# Patient Record
Sex: MALE | Race: ASIAN | NOT HISPANIC OR LATINO | URBAN - METROPOLITAN AREA
[De-identification: names, ages, dates, MRNs, and addresses within clinical notes are randomized per-mention and may not be internally consistent; named-entity substitution may affect disease eponyms.]

---

## 2021-01-31 ENCOUNTER — EMERGENCY (EMERGENCY)
Facility: HOSPITAL | Age: 21
LOS: 1 days | Discharge: HOME | End: 2021-01-31
Attending: EMERGENCY MEDICINE | Admitting: EMERGENCY MEDICINE
Payer: MEDICAID

## 2021-01-31 VITALS
DIASTOLIC BLOOD PRESSURE: 88 MMHG | TEMPERATURE: 98 F | HEART RATE: 114 BPM | OXYGEN SATURATION: 100 % | SYSTOLIC BLOOD PRESSURE: 138 MMHG | RESPIRATION RATE: 18 BRPM

## 2021-01-31 PROCEDURE — 99283 EMERGENCY DEPT VISIT LOW MDM: CPT

## 2021-01-31 PROCEDURE — 71046 X-RAY EXAM CHEST 2 VIEWS: CPT | Mod: 26

## 2021-01-31 RX ORDER — IBUPROFEN 200 MG
400 TABLET ORAL ONCE
Refills: 0 | Status: COMPLETED | OUTPATIENT
Start: 2021-01-31 | End: 2021-01-31

## 2021-01-31 RX ORDER — ACETAMINOPHEN 500 MG
650 TABLET ORAL ONCE
Refills: 0 | Status: COMPLETED | OUTPATIENT
Start: 2021-01-31 | End: 2021-01-31

## 2021-01-31 RX ADMIN — Medication 650 MILLIGRAM(S): at 13:59

## 2021-01-31 RX ADMIN — Medication 400 MILLIGRAM(S): at 13:59

## 2021-01-31 NOTE — ED PROVIDER NOTE - ATTENDING CONTRIBUTION TO CARE
I have seen and examined the patient on the patient´s visit date. I have reviewed the note written by Glen Hines MD, on that visit day. I have supervised and participated as necessary in the performance of procedures indicated for patient management and was available at all phases of the patient´s visit when needed. We discussed the history, physical exam findings, mnagement plan, and  medical decision making. I have made my additons, exceptions, and revisions within the chart and I agree with H and P as documented in its entirety. The data and my interpretation of any data collected from labs, interventions and imaging appear below as well as my independent medical decision making and considerations    The patient is a 20y Male who has a past medical and surgery history of  PTED with chest pain in the setting of heavy lifting as described  Vital Signs Last 24 Hrs  T(F): 98 HR: 114 BP: 138/88 RR: 18 SpO2: 100% (31 Jan 2021 12:45) (100% - 100%)  PE: as described; my additions and exceptions are noted in the chart  Plan  reassurance   analgesics  EKG normal with no risk factors active; PCP referral adequate  RTED PRN

## 2021-01-31 NOTE — ED PROVIDER NOTE - CLINICAL SUMMARY MEDICAL DECISION MAKING FREE TEXT BOX
Young Pt wo medical problems w/ cp. VSS. Exam unremarkable. Likely msk in the setting of new gym activity. Unlike acs, PE. EKG, xray, meds.

## 2021-01-31 NOTE — ED PROVIDER NOTE - OBJECTIVE STATEMENT
19yo M w/o medical problems comes to ED for cp. Chest pain x2days, L sided, worse with movement, sharp, constant. Not associated with SOB. Pt denies family hx of early cardiac issues. Also denies f/c, syncope, n/v, abdominal pain, or change in urine/bowel. Pt admits to working out again, 5 straight days last 2 days ago. Has not taken meds.

## 2021-01-31 NOTE — ED PROVIDER NOTE - NSFOLLOWUPINSTRUCTIONS_ED_ALL_ED_FT
[facility]  General Internal Medicine  [address] [place] [address]  Phone: [phone]  Fax:     [facility]  Internal Medicine  [address] [place] [address]  Phone: [phone]  Fax: [phone]    [facility]  Cardiology  [address] [place] [address]  Phone: [phone]    Chest Wall Pain  WHAT YOU NEED TO KNOW:  Chest wall pain may be caused by problems with the muscles, cartilage, or bones of the chest wall. Chest wall pain may also be caused by pain that spreads to your chest from another part of your body. The pain may be aching, severe, dull, or sharp. It may come and go, or it may be constant. The pain may be worse when you move in certain ways, breathe deeply, or cough.   DISCHARGE INSTRUCTIONS:  Call 911 if:   •You have any of the following signs of a heart attack: ?Squeezing, pressure, or pain in your chest  ?You may also have any of the following: ?Discomfort or pain in your back, neck, jaw, stomach, or arm  ?Shortness of breath  ?Nausea or vomiting  ?Lightheadedness or a sudden cold sweat  Return to the emergency department if:   •You have severe pain.  Contact your healthcare provider if:   •You develop a rash.   •You have other new symptoms.  •Your pain does not improve, even with treatment.  •You have questions or concerns about your condition or care.   Medicines: You may need any of the following:   •NSAIDs, such as ibuprofen, help decrease swelling, pain, and fever. This medicine is available with or without a doctor's order. NSAIDs can cause stomach bleeding or kidney problems in certain people. If you take blood thinner medicine, always ask your healthcare provider if NSAIDs are safe for you. Always read the medicine label and follow directions.  •Acetaminophen decreases pain. It is available without a doctor's order. Ask how much to take and how often to take it. Follow directions. Acetaminophen can cause liver damage if not taken correctly.  •A cream may be applied to your chest to decrease pain.   •Take your medicine as directed. Contact your healthcare provider if you think your medicine is not helping or if you have side effects. Tell him of her if you are allergic to any medicine. Keep a list of the medicines, vitamins, and herbs you take. Include the amounts, and when and why you take them. Bring the list or the pill bottles to follow-up visits. Carry your medicine list with you in case of an emergency.  Follow up with your healthcare provider as directed: Write down your questions so you remember to ask them during your visits.   Self-care:   •Rest as needed. Avoid activities that make your chest wall pain worse.  •Apply heat on your chest for 20 to 30 minutes every 2 hours for as many days as directed. Heat helps decrease pain and muscle spasms.  •Apply ice on your chest for 15 to 20 minutes every hour or as directed. Use an ice pack, or put crushed ice in a plastic bag. Cover it with a towel. Ice helps prevent tissue damage and decreases swelling and pain.  Dolor en la pared torácica  LO QUE NECESITA SABER:  El dolor de la pared torácica puede ser causado por problemas con los músculos, cartílago o huesos de la pared torácica. El dolor de la pared torácica también puede ser causado por dolor que se propaga a liu pecho y que proviene de otra parte de liu cuerpo. El dolor puede ser persistente, severo, leve o juana. Puede ser intermitente (va y viene) o puede ser persistente. El dolor también puede ser peor cuando usted se mueve de ciertas formas, respira profundo o tose  INSTRUCCIONES SOBRE EL BALJIT HOSPITALARIA:  Llame al 911 si:  •Tiene alguno de los siguientes signos de un ataque cardíaco: ?Estrujamiento, presión o tensión en liu pecho  ?Usted también podría presentar alguno de los siguientes: ?Malestar o dolor en liu espalda, merna, mandíbula, abdomen, o brazo  ?Falta de aliento  ?Náuseas o vómitos  ?Desvanecimiento o sudor frío repentino  Regrese a la bridgette de emergencias si:  •Usted tiene dolor intenso.  Comuníquese con liu médico si:  •Usted desarrolla un sarpullido.  •Usted tiene otros síntomas nuevos.  •Liu dolor no mejora, aún con tratamiento.  •Usted tiene preguntas o inquietudes acerca de liu condición o cuidado.  Medicamentos:Es posible que usted necesite alguno de los siguientes:   •Los SHELLY,robin el ibuprofeno, ayudan a disminuir la inflamación, el dolor y la fiebre. Gina medicamento está disponible con o sin marci receta médica. Los SHELLY pueden causar sangrado estomacal o problemas renales en ciertas personas. Si usted eligio un medicamento anticoagulante, siempre pregúntele a liu médico si los SHELLY son seguros para usted. Siempre hortencia la etiqueta de gina medicamento y siga las instrucciones.  •Acetaminofénalivia el dolor. Está disponible sin receta médica. Pregunte la cantidad y la frecuencia con que debe tomarlos. Siga las indicaciones. El acetaminofén puede causar daño en el hígado cuando no se eligio de forma correcta.  •Marci cremase puede aplicar en liu pecho para aliviar el dolor.  •[place] angel medicamentos robin se le haya indicado.Consulte con liu médico si usted nicki que liu medicamento no le está ayudando o si presenta efectos secundarios. Infórmele si es alérgico a algún medicamento. Mantenga marci lista actualizada de los medicamentos, las vitaminas y los productos herbales que eligio. Incluya los siguientes datos de los medicamentos: cantidad, frecuencia y motivo de administración. Traiga con usted la lista o los envases de las píldoras a angel citas de seguimiento. Lleve la lista de los medicamentos con usted en daniel de marci emergencia.  Acuda a angel consultas de control con liu médico según le indicaron.Anote angel preguntas para que se acuerde de hacerlas shade angel visitas.  Cuidados personales:  •El descansotanto robin sea necesario. Evite las actividades que le empeoren el dolor de la pared torácica.  •La aplicación de calora liu pecho de 20 a 30 minutos cada 2 horas por los días que le indiquen. El calor ayuda a disminuir el dolor y los espasmos musculares.  •Aplique hieloa liu pecho de 15 a 20 minutos cada hora según indicaciones. Use marci compresa de hielo o ponga hielo triturado en marci bolsa de plástico. Cúbrala con marci toalla. El hielo ayuda a evitar daño al tejido y a disminuir la inflamación y el dolor.

## 2021-01-31 NOTE — ED PROVIDER NOTE - PATIENT PORTAL LINK FT
You can access the FollowMyHealth Patient Portal offered by [facility] by registering at the following website: [email or web address]. By joining [employer]’s FollowMyHealth portal, you will also be able to view your health information using other applications (apps) compatible with our system.

## 2021-01-31 NOTE — ED PROVIDER NOTE - NS ED ROS FT
Constitutional: no fevers, chills  HEENT: no cough, rhinorrhea  Cardiac: no palpitations +cp  Respiratory: no SOB  GI: no n/v, abd pain, bloody or dark stools  : no dysuria, frequency, or hematuria  MSK: no joint pain  Skin: no rashes  Neuro: no headache, change in vision, weakness  Psych: negative

## 2021-01-31 NOTE — ED PROVIDER NOTE - PHYSICAL EXAMINATION
General: non-toxic, NAD  HEENT: NCAT, PERRL  Cardiac: RRR, no murmurs, 2+ peripheral pulses  Chest: CTA +mild ttp L side +mild pain with shoulder ROM  Abdomen: soft, non-distended, bowel sounds present, no ttp, no rebound or guarding  Extremities: no peripheral edema, -leg size discrepancies -calf tenderness  Skin: no rashes  Neuro: AAOx4, 5+motor, sensory grossly intact  Psych: mood and affect appropriate

## 2021-03-21 ENCOUNTER — INPATIENT (INPATIENT)
Facility: HOSPITAL | Age: 21
LOS: 17 days | Discharge: HOME | End: 2021-04-08
Attending: PSYCHIATRY & NEUROLOGY | Admitting: PSYCHIATRY & NEUROLOGY
Payer: MEDICAID

## 2021-03-21 VITALS
RESPIRATION RATE: 16 BRPM | DIASTOLIC BLOOD PRESSURE: 88 MMHG | TEMPERATURE: 99 F | SYSTOLIC BLOOD PRESSURE: 153 MMHG | OXYGEN SATURATION: 100 % | HEART RATE: 98 BPM

## 2021-03-21 DIAGNOSIS — F28 OTHER PSYCHOTIC DISORDER NOT DUE TO A SUBSTANCE OR KNOWN PHYSIOLOGICAL CONDITION: ICD-10-CM

## 2021-03-21 DIAGNOSIS — Z98.890 OTHER SPECIFIED POSTPROCEDURAL STATES: Chronic | ICD-10-CM

## 2021-03-21 LAB
ALBUMIN SERPL ELPH-MCNC: 5.2 G/DL — HIGH (ref 3.3–5)
ALP SERPL-CCNC: 89 U/L — SIGNIFICANT CHANGE UP (ref 40–120)
ALT FLD-CCNC: 23 U/L — SIGNIFICANT CHANGE UP (ref 4–41)
ANION GAP SERPL CALC-SCNC: 13 MMOL/L — SIGNIFICANT CHANGE UP (ref 7–14)
AST SERPL-CCNC: 18 U/L — SIGNIFICANT CHANGE UP (ref 4–40)
BASOPHILS # BLD AUTO: 0.03 K/UL — SIGNIFICANT CHANGE UP (ref 0–0.2)
BASOPHILS NFR BLD AUTO: 0.4 % — SIGNIFICANT CHANGE UP (ref 0–2)
BILIRUB SERPL-MCNC: 1 MG/DL — SIGNIFICANT CHANGE UP (ref 0.2–1.2)
BUN SERPL-MCNC: 12 MG/DL — SIGNIFICANT CHANGE UP (ref 7–23)
CALCIUM SERPL-MCNC: 10.2 MG/DL — SIGNIFICANT CHANGE UP (ref 8.4–10.5)
CHLORIDE SERPL-SCNC: 97 MMOL/L — LOW (ref 98–107)
CO2 SERPL-SCNC: 28 MMOL/L — SIGNIFICANT CHANGE UP (ref 22–31)
CREAT SERPL-MCNC: 0.99 MG/DL — SIGNIFICANT CHANGE UP (ref 0.5–1.3)
EOSINOPHIL # BLD AUTO: 0.02 K/UL — SIGNIFICANT CHANGE UP (ref 0–0.5)
EOSINOPHIL NFR BLD AUTO: 0.3 % — SIGNIFICANT CHANGE UP (ref 0–6)
GLUCOSE SERPL-MCNC: 110 MG/DL — HIGH (ref 70–99)
HCT VFR BLD CALC: 49.4 % — SIGNIFICANT CHANGE UP (ref 39–50)
HGB BLD-MCNC: 16.2 G/DL — SIGNIFICANT CHANGE UP (ref 13–17)
IANC: 4.81 K/UL — SIGNIFICANT CHANGE UP (ref 1.5–8.5)
IMM GRANULOCYTES NFR BLD AUTO: 0.3 % — SIGNIFICANT CHANGE UP (ref 0–1.5)
LYMPHOCYTES # BLD AUTO: 1.89 K/UL — SIGNIFICANT CHANGE UP (ref 1–3.3)
LYMPHOCYTES # BLD AUTO: 26.2 % — SIGNIFICANT CHANGE UP (ref 13–44)
MAGNESIUM SERPL-MCNC: 2 MG/DL — SIGNIFICANT CHANGE UP (ref 1.6–2.6)
MCHC RBC-ENTMCNC: 26.8 PG — LOW (ref 27–34)
MCHC RBC-ENTMCNC: 32.8 GM/DL — SIGNIFICANT CHANGE UP (ref 32–36)
MCV RBC AUTO: 81.7 FL — SIGNIFICANT CHANGE UP (ref 80–100)
MONOCYTES # BLD AUTO: 0.45 K/UL — SIGNIFICANT CHANGE UP (ref 0–0.9)
MONOCYTES NFR BLD AUTO: 6.2 % — SIGNIFICANT CHANGE UP (ref 2–14)
NEUTROPHILS # BLD AUTO: 4.81 K/UL — SIGNIFICANT CHANGE UP (ref 1.8–7.4)
NEUTROPHILS NFR BLD AUTO: 66.6 % — SIGNIFICANT CHANGE UP (ref 43–77)
NRBC # BLD: 0 /100 WBCS — SIGNIFICANT CHANGE UP
NRBC # FLD: 0 K/UL — SIGNIFICANT CHANGE UP
PLATELET # BLD AUTO: 226 K/UL — SIGNIFICANT CHANGE UP (ref 150–400)
POTASSIUM SERPL-MCNC: 3.9 MMOL/L — SIGNIFICANT CHANGE UP (ref 3.5–5.3)
POTASSIUM SERPL-SCNC: 3.9 MMOL/L — SIGNIFICANT CHANGE UP (ref 3.5–5.3)
PROT SERPL-MCNC: 8.2 G/DL — SIGNIFICANT CHANGE UP (ref 6–8.3)
RBC # BLD: 6.05 M/UL — HIGH (ref 4.2–5.8)
RBC # FLD: 13.1 % — SIGNIFICANT CHANGE UP (ref 10.3–14.5)
SARS-COV-2 RNA SPEC QL NAA+PROBE: SIGNIFICANT CHANGE UP
SODIUM SERPL-SCNC: 138 MMOL/L — SIGNIFICANT CHANGE UP (ref 135–145)
TOXICOLOGY SCREEN, DRUGS OF ABUSE, SERUM RESULT: SIGNIFICANT CHANGE UP
TSH SERPL-MCNC: 0.89 UIU/ML — SIGNIFICANT CHANGE UP (ref 0.27–4.2)
WBC # BLD: 7.22 K/UL — SIGNIFICANT CHANGE UP (ref 3.8–10.5)
WBC # FLD AUTO: 7.22 K/UL — SIGNIFICANT CHANGE UP (ref 3.8–10.5)

## 2021-03-21 PROCEDURE — 70460 CT HEAD/BRAIN W/DYE: CPT | Mod: 26

## 2021-03-21 PROCEDURE — 99285 EMERGENCY DEPT VISIT HI MDM: CPT | Mod: GC

## 2021-03-21 PROCEDURE — 70487 CT MAXILLOFACIAL W/DYE: CPT | Mod: 26

## 2021-03-21 RX ORDER — DIPHENHYDRAMINE HCL 50 MG
50 CAPSULE ORAL ONCE
Refills: 0 | Status: COMPLETED | OUTPATIENT
Start: 2021-03-21 | End: 2021-03-21

## 2021-03-21 RX ORDER — SODIUM CHLORIDE 9 MG/ML
1000 INJECTION INTRAMUSCULAR; INTRAVENOUS; SUBCUTANEOUS ONCE
Refills: 0 | Status: COMPLETED | OUTPATIENT
Start: 2021-03-21 | End: 2021-03-21

## 2021-03-21 RX ADMIN — SODIUM CHLORIDE 1000 MILLILITER(S): 9 INJECTION INTRAMUSCULAR; INTRAVENOUS; SUBCUTANEOUS at 16:27

## 2021-03-21 NOTE — ED BEHAVIORAL HEALTH ASSESSMENT NOTE - CASE SUMMARY
Patient was seen , evaluated, elements of hpi/ros/mse were discussed and agree with the above assessment and plan. The patient is a 19 yo male, domiciled, no prior psychiatric history, no previous psychiatric hospitalizations, no known suicide attempts, no significant medical history, recent history of punching walls in the context likely psychiatric decompensation, who presented to the ED for a few days of worsening confusion. Medical workup was unremarkable and psychiatry was consulted.  Patient presents disorganised in behavior and thought process, not able to fully explain why he is here. One minute he states his mood is ok, then he reports feeling sad. He is noted to touch his pants in bizzare fashion . Patient later required IM medications as he was trying to get into the bed with another pt . Pt at this time requires psychiatric admission for disorganization.

## 2021-03-21 NOTE — ED BEHAVIORAL HEALTH ASSESSMENT NOTE - DETAILS
patient did not endorse any suicidality but exam was limited by his disorganization attempted to call patient's parents twice no bed available, will update with patient has a bed [address]

## 2021-03-21 NOTE — ED BEHAVIORAL HEALTH ASSESSMENT NOTE - RISK ASSESSMENT
High Acute Suicide Risk Risk factors include his psychosis, age, his gender, no current psychiatric treatment, recent aggression towards property via punching a wall and limited insight into his need for treatment. Protective factors include strong social supports, housing stability, no known history of SA, no known current S/H/I/I/P

## 2021-03-21 NOTE — ED ADULT TRIAGE NOTE - CHIEF COMPLAINT QUOTE
Sent from MD to r/o meningitis. Pt. brought to ER by father for confusion starting 2-3 days ago. Pt. had nasal surgery on 3/3/21. Pt. has been disoriented and hitting things. Father noticed pt. to be sweaty and c/o headache yesterday. Unable to answer certain questions in triage but able to follow commands.

## 2021-03-21 NOTE — ED PROVIDER NOTE - ATTENDING CONTRIBUTION TO CARE
Attending Attestation: Dr. Montiel  I have personally performed a history and physical examination of the patient and discussed management with the resident as well as the patient.  I reviewed the resident's note and agree with the documented findings and plan of care.  I have authored and modified critical sections of the Provider Note, including but not limited to HPI, Physical Exam and MDM. 19yo M with recent nasal septum surgery presenting now for AMS with HA, confusion. No focal neuro deficits however patient is distractible and slow to respond to commands.  No nuchal rigidity or stiffness.  Pt reports ambiguous history of hearing voices. Denies fever/chills, cough, sick contacts, change in vision, or loss of ambulation. Low likelihood for meningitis given lack of fever and benign physical exam. Would consider possibly encephalitis, however, given lack of any vital sign abnormalities, exposures, and seasonality would consider mental changes changes possibly related to substance vs primary pscyh, but would also assess for structural dz via CTH., also to r/o infectioius complications of surgery.

## 2021-03-21 NOTE — ED ADULT NURSE REASSESSMENT NOTE - NS ED NURSE REASSESS COMMENT FT1
Bh pt coming from main ER area 23A pt is medical clear, sent for pschy eval. for increase confusion, pt cooperative, following instructions.    pending displ  Breanna Duenas RN

## 2021-03-21 NOTE — ED PROVIDER NOTE - PSYCHIATRIC, MLM
Alert and oriented to person, place, time/situation. normal mood and affect. no apparent risk to self or others. Unable to answer certain questions.

## 2021-03-21 NOTE — ED BEHAVIORAL HEALTH ASSESSMENT NOTE - DESCRIPTION
none known ICU Vital Signs Last 24 Hrs  T(C): 37 (21 Mar 2021 16:27), Max: 37.1 (21 Mar 2021 13:57)  T(F): 98.6 (21 Mar 2021 16:27), Max: 98.7 (21 Mar 2021 13:57)  HR: 81 (21 Mar 2021 16:27) (81 - 98)  BP: 172/99 (21 Mar 2021 16:27) (147/85 - 172/99)  BP(mean): --  ABP: --  ABP(mean): --  RR: 18 (21 Mar 2021 16:27) (16 - 18)  SpO2: 100% (21 Mar 2021 16:27) (100% - 100%)    Pt relatively calm, no IM PRNs needed patient lives at home with his mother and father

## 2021-03-21 NOTE — ED ADULT NURSE NOTE - OBJECTIVE STATEMENT
RN Facilitator: Pt received to room 23. Pt comes to ED c/o "altered mental status" as per pt's father X2-3 days. Pt had a nasal septoplasty on 3/3 and was told by his PMD to come to ED to r/o meningitis. Father reports pt has had poor sleep over the past week as well as having "outbursts" where pt has punched the wall. Pt is A&Ox3 at this time but admits he has confusion but cannot explain it. Denies SI/HI/hallucinations. Endorses marijuana use. States he has mild headaches and mild photophobia. 20 G IV placed to left AC. Respirations are even & unlabored, lung sounds clear, heart sounds normal, abdomen is soft, non-distended. Pt denies chest pain, dyspnea, N/V/D, chills, fever, weakness, dizziness, palpitations, cough. Pt's father at bedside. Report given to primary RN Amanda Laura.

## 2021-03-21 NOTE — ED BEHAVIORAL HEALTH ASSESSMENT NOTE - HPI (INCLUDE ILLNESS QUALITY, SEVERITY, DURATION, TIMING, CONTEXT, MODIFYING FACTORS, ASSOCIATED SIGNS AND SYMPTOMS)
On interview that patient perseverated on his parents and going home and had trouble staying on subject. He states that he's unsure of how he got to the hospital. He answered questions very concretely for example, in response to the question "what do you normally do at home?" He responded, "sometimes I use the bathroom (pause) sometimes I eat (pause) with my parents" In response to the questions about his appetite the patient responded, "I do eat, my parents get me food. They're the ones who feed me, right?" The patient states that he did feel confused and then said louder that he wanted to go home, quickly got up and walked purposefully towards me. He was able to be verbally redirected by security. The patient would sometimes answer questions softly, then appearing internally preoccupied he would say, "no" and give the opposite answer much louder and with more intensity. For example, in response to the question, "do you feel safe?", he first responded very softly, "I actually don't feel safe" then he suddenly said "No! Of course I feel safe! With my parents! I want to go home." Since the patient continued to have difficulty maintaining space between he and myself and would often unpredictable move towards me, I ended the interview early. The patient was unable to give a meaningful answer to questions about sleep. He did not endorse S/H/I/I/P.    Per staff patient appeared dysphoric and tearful prior to the interview. The patient is a 19 yo male, domiciled, no prior psychiatric history, no previous psychiatric hospitalizations, no known suicide attempts, no significant medical history, recent history of punching walls in the context likely psychiatric decompensation, who presented to the ED for a few days of worsening confusion. Medical workup was unremarkable and psychiatry was consulted.     Per ED note on 3/21, "recent nasal septoplasty on 3/3 brought to ED by father for two days of worsening confusion. Father reports pt has had poor sleep over past week as well as excessive talking as well as 2 outbursts in which he punched the wall: "I couldn't control myself". Pt reports confusion however is unable to describe the nature of his confusion, also endorses some depression since the nasal surgery w no SI/HI. When asked if he has been having any hallucinations, pt initially admitted to hearing voices over the past couple of weeks, non-command in nature however upon further questioning denied ever hearing any voices. "    On interview that patient perseverated on his parents and going home and had trouble staying on subject. He states that he's unsure of how he got to the hospital. He answered questions very concretely for example, in response to the question "what do you normally do at home?" He responded, "sometimes I use the bathroom (pause) sometimes I eat (pause) with my parents" In response to the questions about his appetite the patient responded, "I do eat, my parents get me food. They're the ones who feed me, right?" The patient states that he did feel confused and then said louder that he wanted to go home, quickly got up and walked purposefully towards me. He was able to be verbally redirected by security. The patient would sometimes answer questions softly, then appearing internally preoccupied he would say, "no" and give the opposite answer much louder and with more intensity. For example, in response to the question, "do you feel safe?", he first responded very softly, "I actually don't feel safe" then he suddenly said "No! Of course I feel safe! With my parents! I want to go home." Since the patient continued to have difficulty maintaining space between he and myself and would often unpredictable move towards me, I ended the interview early. The patient was unable to give a meaningful answer to questions about sleep. He did not endorse S/H/I/I/P.    Per staff patient appeared dysphoric and tearful prior to the interview.    Called patient's parents twice using number in the chart and left VM requesting call back but I have been unable to reach them.

## 2021-03-21 NOTE — ED BEHAVIORAL HEALTH ASSESSMENT NOTE - DIFFERENTIAL
Psychosis NOS vs Schizophrenia vs Schizoaffective disorder vs Psychosis NOS vs Schizophrenia vs Schizoaffective disorder vs Bipolar disorder with psychotic features

## 2021-03-21 NOTE — ED PROVIDER NOTE - OBJECTIVE STATEMENT
21yo M h/o recent nasal septoplasty on 3/3 brought to ED by father for two days of worsening confusion. Father reports pt has had poor sleep over past week as well as excessive talking. Pt reports confusion however is unable to describe the nature of his confusion, also endorses some depression since the nasal surgery w no SI/HI. When asked if he has been having any hallucinations, pt initially admitted to hearing voices over the past couple of weeks, non-command in nature however upon further questioning denied ever hearing any voices. Pt has history of occasional marijuana use and previous social etOH, denies any recent intoxication (no other drug usage). Pt reports mild diffuse headache w some photophobia no associated n/v or visual changes. No difficulty ambulating.   Pt denies SI/HI 21yo M h/o recent nasal septoplasty on 3/3 brought to ED by father for two days of worsening confusion. Father reports pt has had poor sleep over past week as well as excessive talking as well as 2 outbursts in which he punched the wall: "I couldn't control myself". Pt reports confusion however is unable to describe the nature of his confusion, also endorses some depression since the nasal surgery w no SI/HI. When asked if he has been having any hallucinations, pt initially admitted to hearing voices over the past couple of weeks, non-command in nature however upon further questioning denied ever hearing any voices. Pt has history of occasional marijuana use and previous social etOH, denies any recent intoxication (no other drug usage). Pt reports mild diffuse headache w some photophobia no associated n/v or visual changes. No difficulty ambulating.   Pt denies SI/HI 21yo M h/o recent nasal septoplasty on 3/3 brought to ED by father for two days of worsening confusion. Father reports pt has had poor sleep over past week as well as excessive talking as well as 2 outbursts in which he punched the wall: "I couldn't control myself". Pt reports confusion however is unable to describe the nature of his confusion, also endorses some depression since the nasal surgery w no SI/HI. When asked if he has been having any hallucinations, pt initially admitted to hearing voices over the past couple of weeks, non-command in nature however upon further questioning denied ever hearing any voices. Pt has history of occasional marijuana use and previous social etOH, denies any recent intoxication (no other drug usage). Pt reports mild diffuse headache w some photophobia no associated n/v or visual changes. No difficulty ambulating.  Pt denies SI/HI.  Per triage note pt sent in to r/o meningitis, however, no e/o infections at present.

## 2021-03-21 NOTE — ED PROVIDER NOTE - NEUROLOGICAL, MLM
Alert and oriented, no focal deficits, no motor or sensory deficits. Intact coordination however slow to follow commands Alert and oriented, no focal deficits, no motor or sensory deficits. Intact coordination however slow to follow commands. NO nuchal rigidity or stiffness.

## 2021-03-21 NOTE — ED BEHAVIORAL HEALTH ASSESSMENT NOTE - SUMMARY
On interview the patient is concrete, perseverative, appears internally preoccupied, and has disorganized behaviors (i.e. suddenly getting up and getting close to the members of the team).     The patient requires inpatient hospitalization for safety, symptom stabilization and medication management. The patient is a 21 yo male, domiciled, no prior psychiatric history, no previous psychiatric hospitalizations, no known suicide attempts, no significant medical history, recent history of punching walls in the context likely psychiatric decompensation, who presented to the ED for a few days of worsening confusion. Medical workup was unremarkable and psychiatry was consulted.   On interview the patient is concrete, perseverative, appears internally preoccupied, and has disorganized behaviors (i.e. suddenly getting up and getting close to the members of the team). Per collateral obtained from the medical team pt appears to have had some days of limited sleep and increased rate of speech. Patient told team members earlier that he was having auditory hallucinations for a few weeks. Pt was also labile in various discussions with staff. Likely presenting with first break psychotic episode vs bipolar disorder w/ psychotic features. The patient requires inpatient hospitalization for safety, symptom stabilization and medication management.

## 2021-03-21 NOTE — ED PROVIDER NOTE - CLINICAL SUMMARY MEDICAL DECISION MAKING FREE TEXT BOX
19yo M with recent nasal septum surgery presenting now for AMS with HA, confusion. No focal neurodeficits however patient is distractible and slow to respond to commands. Pt reports ambiguous history of hearing voices. Denies fever/chills, cough, sick contacts, change in vision, or loss of ambulation. Low likelihood for meningitis given lack of fever and benign physical exam. Low suspicion for cavernous sinous hematoma given EOMI, normal neural exam. Will r.o meningitis, obtain rectal temp. My suspicion is that patient may be presenting for first break of schizophrenia. 21yo M with recent nasal septum surgery presenting now for AMS with HA, confusion. No focal neuro deficits however patient is distractible and slow to respond to commands.  No nuchal rigidity or stiffness.  Pt reports ambiguous history of hearing voices. Denies fever/chills, cough, sick contacts, change in vision, or loss of ambulation. Low likelihood for meningitis given lack of fever and benign physical exam. Would consider possibly encephalitis, however, given lack of any vital sign abnormalities, exposures, and seasonality would consider mental changes changes possibly related to substance vs primary pscyh, but would also assess for structural dz via CTH., also to r/o infectioius complications of surgery.

## 2021-03-21 NOTE — ED PROVIDER NOTE - CARE PLAN
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

## 2021-03-21 NOTE — ED BEHAVIORAL HEALTH ASSESSMENT NOTE - PSYCHIATRIC ISSUES AND PLAN (INCLUDE STANDING AND PRN MEDICATION)
Defer standing medication/anti psychotic to primary team, patient may qualify for first episode study, PRNs: Ativan 2 mg PO/IM q6 PRN

## 2021-03-21 NOTE — ED PROVIDER NOTE - CAS EDP CONSULT WILL SEE PT
[facility]  *** FINAL REPORT ***    Name: Randolph Thomas  MRN: [id number]    Outpatient  : 27 May 1969  HIS Order #: [id number]  [id number] [facility] Visit #: [id number]  Date: 2018    TYPE OF TEST: Cerebrovascular Duplex    REASON FOR TEST  TIA    Right Carotid:-             Proximal               Mid                 Distal  cm/s  Systolic  Diastolic  Systolic  Diastolic  Systolic  Diastolic  CCA:     22.1                                      81.0  Bulb:  ECA:     78.0  ICA:     73.0      18.0                            76.0      29.0  ICA/CCA:  0.9    ICA Stenosis:    Right Vertebral:-  Finding: Antegrade  Sys:       39.0  Angela:       15.0    Right Subclavian:    Left Carotid:-            Proximal                Mid                 Distal  cm/s  Systolic  Diastolic  Systolic  Diastolic  Systolic  Diastolic  CCA:     39.6                                     111.0  Bulb:  ECA:     78.0  ICA:     63.0      20.0                            69.0      24.0  ICA/CCA:  0.6    ICA Stenosis:    Left Vertebral:-  Finding: Antegrade  Sys:       63.0  Angela:       23.0    Left Subclavian:    INTERPRETATION/FINDINGS  PROCEDURE:  Color duplex ultrasound imaging of extracranial  cerebrovascular arteries. FINDINGS:       Right:  Internal carotid velocity is within normal limits, there  is no observed narrowing of the flow channel on color Doppler imaging,   and no plaque is visualized on B-mode imaging. The common and  external carotid arteries are patent and without evidence of stenosis. Left:   Internal carotid velocity is within normal limits, there  is no observed narrowing of the flow channel on color Doppler imaging,   and no plaque is visualized on B-mode imaging. The common and  external carotid arteries are patent and without evidence of stenosis. IMPRESSION:  No evidence of carotid artery stenosis. Vertebrals are  patent with antegrade flow.     ADDITIONAL COMMENTS    I have personally reviewed the data relevant to the interpretation of  this  study.     TECHNOLOGIST: Josh Ansari RVT  Signed: 02/01/2018 12:54 PM    PHYSICIAN: Andrae Herrera MD  Signed: 02/02/2018 08:21 AM shortly

## 2021-03-22 DIAGNOSIS — F28 OTHER PSYCHOTIC DISORDER NOT DUE TO A SUBSTANCE OR KNOWN PHYSIOLOGICAL CONDITION: ICD-10-CM

## 2021-03-22 LAB
APPEARANCE UR: CLEAR — SIGNIFICANT CHANGE UP
BACTERIA # UR AUTO: NEGATIVE — SIGNIFICANT CHANGE UP
BILIRUB UR-MCNC: NEGATIVE — SIGNIFICANT CHANGE UP
COLOR SPEC: YELLOW — SIGNIFICANT CHANGE UP
DIFF PNL FLD: ABNORMAL
EPI CELLS # UR: 0 /HPF — SIGNIFICANT CHANGE UP (ref 0–5)
GLUCOSE UR QL: NEGATIVE — SIGNIFICANT CHANGE UP
HYALINE CASTS # UR AUTO: 0 /LPF — SIGNIFICANT CHANGE UP (ref 0–7)
KETONES UR-MCNC: ABNORMAL
LEUKOCYTE ESTERASE UR-ACNC: NEGATIVE — SIGNIFICANT CHANGE UP
NITRITE UR-MCNC: NEGATIVE — SIGNIFICANT CHANGE UP
PCP SPEC-MCNC: SIGNIFICANT CHANGE UP
PH UR: 6.5 — SIGNIFICANT CHANGE UP (ref 5–8)
PROT UR-MCNC: ABNORMAL
RBC CASTS # UR COMP ASSIST: 5 /HPF — HIGH (ref 0–4)
SP GR SPEC: 1.05 — HIGH (ref 1.01–1.02)
UROBILINOGEN FLD QL: SIGNIFICANT CHANGE UP
WBC UR QL: 1 /HPF — SIGNIFICANT CHANGE UP (ref 0–5)

## 2021-03-22 RX ORDER — ACETAMINOPHEN 500 MG
325 TABLET ORAL EVERY 6 HOURS
Refills: 0 | Status: DISCONTINUED | OUTPATIENT
Start: 2021-03-22 | End: 2021-04-08

## 2021-03-22 RX ORDER — DIPHENHYDRAMINE HCL 50 MG
50 CAPSULE ORAL ONCE
Refills: 0 | Status: DISCONTINUED | OUTPATIENT
Start: 2021-03-22 | End: 2021-04-08

## 2021-03-22 RX ORDER — LANOLIN ALCOHOL/MO/W.PET/CERES
3 CREAM (GRAM) TOPICAL AT BEDTIME
Refills: 0 | Status: DISCONTINUED | OUTPATIENT
Start: 2021-03-22 | End: 2021-04-08

## 2021-03-22 RX ORDER — DIPHENHYDRAMINE HCL 50 MG
50 CAPSULE ORAL EVERY 6 HOURS
Refills: 0 | Status: DISCONTINUED | OUTPATIENT
Start: 2021-03-22 | End: 2021-04-08

## 2021-03-22 RX ADMIN — Medication 3 MILLIGRAM(S): at 22:17

## 2021-03-22 RX ADMIN — Medication 50 MILLIGRAM(S): at 00:05

## 2021-03-22 RX ADMIN — Medication 2 MILLIGRAM(S): at 15:17

## 2021-03-22 RX ADMIN — SODIUM CHLORIDE 1000 MILLILITER(S): 9 INJECTION INTRAMUSCULAR; INTRAVENOUS; SUBCUTANEOUS at 00:00

## 2021-03-22 RX ADMIN — Medication 2 MILLIGRAM(S): at 00:05

## 2021-03-22 NOTE — BH PATIENT PROFILE - NSPROGENSOURCEINFO_GEN_A_NUR
[facility] calling for mutual patient to request script refill for rx:Victoza, thanks.     Current Outpatient Medications:   ••  Liraglutide (VICTOZA) 18 MG/3ML Subcutaneous Solution Pen-injector, INJECT 1.8 MG UNDER THE SKIN ONCE DAILY, Disp: 27 mL, Rfl: patient

## 2021-03-22 NOTE — ED ADULT NURSE REASSESSMENT NOTE - NS ED NURSE REASSESS COMMENT FT1
Pt ambulated to the bathroom, steady gait observed. Respirations equal and unlabored, no acute distress noted. Repeat vital signs as noted. Stretcher in lowest position, wheels locked, side rails up as per protocol. Will continue to monitor.

## 2021-03-22 NOTE — BH CHART NOTE - NSEVENTNOTEFT_PSY_ALL_CORE
Unit Arrival Note (sent from [facility] ED):     Chart reviewed. Handoff received from [facility] ED attending Dr. Belcher. Patient seen and examined briefly on arrival to .     In brief Sosa is a 21 yo M, domiciled with family, without significant PPHx, with PMH/PSHx of nasal setpoplasty 3/3/21, who was BIB parents to the ED for a few days of "confusion." Per ED records patient's father reported that patient was not sleeping over the past week, was talking excessively, became confused, and had 2 episodes of agitation/dysregulation where he punched the wall. On subsequent ED assessments patient presented as internally preoccupied, disorganized (attempting to elope from ED, crawling into another patients bed), and concrete. He reported depressed mood and "taking pills" as well as CAH to kill self as well as voices that talk to each other and laugh at time. He was not fully oriented. He received ativan 2 mg IM + benadryl 50 mg IM around midnight 3/21-3/22 for disorganized behavior as described above.     In ED given recent surgery and report of HA a/w photophobia medical w/u done--CBC, CMP largely wnl, U/A notable for trace ketones and inc spec gravity so IV fluids given. Utox negative. CT maxillofacial and brain unremarkable. Medically cleared and admitted to psychiatry.     On arrival to  patient appeared anxious and was perseverating around going home. He was AAO to place, year and season, but not month/date. He states he was BIB family to the ED, but isn't sure why. Reports AH that is "unclear" but sometimes tells him to hurt himself. Denies wanting or planning to follow commands, denies any intrinsic SI. Denies AH tell him to harm others and denies HI. Denies paranoid ideation and feels safe on unit. Denies PMHx, NKDA and denies taking any medications at home. Denies physical complaints.     Psychoeducation provided re: unit, treatment team and plan for full assessment tomorrow to create treatment plan with goal of working towards discharge. Pt expressed understanding.     MSE notable for odd relatedness, avoidant eye contact, perseverative to concrete TP, AH as described above, and decreased orientation. Does not appear acutely delirious.     A/P: Agree with assessment as per ED admission note--ddx first episode psychosis vs psychosis 2/2 GMC (less likely) vs affective psychosis. Will continue to observe, assess and obtain collateral as able.  -routine obs appropriate; denying current SI/HI  -ativan and benadryl PRNs; melatonin for sleep  -defer standing and PRN antipsychotic for now pending eval by first episode psychosis research study  -no acute medical issues at present

## 2021-03-22 NOTE — BH CONSULTATION LIAISON PROGRESS NOTE - NSBHASSESSMENTFT_PSY_ALL_CORE
20M with no psychiatric history presents with bizarre behavior. Concerning for first episode psychosis vs bethany. Disoriented, though no waxing/waning of attention that would be concerning for acute delirium. Hearing voices to hurt self, requires inpatient admission, 939.

## 2021-03-22 NOTE — ED ADULT NURSE REASSESSMENT NOTE - NS ED NURSE REASSESS COMMENT FT1
Pt increasingly disorganized; entering another patients room and wanting to get in their bed. Pt only momentarily re-directable before attempting to enter another pts room. Pt medicated as ordered.

## 2021-03-22 NOTE — BH PATIENT PROFILE - NSDASANEGATIVEREPORT_PSY_ALL_CORE_FT
RN- pt is discharge focused and looking at the door. Verbalizing he wants to go home. Appears fearful/ panicky. Wants family.

## 2021-03-22 NOTE — BH CONSULTATION LIAISON PROGRESS NOTE - NSBHCHARTREVIEWVS_PSY_A_CORE FT
Vital Signs Last 24 Hrs  T(C): 36.7 (22 Mar 2021 05:35), Max: 37.2 (21 Mar 2021 22:31)  T(F): 98 (22 Mar 2021 05:35), Max: 99 (21 Mar 2021 22:31)  HR: 96 (22 Mar 2021 05:35) (80 - 98)  BP: 129/96 (22 Mar 2021 05:35) (129/96 - 172/99)  BP(mean): --  RR: 18 (22 Mar 2021 05:35) (16 - 18)  SpO2: 100% (22 Mar 2021 05:35) (98% - 100%)

## 2021-03-22 NOTE — BH CONSULTATION LIAISON PROGRESS NOTE - NSBHFUPINTERVALHXFT_PSY_A_CORE
events of overnight shift noted. This AM staff reported pt attempted to elope when another patient was leaving and required redirection. Upon interview he is calm. He states he had depression for 2 months and took "pills" but cannot identify them. He states he is hearing voices that laugh at him and tell him to kill himself but also converse with each other. He is disoriented to year, "20", cannot say day or month. Knows his birthday. Cannot do 100-3 "98".

## 2021-03-22 NOTE — ED ADULT NURSE REASSESSMENT NOTE - NS ED NURSE REASSESS COMMENT FT1
Pt currently awake, pt ambulated to the bath room. Respirations equal and nonlabored, no acute distress noted. Repeat vital signs as noted.

## 2021-03-22 NOTE — ED BEHAVIORAL HEALTH NOTE - BEHAVIORAL HEALTH NOTE
Writer was informed pt in need of admission, [place] [facility]  Writer [place]ed referral to [place] for possible transfer.

## 2021-03-22 NOTE — BH PATIENT PROFILE - SELF-CONCEPT, DO YOU LIKE YOURSELF, PROFILE
Physical Therapy Treatment    Patient Name:  Cecilia Barahona   MRN:  [id number]    Recommendations:     Discharge Recommendations:  home health PT, home health OT   Discharge Equipment Recommendations: none   Barriers to discharge: Inaccessible home ([address])    Assessment:     Cecilia Barahona is a 67 y.o. female admitted with a medical diagnosis of S/P CABG x 3.  She presents with the following impairments/functional limitations:  weakness, impaired functional mobilty, impaired endurance, gait instability, impaired balance, impaired self care skills, impaired cardiopulmonary response to activity. Pt completing functional mobility without physical assist while maintaining sternal precautions. However, gait distance limited this date with impaired endurance and SOB on exertion noted. Pt would continue to benefit from skilled acute PT in order to address current deficits and progress functional mobility.     Rehab Prognosis: Good; patient would benefit from acute skilled PT services to address these deficits and reach maximum level of function.    Recent Surgery: Procedure(s) (LRB):  CORONARY ARTERY BYPASS GRAFT (CABG) x3  (N/A) 7 Days Post-Op    Plan:     During this hospitalization, patient to be seen 5 x/week to address the identified rehab impairments via gait training, therapeutic activities, therapeutic exercises, neuromuscular re-education and progress toward the following goals:    · Plan of Care Expires:  06/26/20    Subjective     Chief Complaint: fatigue  Patient/Family Comments/goals: Pt pleasant and agreeable throughout session. Eager to go home.   Pain/Comfort:  · Pain Rating 1: 0/10      Objective:     Communicated with RN prior to session.  Patient found up in chair with telemetry upon PT entry to room.     General Precautions: Standard, fall, sternal   Orthopedic Precautions:N/A   Braces: N/A     Functional Mobility:  · Transfers:     · Sit to Stand:  stand by assistance with no AD from bedside  chair  · Gait: ~120 ft. with CGA and pt using renee rail  · Mask donned prior to ambulation outside of room  · demo'd decreased gibson, decreased step length, impaired weight-shifting ability, and increased time in double stance  · Multiple intermittent standing rest breaks 2* impaired endurance. SOB on exertion noted. Cues for breathing technique and self-pacing provided.  · SpO2 98% following gait        AM-PAC 6 CLICK MOBILITY  Turning over in bed (including adjusting bedclothes, sheets and blankets)?: 3  Sitting down on and standing up from a chair with arms (e.g., wheelchair, bedside commode, etc.): 3  Moving from lying on back to sitting on the side of the bed?: 3  Moving to and from a bed to a chair (including a wheelchair)?: 3  Need to walk in hospital room?: 3  Climbing 3-5 steps with a railing?: 3  Basic Mobility Total Score: 18       Therapeutic Activities and Exercises:  Reviewed sternal precautions. Pt correctly stated 3/3 precautions. Able to maintain throughout session with min v/c.  Discussed d/c recs and barriers to d/c, including stairs to enter home. Pt educated on step-to technique for ascending/descending stairs. Pt also educated on maintaining sternal precautions with use of handrail. Pt reporting that she feels adequately prepared for d/c and denies further questions/concerns.   NP Brandy notified of pt functional level during session, potential barriers to d/c, and d/c recs for HH.     Patient left up in chair with all lines intact, call button in reach and NP notified..    GOALS:   Multidisciplinary Problems     Physical Therapy Goals        Problem: Physical Therapy Goal    Goal Priority Disciplines Outcome Goal Variances Interventions   Physical Therapy Goal     PT, PT/OT Ongoing, Progressing     Description:  Goals to be met by: 2020    Patient will increase functional independence with mobility by performin. Supine to sit with Contact Guard Assistance - not met  2. Sit to  stand transfer with Supervision - not met  3. Gait  x 150 feet with Supervision - not met  4. Ascend/descend 5 stair with bilateral Handrails Contact Guard Assistance - not met  5. Stand for 3 minutes with Supervision to increase activity tolerance - not met                      Time Tracking:     PT Received On: 06/01/20  PT Start Time: 0850     PT Stop Time: 0916  PT Total Time (min): 26 min     Billable Minutes: Therapeutic Activity 26    Treatment Type: Treatment  PT/PTA: PT     PTA Visit Number: 0     Idalmis Tubbs PT, DPT   6/1/2020  [phone]   yes

## 2021-03-23 PROCEDURE — 90853 GROUP PSYCHOTHERAPY: CPT

## 2021-03-23 PROCEDURE — 99222 1ST HOSP IP/OBS MODERATE 55: CPT

## 2021-03-23 RX ORDER — RISPERIDONE 4 MG/1
1 TABLET ORAL AT BEDTIME
Refills: 0 | Status: DISCONTINUED | OUTPATIENT
Start: 2021-03-23 | End: 2021-03-26

## 2021-03-23 RX ORDER — HYDROXYZINE HCL 10 MG
50 TABLET ORAL EVERY 6 HOURS
Refills: 0 | Status: DISCONTINUED | OUTPATIENT
Start: 2021-03-23 | End: 2021-04-08

## 2021-03-23 RX ADMIN — Medication 3 MILLIGRAM(S): at 21:39

## 2021-03-23 RX ADMIN — RISPERIDONE 1 MILLIGRAM(S): 4 TABLET ORAL at 21:39

## 2021-03-23 NOTE — BH INPATIENT PSYCHIATRY ASSESSMENT NOTE - HPI (INCLUDE ILLNESS QUALITY, SEVERITY, DURATION, TIMING, CONTEXT, MODIFYING FACTORS, ASSOCIATED SIGNS AND SYMPTOMS)
21 yo male, domiciled with family, without significant PPHx, no known suicide attempts, with PMH/PSHx of nasal septoplasty 3/3/21, recent history of not sleeping for one week, talking excessively, 2 episodes of agitation where he punched walls in the context likely psychiatric decompensation, who presented to the ED for a few days of worsening confusion.     Chart reviewed. Case discussed with interdisciplinary team. Patient seen and examined for follow up of psychosis. No acute events overnight. Per staff appears bizarre, oddly related, disorganized, stares off, visible, reports depressed at times; denies SI, AVH. Compliant with standing medication. Received PRN Ativan 2mg for agitation shortly after arriving on unit at 3pm, no PRNs overnight. Per sleep log, interrupted sleep of 4.5hrs total.    Patient was interviewed in a conference room and reports feels good. Pt preferred to speak in English instead of getting a  for SinFileStringese. He was only oriented to person. Pt thought he was in school, used the clock in room to answer month/date/day, however missed the year as this was not visible on clock, also did not know [place] president. He asked if the "green people can hear us." He endorsed AHs of voices singing the word "stable" yesterday. Denies any command AHs. Denies current AVH. The patient is unable to remember the events leading up to his admission. He said that he lives with his parents and younger sister and was studying computer science at [employer]. He is looking for work and is interested in anything, but wants to work for [employer] and design cars. He gave permission to reach out to his father. States he enjoys playing video games and continues to enjoy this. For the past week pt states he has been "sad, but is fine now." Ambivalent about whether he feels hopeless. He also endorsed racing thoughts but states he had trouble getting them out due to language difficulties. Denies current substance use, endorses h/o of alcohol and marijuana use but could not recall last use. Denies SI/HI.    Reports good appetite and PO intake currently and prior to admission. Encouraged adequate hydration in light of orthostatic VS, denies dizziness. Sleep is tenuous, improving. No physical complaints.   21 yo male, domiciled with family, without significant PPHx, no known suicide attempts, legal, violence or substance history, with PMH/PSHx of nasal septoplasty 3/3/21, who was BIB family for concern for confusion in the context of decreased sleep and increased talkativeness.    Patient was interviewed in a conference room, with interview limited by language barrier as well as likely psychotic symptoms. Chart reviewed. Case discussed with interdisciplinary team. No acute events overnight since arrival on 1S. Per staff pt appears bizarre, oddly related, disorganized, stares off, visible, reports feeling depressed at times; denies SI, AVH. Compliant with standing medication. Received PRN Ativan 2mg for agitation shortly after arriving on unit at 3pm, no PRNs overnight. Per sleep log, interrupted sleep of 4.5hrs total.    Pt preferred to speak in English instead of getting a  for Sinhalese-he was offered  services multiple times and refused. He was only oriented to person. Pt thought he was in school, used the clock in room to answer month/date/day, however missed the year as this was not visible on clock, also did not know [place] president.    The patient is unable to remember the events leading up to his admission.     He reported his mood as good. He asked if the "green people can hear us" (referring to unit MHWs). He endorsed AH of voices singing the word "stable" yesterday. Denies any command AH (despite report yesterday). Denies current AVH, despite appearing internally preoccupied. Denies paranoia or ideas of reference (although unclear if pt fully understood question about IoR). He states he has having trouble getting his thoughts out because of his difficulties with English. For the past week pt states he has been "sad, but am fine now." Is inconsistent in his response to questions about hopelessness. Reports that he was having difficulty sleeping for a few days prior to admission due to racing thoughts, but has difficulty elaborating on this. Denies any appetite changes.  He also endorsed racing thoughts but states he had trouble getting them out due to language difficulties. Denies current substance use, endorses h/o of alcohol and marijuana use but could not recall frequency of use or date of last use. Denies SI/HI.    He says that he lives with his parents and younger sister and was studying computer science at [employer]. He is looking for work and is interested in anything, but wants to work for [employer] and design cars. States he enjoys playing video games and continues to enjoy this.    Reports good PO intake currently. Encouraged adequate hydration in light of orthostatic VS, denies dizziness. No physical complaints today. Provides verbal consent to speak with family (please see collateral as documented in separate Chart note).

## 2021-03-23 NOTE — BH INPATIENT PSYCHIATRY ASSESSMENT NOTE - CURRENT MEDICATION
MEDICATIONS  (STANDING):  melatonin. 3 milliGRAM(s) Oral at bedtime  risperiDONE   Tablet 1 milliGRAM(s) Oral at bedtime    MEDICATIONS  (PRN):  acetaminophen   Tablet .. 325 milliGRAM(s) Oral every 6 hours PRN Mild Pain (1 - 3), Moderate Pain (4 - 6), Severe Pain (7 - 10)  diphenhydrAMINE 50 milliGRAM(s) Oral every 6 hours PRN agitation  diphenhydrAMINE   Injectable 50 milliGRAM(s) IntraMuscular once PRN agitation  hydrOXYzine hydrochloride 50 milliGRAM(s) Oral every 6 hours PRN anxiety  LORazepam     Tablet 2 milliGRAM(s) Oral every 6 hours PRN agitation 2/2 psychiatric illness  LORazepam   Injectable 2 milliGRAM(s) IntraMuscular once PRN Agitation

## 2021-03-23 NOTE — BH INPATIENT PSYCHIATRY ASSESSMENT NOTE - NSBHCHARTREVIEWVS_PSY_A_CORE FT
Vital Signs Last 24 Hrs  T(C): 37.1 (23 Mar 2021 06:28), Max: 37.1 (23 Mar 2021 06:28)  T(F): 98.8 (23 Mar 2021 06:28), Max: 98.8 (23 Mar 2021 06:28)  HR: --  BP: --  BP(mean): --  RR: --  SpO2: --

## 2021-03-23 NOTE — BH INPATIENT PSYCHIATRY ASSESSMENT NOTE - MSE UNSTRUCTURED FT
The patient appears stated age, disheveled, and dressed in hospital gown with chest exposed and no shoes.  The patient is tense at times, cooperative with the interview, although distracted and requires redirection, maintained avoidant eye contact with odd relatedness.  No psychomotor agitation or retardation noted, no abnormal movements.  Steady gait observed.  The patient's speech is less spontaneous, with increased latency, normal in tone and volume.  The patient's mood is "good."  Affect is labile, inappropriate.  Thought process is concrete with interview questions, disorganized, illogical. Thought content notable for limited history and insight of Sx, and wanting to see family. Denies current delusional content.  Denies any suicidal or homicidal ideation, intent, or plan. Denies current AH/VH.  Cognition grossly intact, AAOx1 to self only. Insight is poor.  Judgment is poor. Impulse control has been fair on the unit. The patient appears stated age, disheveled, and dressed in hospital gown with chest exposed and no shoes.  The patient is tense at times, attempts to be cooperative with the interview, although distracted and requires redirection, maintained avoidant eye contact with odd relatedness.  No psychomotor agitation or retardation noted, no abnormal movements.  Steady gait observed.  The patient's speech is less spontaneous, with increased latency, normal in tone and volume.  The patient's mood is "good."  Affect is labile, inappropriate.  Thought process is concrete with interview questions, disorganized, illogical. Thought content notable for limited history and insight of Sx, and wanting to see family. Denies current delusional content.  Denies any suicidal or homicidal ideation, intent, or plan. Denies current AH/VH.  Cognition grossly intact, AAOx1 to self only. Insight is poor.  Judgment is poor. Impulse control has been fair on the unit.

## 2021-03-23 NOTE — BH INPATIENT PSYCHIATRY PROGRESS NOTE - NSBHASSESSSUMMFT_PSY_ALL_CORE
19 yo male, domiciled with family, without significant PPHx, no known suicide attempts, with PMH/PSHx of nasal septoplasty 3/3/21, recent history of not sleeping for one week, talking excessively, 2 episodes of agitation where he punched walls in the context likely psychiatric decompensation, who presented to the ED for a few days of worsening confusion.     Working diagnosis is first episode psychosis, in light of active psychotic Sx, including +AH, odd relatedness, avoidant eye contact, concrete, illogical, disorganized TP, unpredictable with labile mood, recent insomnia. Will offer  services continue to assess for formal thought disorder from barriers of limited English proficiency, and will obtain collateral from family. At this time, less evidence for psychosis 2/2 substance use or general medical condition vs affective psychosis.     Currently,     Continued inpatient admission required for safety and symptom stabilization.      Plan:  1. Legal: Admitted on 9.39 emergency involuntary  2. Safety: No reported SI/SIB/HI/VI currently on unit; continue routine observation  3. Psychiatric:   -start Risperdal 1mg qhs for psychosis  -continue melatonin for sleep  -PRN anxiety: Atarax 50mg q6hr  -PRN agitation: Ativan 2mg/ Benadryl 50mg q6 PO/IM  4. Therapy: Group & milieu therapy as tolerated  5. Medical: No acute medical needs identified. No CPAP  6. Collateral: pending from father  7. Disposition: when stable   19 yo male, domiciled with family, without significant PPHx, no known suicide attempts, with PMH/PSHx of nasal septoplasty 3/3/21, recent history of not sleeping for one week, talking excessively, 2 episodes of agitation where he punched walls in the context likely psychiatric decompensation, who presented to the ED for a few days of worsening confusion.     Working diagnosis is first episode psychosis, in light of active psychotic Sx, including +AH, odd relatedness, avoidant eye contact, concrete, illogical, disorganized TP, unpredictable with labile mood, recent insomnia. Will offer  services continue to assess for formal thought disorder from barriers of limited English proficiency, and will obtain collateral from family. At this time, less evidence for psychosis 2/2 substance use or general medical condition vs affective psychosis.     Currently, patient is psychotic with auditory hallucinations of people singing and endorses possible racing thoughts. Patient is only oriented to self and does not recall recent events. Continued inpatient admission required for safety and symptom stabilization.    Plan:  1. Legal: Admitted on 9.39 emergency involuntary  2. Safety: No reported SI/SIB/HI/VI currently on unit; continue routine observation  3. Psychiatric:   -start Risperdal 1mg qhs for psychosis  -continue melatonin for sleep  -PRN anxiety: Atarax 50mg q6hr  -PRN agitation: Ativan 2mg/ Benadryl 50mg q6 PO/IM  4. Therapy: Group & milieu therapy as tolerated  5. Medical: No acute medical needs identified. No CPAP  6. Collateral: pending from father  7. Disposition: when stable   19 yo male, domiciled with family, without significant PPHx, no known suicide attempts, with PMH/PSHx of nasal septoplasty 3/3/21, recent history of not sleeping for one week, talking excessively, 2 episodes of agitation where he punched walls in the context likely psychiatric decompensation, who presented to the ED for a few days of worsening confusion.     Working diagnosis is first episode psychosis, in light of active psychotic Sx, including +AH, odd relatedness, avoidant eye contact, concrete, illogical, disorganized TP, unpredictable with labile mood, recent insomnia. Will offer  services and continue to assess for formal thought disorder vs barriers of limited English proficienc. Will also obtain collateral from family. At this time, less evidence for psychosis 2/2 substance use or general medical condition vs affective psychosis.     Currently, patient presents as psychotic with unpredictable with labile mood, concrete disorganized and illogical TP, appears distracted on interview. Last endorsed AH yesterday, of people singing. May also present with possible racing thoughts. Denies current AVH. Denies SI/HI. Insight and judgement are limited. Sleep remains tenuous. Patient is only oriented to self and does not recall events leading to hospitalization. Continued inpatient admission required for safety and symptom stabilization.    Plan:  1. Legal: Admitted on 9.39 emergency involuntary  2. Safety: No reported SI/SIB/HI/VI currently on unit; continue routine observation  3. Psychiatric:   -start Risperdal 1mg qhs for psychosis  -continue melatonin for sleep  -PRN anxiety: Atarax 50mg q6hr  -PRN agitation: Ativan 2mg/ Benadryl 50mg q6 PO/IM  4. Therapy: Group & milieu therapy as tolerated  5. Medical: No acute medical needs identified. No CPAP  6. Collateral: pending from father  7. Disposition: when stable

## 2021-03-23 NOTE — BH INPATIENT PSYCHIATRY ASSESSMENT NOTE - NSTXPSYCHODATEEST_PSY_ALL_CORE
-Reports possible seizure while at SNF   patient was recently transferred to [address] [place] [address] for neurology evaluation, had multiple medications adjusted, continue with same meds, previous discharge from [facility] was reviewed in detail, with close attention to medications and dosing  23-Mar-2021

## 2021-03-23 NOTE — BH INPATIENT PSYCHIATRY ASSESSMENT NOTE - OTHER PAST PSYCHIATRIC HISTORY (INCLUDE DETAILS REGARDING ONSET, COURSE OF ILLNESS, INPATIENT/OUTPATIENT TREATMENT)
none No prior dx, hospitalizations, outpatient tx or medication trials. No known SI (although pt made bizarre statements to family about death and suicide) or SA. No known history of violence outside of dysregulation and punching property prior to admission.

## 2021-03-23 NOTE — BH INPATIENT PSYCHIATRY PROGRESS NOTE - MSE UNSTRUCTURED FT
The patient appears stated age, disheveled, and dressed in hospital gown with chest exposed and no shoes.  The patient is tense at times, cooperative with the interview, although distracted and requires redirection, maintained avoidant eye contact with odd relatedness.  No psychomotor agitation or retardation noted, no abnormal movements.  Steady gait observed.  The patient's speech is less spontaneous, with increased latency, normal in tone and volume.  The patient's mood is "good."  Affect is labile, inappropriate.  Thought process is concrete with interview questions, disorganized, illogical. Thought content notable for limited history and insight of Sx, and wanting to see family. Denies delusional content.  Denies any suicidal or homicidal ideation, intent, or plan. Denies current AH/VH.  Cognition grossly intact, AAOx1 to self only. Insight is poor.  Judgment is poor.  Impulse control has been fair on the unit. The patient appears stated age, disheveled, and dressed in hospital gown with chest exposed and no shoes.  The patient is tense at times, cooperative with the interview, although distracted and requires redirection, maintained avoidant eye contact with odd relatedness.  No psychomotor agitation or retardation noted, no abnormal movements.  Steady gait observed.  The patient's speech is less spontaneous, with increased latency, normal in tone and volume.  The patient's mood is "good."  Affect is labile, inappropriate.  Thought process is concrete with interview questions, disorganized, illogical. Thought content notable for limited history and insight of Sx, and wanting to see family. Denies delusional content.  Denies any suicidal or homicidal ideation, intent, or plan. Denies current AH/VH.  Cognition grossly intact, AAOx1 to self only. Insight is poor.  Judgment is poor. Impulse control has been fair on the unit. The patient appears stated age, disheveled, and dressed in hospital gown with chest exposed and no shoes.  The patient is tense at times, cooperative with the interview, although distracted and requires redirection, maintained avoidant eye contact with odd relatedness.  No psychomotor agitation or retardation noted, no abnormal movements.  Steady gait observed.  The patient's speech is less spontaneous, with increased latency, normal in tone and volume.  The patient's mood is "good."  Affect is labile, inappropriate.  Thought process is concrete with interview questions, disorganized, illogical. Thought content notable for limited history and insight of Sx, and wanting to see family. Denies current delusional content.  Denies any suicidal or homicidal ideation, intent, or plan. Denies current AH/VH.  Cognition grossly intact, AAOx1 to self only. Insight is poor.  Judgment is poor. Impulse control has been fair on the unit.

## 2021-03-23 NOTE — BH INPATIENT PSYCHIATRY ASSESSMENT NOTE - CASE SUMMARY
Sosa is a 21 yo M with no PPHx who presents with new onset of behavioral change including decreased sleep, increased verbal expression/talkativeness, AH, behavioral dysregulation with agitation. On MSE pt presents as odd, with disorganized TP and behavior, possible thought blocking (vs significant language barrier), and limited orientation. Dx impression is psychosis with ddx brief psychotic d/o vs affective psychosis vs psychosis 2/2 GMC; substance induced psychosis much less likely given negative utox and no significant substance use reported. Continued collateral, observation and interview with  needed to refine ddx. Will start risperdal 1 mg HS for psychosis tonight.

## 2021-03-23 NOTE — PSYCHIATRIC REHAB INITIAL EVALUATION - NSBHPRRECOMMEND_PSY_ALL_CORE
Writer met with patient to orient to unit and introduce self, psychiatric rehabilitation staff and department functions. Patient was provided a copy of the unit schedule. In response to the COVID-19 outbreak, there has been a shift in hospital wide policies and protocols. As a result, it should be noted that unit programming will be re-evaluated on a consistent basis in effort to maintain safety guidelines. Writer encouraged patient to attend psychiatric rehabilitation groups and engage in treatment. Writer and patient were able to establish a collaborative psychiatric goal. Psychiatric Rehabilitation staff will continue to engage patient daily in order to develop therapeutic rapport.

## 2021-03-23 NOTE — BH INPATIENT PSYCHIATRY ASSESSMENT NOTE - NSBHASSESSSUMMFT_PSY_ALL_CORE
21 yo male, domiciled with family, without significant PPHx, no known suicide attempts, with PMH/PSHx of nasal septoplasty 3/3/21, recent history of not sleeping for one week, talking excessively, 2 episodes of agitation where he punched walls in the context likely psychiatric decompensation, who presented to the ED for a few days of worsening confusion.     Working diagnosis is first episode psychosis, in light of active psychotic Sx, including +AH, odd relatedness, avoidant eye contact, concrete, illogical, disorganized TP, unpredictable with labile mood, recent insomnia. Will offer  services and continue to assess for formal thought disorder vs barriers of limited English proficienc. Will also obtain collateral from family. At this time, less evidence for psychosis 2/2 substance use or general medical condition vs affective psychosis.     Currently, patient presents as psychotic with unpredictable with labile mood, concrete disorganized and illogical TP, appears distracted on interview. Last endorsed AH yesterday, of people singing. May also present with possible racing thoughts. Denies current AVH. Denies SI/HI. Insight and judgement are limited. Sleep remains tenuous. Patient is only oriented to self and does not recall events leading to hospitalization. Continued inpatient admission required for safety and symptom stabilization.    Plan:  1. Legal: Admitted on 9.39 emergency involuntary  2. Safety: No reported SI/SIB/HI/VI currently on unit; continue routine observation  3. Psychiatric:   -start Risperdal 1mg qhs for psychosis  -continue melatonin for sleep  -PRN anxiety: Atarax 50mg q6hr  -PRN agitation: Ativan 2mg/ Benadryl 50mg q6 PO/IM  4. Therapy: Group & milieu therapy as tolerated  5. Medical: No acute medical needs identified. No CPAP  6. Collateral: obtained from father 3/23 (chart note)  7. Disposition: when stable   21 yo male, domiciled with family, without significant PPHx, no known suicide attempts, with PMH/PSHx of nasal septoplasty 3/3/21, recent history of not sleeping for one week, talking excessively, 2 episodes of agitation where he punched walls in the context likely psychiatric decompensation, who presented to the ED for a few days of worsening confusion.     Working diagnosis is first episode psychosis, in light of active psychotic Sx, including +AH, odd relatedness, avoidant eye contact, concrete, illogical, disorganized TP, unpredictable with labile mood, recent insomnia. Will offer  services and continue to assess for formal thought disorder vs barriers of limited English proficienc. Will also obtain collateral from family. At this time, less evidence for psychosis 2/2 substance use or general medical condition vs affective psychosis.     Currently, patient presents as psychotic with unpredictable with labile mood, concrete disorganized and illogical TP, appears distracted on interview. Last endorsed AH yesterday, of people singing. May also present with possible racing thoughts. Denies current AVH. Denies SI/HI. Insight and judgement are limited. Sleep remains tenuous. Patient is only oriented to self and does not recall events leading to hospitalization. Continued inpatient admission required for safety and symptom stabilization.    Plan:  1. Legal: Admitted on 9.39 emergency involuntary  2. Safety: No reported SI/SIB/HI/VI currently on unit; continue routine observation  3. Psychiatric:   -start Risperdal 1mg qhs for psychosis  -continue melatonin for sleep  -PRN anxiety: Atarax 50mg q6hr  -PRN agitation: Ativan 2mg/ Benadryl 50mg q6 PO/IM  4. Therapy: Group & milieu therapy as tolerated  5. Medical: No acute medical needs identified. No CPAP  -f/u lipids, A1C, and EKG  6. Collateral: obtained from father 3/23 (chart note)  7. Disposition: when stable   21 yo male, domiciled with family, without significant PPHx, no known suicide attempts, legal, violence or substance history, with PMH/PSHx of nasal septoplasty 3/3/21, who was BIB family for concern for confusion in the context of decreased sleep and increased talkativeness.     Working diagnosis is unspecified psychosis, in light of active psychotic Sx, including +AH, odd relatedness, concrete, illogical, disorganized TP, intense labile mood, recent insomnia. Will offer  services and continue to assess for formal thought disorder vs barriers of limited English proficiency. Will also obtain collateral from family. At this time, less evidence for psychosis 2/2 substance use or general medical condition vs affective psychosis-but remain on differential.     Currently, patient presents as psychotic with unpredictable with labile mood, concrete disorganized and illogical TP, appears distracted on interview. Last endorsed AH yesterday, of people singing. May also present with possible racing thoughts. Denies current AVH. Denies SI/HI. Insight and judgement are limited. Sleep remains tenuous. Patient is only oriented to self and does not recall events leading to hospitalization. Continued inpatient admission required for safety and symptom stabilization.    Plan:  1. Legal: Admitted on 9.39 emergency involuntary status  2. Safety: No reported SI/SIB/HI/VI currently on unit; continue routine observation  3. Psychiatric:   -start Risperdal 1mg qhs for psychosis  -continue melatonin for sleep  -PRN anxiety: Atarax 50mg q6hr  -PRN agitation: Ativan 2mg/ Benadryl 50mg q6 PO/IM  4. Therapy: Group & milieu therapy as tolerated  5. Medical: No acute medical needs identified. No CPAP  -f/u lipids, A1C, and EKG  6. Collateral: obtained from father 3/23 (chart note)  7. Disposition: when stable

## 2021-03-23 NOTE — BH INPATIENT PSYCHIATRY ASSESSMENT NOTE - DESCRIPTION
Pt lives at home with his mother, father, and younger sister. 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 Currently unemployed, looking for work. Denies current substance use, endorses h/o of alcohol and marijuana use but could not recall last use.

## 2021-03-23 NOTE — BH INPATIENT PSYCHIATRY PROGRESS NOTE - NSBHFUPINTERVALHXFT_PSY_A_CORE
Chart reviewed. Case discussed with interdisciplinary team. Patient seen and examined for follow up of psychosis. No acute events overnight. Per staff appears bizarre, oddly related, disorganized, stares off, visible, reports depressed at times; denies SI, AVH. Compliant with standing medication. Received PRN Ativan 2mg for agitation shortly after arriving on unit at 3pm, no PRNs overnight. Per sleep log, interrupted sleep of 4.5hrs total.    **    Reports good appetite and PO intake. Encouraged adequate hydration in light of orthostatic VS, denies dizziness. Sleep is tenuous, improving. No physical complaints.  Chart reviewed. Case discussed with interdisciplinary team. Patient seen and examined for follow up of psychosis. No acute events overnight. Per staff appears bizarre, oddly related, disorganized, stares off, visible, reports depressed at times; denies SI, AVH. Compliant with standing medication. Received PRN Ativan 2mg for agitation shortly after arriving on unit at 3pm, no PRNs overnight. Per sleep log, interrupted sleep of 4.5hrs total.    Patient was interviewed in a private room and reported today that he was feeling good. Pt stated he preferred to speak using English instead of getting a . He was only oriented to person. He thought he was in a school and required the calendar on the clock to know the date. He asked if the "green people can hear us." He endorsed AHs of voices singing the word "stable" yesterday. Denied any command AHs. The patient is unable to remember the events leading up to his admission. He said that he lives with his parents and younger sister and was studying computer science at [employer]. He is looking for work and is interested in anything, but wants to work for [employer] and design cars. He gave permission to reach out to his family. He said that he enjoys playing video games for fun. For the past week pt stated he has been "sad." He also endorsed racing thoughts but stated he had trouble getting them out due to language difficulties. He denied any substance use, SI/HI, VHs.    Reports good appetite and PO intake. Encouraged adequate hydration in light of orthostatic VS, denies dizziness. Sleep is tenuous, improving. No physical complaints.  Chart reviewed. Case discussed with interdisciplinary team. Patient seen and examined for follow up of psychosis. No acute events overnight. Per staff appears bizarre, oddly related, disorganized, stares off, visible, reports depressed at times; denies SI, AVH. Compliant with standing medication. Received PRN Ativan 2mg for agitation shortly after arriving on unit at 3pm, no PRNs overnight. Per sleep log, interrupted sleep of 4.5hrs total.    Patient was interviewed in a conference room and reports feels good. Pt preferred to speak in English instead of getting a  for [employer]. He was only oriented to person. Pt thought he was in school, used the clock in room to answer month/date/day, however missed the year as this was not visible on clock, also did not know [place] president. He asked if the "green people can hear us." He endorsed AHs of voices singing the word "stable" yesterday. Denies any command AHs. Denies current AVH. The patient is unable to remember the events leading up to his admission. He said that he lives with his parents and younger sister and was studying computer science at [employer]. He is looking for work and is interested in anything, but wants to work for [employer] and design cars. He gave permission to reach out to his father. States he enjoys playing video games and continues to enjoy this. For the past week pt states he has been "sad, but is fine now." Ambivalent about whether he feels hopeless. He also endorsed racing thoughts but states he had trouble getting them out due to language difficulties. Denies current substance use, endorses h/o of alcohol and marijuana use but could not recall last use. Denies SI/HI.    Reports good appetite and PO intake currently and prior to admission. Encouraged adequate hydration in light of orthostatic VS, denies dizziness. Sleep is tenuous, improving. No physical complaints.

## 2021-03-23 NOTE — BH INPATIENT PSYCHIATRY ASSESSMENT NOTE - RISK ASSESSMENT
Risk factors include his psychosis, age, his gender, no current psychiatric treatment, recent aggression towards property via punching a wall and limited insight into his need for treatment. Protective factors include strong social supports, housing stability, no known history of SA, no known current S/H/I/I/P Risk factors include his psychosis, age, his gender, no current psychiatric treatment, recent aggression towards property via punching a wall, level of disorganization of TP and behavior, and limited insight into his need for treatment. Protective factors include strong social supports, housing stability, no known history of SA, no known history of aggression towards others/HI, no known current S/H/I/I/P.

## 2021-03-23 NOTE — BH INPATIENT PSYCHIATRY ASSESSMENT NOTE - NSBHMETABOLIC_PSY_ALL_CORE_FT
BMI:   HbA1c:   Glucose: POCT Blood Glucose.: 105 mg/dL (03-21-21 @ 14:05)    BP: 129/96 (03-22-21 @ 05:35) (129/96 - 172/99)  Lipid Panel:

## 2021-03-23 NOTE — BH INPATIENT PSYCHIATRY ASSESSMENT NOTE - NSHPLANGTRANSLATORFT_GEN_A_CORE
No phone  for [place] was available. When team called family, Pt's father preferred to have Pt's sister as .

## 2021-03-23 NOTE — BH INPATIENT PSYCHIATRY ASSESSMENT NOTE - COLLATERAL SOURCE
Clinically and biochemically euthyroid  Goal is a normal TSH  Avoid exogenous hyperthyroidism as this can accelerate bone loss and increase risk of CV complications.    Recheck in 2 months   Personal collateral

## 2021-03-23 NOTE — BH INPATIENT PSYCHIATRY ASSESSMENT NOTE - NSBHCHARTREVIEWLAB_PSY_A_CORE FT
3/21: unremarkable CBC Diff, CMP, Mg, Phos, TSH, neg serum tox, neg Blood Cx x2  3/22: neg UTox, unremarkable UA

## 2021-03-23 NOTE — BH CHART NOTE - NSEVENTNOTEFT_PSY_ALL_CORE
Collateral from Father Nataly [phone]  No available Sinhalese ([facility])  after ~20mins. Father preferred to use family as , Pt's Sister (Marsha), Pt verbally consented to speak with all family.    HPI: after 2 weeks from surgery and for 2-3 days, Pt became confused, did not sleep, seen crying and laughing. Pt was watching cartoons on TV, got scared, then punched the walls 2 times the day Pt brought into ED. Angry, then calmed down. Pt is more expressive, saying odd things like, says he never tried something like overdosing. I love my dad, come home early. Tells family that he loves hugging them. Poor recall, can't remember events from last few days.     Sx Screening:  --Depression: Pt says he feels sad a week ago, useless after not getting 's license. Poor energy, seems tired from not sleeping, poor concentration. Normal eating. Endorses active SI, denies intent or plan. ?SIB/SA on 3/21, Pt held Tylenol pills in his hands, ambivalent intent, then gave pills to mother. Father thinks he had headache  --Nel: (+) not sleeping for 4 days, bought T-shirt for sister (out of character), seems to talk a little faster, but not much  (-) poor energy, not irritable, not grandiose, no increase in goal directed  --Psychosis: not aware of paranoia, delusions, AH, VH -- has dreams and sees things    PMH/PSH: nasal septoplasty 3/3/21, otherwise no chronic PMH ---follows PCP Dr. Jerry Wade [phone]; last saw Pt in Feb 2021.  Meds: none  Allerg: none  SH: Lived and graduated high school in [place], arrived in [place] in 2017. Unemployed 3 months ago, worked at [employer]. Not currently enrolled in classes at [employer], stopped in March 2020 bc of pandemic. Per fam, Pt likes to play video games, maybe excessively  --Dad found empty beer bottles, unaware of details, withdrawal, rehab, or other issues with alcohol  --knows of marijuana use hx, unaware of details  FH: denies psych dx, SA, substance use Collateral from Father Nataly [phone]  No available Sinhalese ([facility])  after ~20mins. Father preferred to use family as , Pt's Sister (Marsha), Pt verbally consented to speak with all family.    HPI: after 2 weeks from surgery and for 2-3 days, Pt became confused, did not sleep, seen crying and laughing. Pt was watching cartoons on TV, got scared, then punched the walls 2 times the day Pt brought into ED. Angry, then calmed down. Pt is more expressive, saying odd things like, "he never tried something like overdosing," "I love my dad, come home early". Tells family that he loves hugging them. Poor recall, can't remember events from last few days.     Sx Screening:  --Depression: Pt says he feels sad a week ago, useless after not getting 's license. Poor energy, seems tired from not sleeping, poor concentration. Normal eating. Endorses active SI, denies intent or plan. ?SIB/SA on 3/21, Pt held Tylenol pills in his hands, ambivalent intent, then gave pills to mother. Father thinks he had headache  --Nel: (+) not sleeping for 4 days, bought T-shirt for sister (out of character), seems to talk a little faster, but not much  (-) poor energy, not irritable or elevated, not grandiose, no increase in goal directed activity  --Psychosis: not aware of paranoia, delusions, AH, VH -- has dreams and sees things    PMH/PSH: nasal septoplasty 3/3/21, otherwise no chronic PMH ---follows PCP Dr. Jerry Wade [phone]; last saw Pt in Feb 2021.  Meds: none  Allerg: none  SH: Lived and graduated high school in [facility], arrived in [place] in 2017. Unemployed 3 months ago, worked at [employer]. Not currently enrolled in classes at [employer], stopped in March 2020 bc of pandemic. Per fam, Pt likes to play video games, maybe excessively  --Dad found empty beer bottles, unaware of details, withdrawal, rehab, or other issues with alcohol  --knows of marijuana use hx, unaware of details  FH: denies psych dx, SA, substance use

## 2021-03-24 LAB
A1C WITH ESTIMATED AVERAGE GLUCOSE RESULT: 5.3 % — SIGNIFICANT CHANGE UP (ref 4–5.6)
CHOLEST SERPL-MCNC: 202 MG/DL — HIGH
ESTIMATED AVERAGE GLUCOSE: 105 MG/DL — SIGNIFICANT CHANGE UP (ref 68–114)
HDLC SERPL-MCNC: 50 MG/DL — SIGNIFICANT CHANGE UP
LIPID PNL WITH DIRECT LDL SERPL: 140 MG/DL — HIGH
NON HDL CHOLESTEROL: 152 MG/DL — HIGH
TRIGL SERPL-MCNC: 60 MG/DL — SIGNIFICANT CHANGE UP

## 2021-03-24 PROCEDURE — 93010 ELECTROCARDIOGRAM REPORT: CPT

## 2021-03-24 PROCEDURE — 99232 SBSQ HOSP IP/OBS MODERATE 35: CPT | Mod: 25

## 2021-03-24 PROCEDURE — 90853 GROUP PSYCHOTHERAPY: CPT

## 2021-03-24 RX ORDER — HALOPERIDOL DECANOATE 100 MG/ML
2 INJECTION INTRAMUSCULAR ONCE
Refills: 0 | Status: DISCONTINUED | OUTPATIENT
Start: 2021-03-24 | End: 2021-04-08

## 2021-03-24 RX ORDER — HALOPERIDOL DECANOATE 100 MG/ML
2 INJECTION INTRAMUSCULAR EVERY 6 HOURS
Refills: 0 | Status: DISCONTINUED | OUTPATIENT
Start: 2021-03-24 | End: 2021-04-08

## 2021-03-24 RX ORDER — CLONAZEPAM 1 MG
0.5 TABLET ORAL AT BEDTIME
Refills: 0 | Status: DISCONTINUED | OUTPATIENT
Start: 2021-03-24 | End: 2021-03-31

## 2021-03-24 RX ADMIN — Medication 2 MILLIGRAM(S): at 09:26

## 2021-03-24 RX ADMIN — Medication 3 MILLIGRAM(S): at 21:44

## 2021-03-24 RX ADMIN — Medication 0.5 MILLIGRAM(S): at 21:44

## 2021-03-24 RX ADMIN — RISPERIDONE 1 MILLIGRAM(S): 4 TABLET ORAL at 21:44

## 2021-03-24 NOTE — BH INPATIENT PSYCHIATRY PROGRESS NOTE - CASE SUMMARY
Sosa is a 19 yo M with no PPHx who presents with new onset of behavioral change including decreased sleep, increased verbal expression/talkativeness, AH, behavioral dysregulation with agitation. On MSE pt presents as odd, with disorganized TP and behavior, possible thought blocking (vs significant language barrier), and limited orientation. Dx impression is psychosis with ddx brief psychotic d/o vs affective psychosis vs psychosis 2/2 GMC; substance induced psychosis much less likely given negative utox and no significant substance use reported. Continued collateral, observation and interview with  needed to refine ddx. Will start risperdal 1 mg HS for psychosis tonight.  Sosa is a 19 yo M with no PPHx who presented with new onset of behavioral change including decreased sleep, increased verbal expression/talkativeness, AH, behavioral dysregulation with agitation.     On MSE pt continues to present as odd, with disorganized TP and behavior, possible thought blocking (vs significant language barrier). Orientation is improved but still not full, and ability to describe events leading to admission is poor. Dx impression is psychosis with ddx brief psychotic d/o most likely; with less likely affective psychosis vs psychosis 2/2 GMC; substance induced psychosis much less likely given negative utox and no significant substance use reported. Continued collateral, observation and interview with  needed to refine ddx. Continue risperdal 1 mg HS for psychosis and add clonazepam 0.5 mg HS for insomnia. Given improvement noticed after PRN ativan will continue to assess for catatonia and need for standing BZ to be added to regimen.

## 2021-03-24 NOTE — BH INPATIENT PSYCHIATRY PROGRESS NOTE - NSBHASSESSSUMMFT_PSY_ALL_CORE
19 yo male, domiciled with family, without significant PPHx, no known suicide attempts, legal, violence or substance history, with PMH/PSHx of nasal septoplasty 3/3/21, who was BIB family for concern for confusion in the context of decreased sleep and increased talkativeness.     Working diagnosis is unspecified psychosis, in light of active psychotic Sx, including +AH, odd relatedness, concrete, illogical, disorganized TP, intense labile mood, recent insomnia. Will offer  services and continue to assess for formal thought disorder vs barriers of limited English proficiency. Will also obtain collateral from family. At this time, less evidence for psychosis 2/2 substance use or general medical condition vs affective psychosis-but remain on differential.     ###DRAFT  Currently, patient presents as psychotic with unpredictable with labile mood, concrete disorganized and illogical TP, appears distracted on interview. Last endorsed AH yesterday, of people singing. May also present with possible racing thoughts. Denies current AVH. Denies SI/HI. Insight and judgement are limited. Sleep remains tenuous. Patient is only oriented to self and does not recall events leading to hospitalization. Continued inpatient admission required for safety and symptom stabilization.    Plan:  1. Legal: Admitted on 9.39 emergency involuntary status  2. Safety: No reported SI/SIB/HI/VI currently on unit; continue routine observation  3. Psychiatric:   -start Risperdal 1mg qhs for psychosis  -continue melatonin for sleep  -PRN anxiety: Atarax 50mg q6hr  -PRN agitation: Ativan 2mg/ Benadryl 50mg q6 PO/IM  4. Therapy: Group & milieu therapy as tolerated  5. Medical: No acute medical needs identified. No CPAP  -3/24 lipid profile with elevated , non  -- f/u PCP  -f/u EKG  6. Collateral: obtained from father 3/23 (chart note)  7. Disposition: when stable   19 yo male, domiciled with family, without significant PPHx, no known suicide attempts, legal, violence or substance history, with PMH/PSHx of nasal septoplasty 3/3/21, who was BIB family for concern for confusion in the context of decreased sleep and increased talkativeness.     Working diagnosis is unspecified psychosis, in light of active psychotic Sx, including +AH, odd relatedness, concrete, illogical, disorganized TP, intense labile mood, recent insomnia. Will offer  services and continue to assess for formal thought disorder vs barriers of limited English proficiency. Will also obtain collateral from family. At this time, less evidence for psychosis 2/2 substance use or general medical condition vs affective psychosis-but remain on differential.     ###DRAFT  Currently, patient presents as psychotic with unpredictable with labile mood, concrete disorganized and illogical TP, appears distracted on interview. Last endorsed AH 2 days ago, of people saying "hello." Denies current AVH. Denies SI/HI. Insight and judgement are limited. Sleep remains tenuous. Patient is A&Ox3 with prompting but does not recall events leading to hospitalization. Continued inpatient admission required for safety and symptom stabilization.    Plan:  1. Legal: Admitted on 9.39 emergency involuntary status  2. Safety: No reported SI/SIB/HI/VI currently on unit; continue routine observation  3. Psychiatric:   -start Risperdal 1mg qhs for psychosis  -continue melatonin for sleep  -PRN anxiety: Atarax 50mg q6hr  -PRN agitation: Ativan 2mg/ Benadryl 50mg q6 PO/IM  4. Therapy: Group & milieu therapy as tolerated  5. Medical: No acute medical needs identified. No CPAP  -3/24 lipid profile with elevated , non  -- f/u PCP  -f/u EKG  6. Collateral: obtained from father 3/23 (chart note)  7. Disposition: when stable   21 yo male, domiciled with family, without significant PPHx, no known suicide attempts, legal, violence or substance history, with PMH/PSHx of nasal septoplasty 3/3/21, who was BIB family for concern for confusion in the context of decreased sleep and increased talkativeness.     Working diagnosis is unspecified psychosis, in light of active psychotic Sx, including +AH, odd relatedness, concrete, illogical, disorganized TP, intense labile mood, recent insomnia. Collateral from family pertinent for recent onset of mood lability, disorganized thoughts, and insomnia -- denies hx of prior psychiatric sx, Pt emigrated from [place] in [place]. At this time, less evidence for psychosis 2/2 substance use or general medical condition vs affective psychosis-but remain on differential. Will offer  services and continue to assess for formal thought disorder vs barriers of limited English proficiency.    Currently, patient presents as psychotic, unpredictable with labile mood. TP remains concrete, disorganized, appears distracted on interview -- plan to assess with scheduled use of  tomorrow.  Speech is more spontaneous today and orientation improved significantly compared to prior day after receiving PRN Ativan 2mg -- will monitor for signs of catatonia and consider standing Ativan. Last endorsed AH 2 days ago, of people saying "hello." Denies current AVH. Denies SI/HI. Insight and judgement are limited. Sleep remains tenuous. Patient is A&Ox3 with prompting but limited memory on events leading to hospitalization. Continued inpatient admission required for safety and symptom stabilization.    Plan:  1. Legal: Admitted on 9.39 emergency involuntary status  2. Safety: No reported SI/SIB/HI/VI currently on unit; continue routine observation  3. Psychiatric:   -continue Risperdal 1mg qhs for psychosis  -add Klonopin 0.5 mg qhs for mood stabilization and anxiety  -continue melatonin for sleep  -PRN anxiety: Atarax 50mg q6hr  -PRN agitation: Ativan 2mg/ Benadryl 50mg q6 PO/IM  4. Therapy: Group & milieu therapy as tolerated  5. Medical: No acute medical needs identified. No CPAP  -3/24 lipid profile with elevated , non  -- f/u PCP  -f/u EKG  6. Collateral: obtained from father 3/23 (chart note)  7. Disposition: when stable

## 2021-03-24 NOTE — BH INPATIENT PSYCHIATRY PROGRESS NOTE - NSBHFUPINTERVALHXFT_PSY_A_CORE
Chart reviewed. Case discussed with interdisciplinary team. No acute events overnight. Per staff pt appears disorganized, internally preoccupied, stares, visible. Compliant with standing medication. Refused PRN offered for sleep. Per sleep log, interrupted sleep of 3.5hr total.    ## DRAFT Pt preferred to speak in English instead of getting a  for Sinhalese-he was offered  services multiple times and refused. He was only oriented to person. The patient is unable to remember the events leading up to his admission    Reports good PO intake currently. Encouraged adequate hydration in light of orthostatic VS, denies dizziness. No physical complaints today. Chart reviewed. Case discussed with interdisciplinary team. No acute events overnight. Per staff pt appears disorganized, internally preoccupied, stares, visible. Compliant with standing medication. Refused PRN offered for sleep. Per sleep log, interrupted sleep of 3.5hr total. Per pt he slept 5-6 hours.    Pt preferred to speak in English instead of getting a  for Sinhalese-he was offered  services multiple times and refused. He was A&Ox3 today with prompting. He said he is feeling much better today and he has been learning about "new personalities" and English. 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 His last AHs were 2 days ago and he heard voices saying "hello" to him. The patient is unable to remember the events leading up to his admission. When asked about why he punched the wall before his admission as reported by his parents, the patient said I had "a lot of feeling and punched the wall and then hugged them." He says he is not sad currently but prior to admission he was stressed because he was not sleeping well and was waking up throughout the night. No VHs, SI/HI, NSSIB, paranoia.    Reports good PO intake currently. Encouraged adequate hydration in light of orthostatic VS, denies dizziness. No physical complaints today. Chart reviewed. Case discussed with interdisciplinary team. No acute events overnight. Per staff pt appears disorganized, internally preoccupied, stares, visible. Compliant with standing medication. Refused PRN offered for sleep -- received Ativan 2mg at 9:30am today and was visibly confused. Per sleep log, interrupted sleep of 3.5hr total. Pt believes he slept 5-6 hours.    Pt preferred to speak in English instead of getting a  for Sinhalese --  also not available even with attempts to reserve one. He was oriented to location (hospital), "late March", year, and [place] president when given options. He feels much better today because he has been learning about "new personalities" and English. 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 Says he is "back to [his] normal self", denies any difference from baseline. When asked about AH, reports hearing noises in ceiling, reports he last had AH 2 days ago of soft voices saying "hello". He is still unable to remember the events leading up to his admission. When asked about why he punched the wall at home as reported by his parents, Pt reports I had "a lot of feelings and punched the wall and then hugged them." He denies feeling sad currently but prior to admission he was stressed because he was not sleeping well and was waking up throughout the night. Denies VH, paranoia. Denies SI/HI.    Reports good PO intake currently. Encouraged adequate hydration in light of orthostatic VS, denies dizziness. No physical complaints today.

## 2021-03-25 PROCEDURE — 99232 SBSQ HOSP IP/OBS MODERATE 35: CPT

## 2021-03-25 RX ADMIN — Medication 0.5 MILLIGRAM(S): at 21:49

## 2021-03-25 RX ADMIN — Medication 3 MILLIGRAM(S): at 21:48

## 2021-03-25 RX ADMIN — RISPERIDONE 1 MILLIGRAM(S): 4 TABLET ORAL at 21:48

## 2021-03-25 NOTE — BH INPATIENT PSYCHIATRY PROGRESS NOTE - NSBHFUPINTERVALHXFT_PSY_A_CORE
Chart reviewed. Case discussed with interdisciplinary team. No acute events overnight. Per staff pt appears disorganized, internally preoccupied, stares, and has little shakes. Compliant with standing medication with encouragement. No PRNs overnight -- received Ativan 2mg at 9:30am yesterday and was visibly confused. Per sleep log, pt slept from 12:15-5a.    [employer]  (ID: [id number]) was used. Patient was oriented to location (hospital) said the date was "March 3rd" and he knew the year. He said today he was feeling "okay" and showed the team a drawing of an karo at the beginning of the interview. When asked about AH, reports he last had AH 1 day ago of voices saying "hello". He also endorsed previous VHs of monsters that were "horny" but no VHs currently. When asked about events leading up to admission, at first the patient said he didn't remember, then he said "yes, I remember." He said he was "sleeping, then woke, then went to sleep and woke up" and afterwards he ate, drank, and relaxed. He also endorsed hitting the wall. He said he drank water, tea, and coffee. He mentioned drinking alcohol but did not specify what he drank or how much. Patient was able to remember his surgery.    When asked about his energy level, he stated that he currently has "very good energy" and then later stated his mood was "sleepy." He said that he is "sometimes happy, sometimes sad." He said he had headaches associated with his sadness and needed to go out and run to feel relaxed. he said he was not sad at home. He endorsed feelings of depression but denies any hopelessness. He also made the statement "I like to make a car" when questioned further about his sadness and depression. He also made other bizarre statements such as "I feel like I'm in love" when asked about his sadness. Endorsed self harm when he was at home, but no self harm thoughts currently. He said that he did not act on his self harm thoughts at home and was able to control it. He denied trying to take pills like his family reported. Denies paranoia. Denies SI/HI.     Reports decreased appetite. When asked further about appetite, he went on to make statements about how people here make him tired. Encouraged adequate hydration in light of orthostatic VS, denies dizziness. No physical complaints today. Patient was breathing deeply during the interview, which he attributed to his possible asthma and said it gave him energy when he took deep breaths. The patient stated he does not want to sleep and he wants to sleep less because he feels that it is better for him. Chart reviewed. Case discussed with interdisciplinary team. No acute events overnight. Per staff pt appears disorganized, internally preoccupied, stares, and has little shakes. Compliant with standing medication with encouragement. No PRNs overnight -- received Ativan 2mg at 9:30am yesterday and was visibly confused. Per sleep log, pt slept from 12:15-5a.    [employer]  (ID: [id number]) was used. Patient was oriented to location (hospital) said the date was "March 3rd" and he knew the year. Patient was confused and surprised when he was told that the date was March 25th. He said today he was feeling "okay" and showed the team a drawing of an karo at the beginning of the interview. When asked about AH, reports he last had AH 1 day ago of voices saying "hello". He also endorsed previous VHs of monsters that were "horny" but no VHs currently. When asked about events leading up to admission, at first the patient said he didn't remember, then he said "yes, I remember." He said he was "sleeping, then woke, then went to sleep and woke up" and afterwards he ate, drank, and relaxed. He also endorsed hitting the wall. He said he drank water, tea, and coffee. He mentioned drinking alcohol but did not specify what he drank or how much. Patient was able to remember his surgery.    When asked about his energy level, he stated that he currently has "very good energy" and then later stated his mood was "sleepy." He said that he is "sometimes happy, sometimes sad." He said he had headaches associated with his sadness and needed to go out and run to feel relaxed. he said he was not sad at home. He endorsed feelings of depression but denies any hopelessness. He also made the statement "I like to make a car" when questioned further about his sadness and depression. He also made other bizarre statements such as "I feel like I'm in love" when asked about his sadness. Endorsed self harm when he was at home, but no self harm thoughts currently. He said that he did not act on his self harm thoughts at home and was able to control it. He denied trying to take pills like his family reported. Denies paranoia. Denies SI/HI.     Reports decreased appetite. When asked further about appetite, he went on to make statements about how people here make him tired. Encouraged adequate hydration in light of orthostatic VS, denies dizziness. No physical complaints today. Patient was breathing deeply during the interview, which he attributed to his possible asthma and said it gave him energy when he took deep breaths. The patient stated he does not want to sleep and he wants to sleep less because he feels that it is better for him. DRAFT ###     Chart reviewed. Case discussed with interdisciplinary team. No acute events overnight. Per staff pt appears disorganized, internally preoccupied, stares, and has little shakes. Compliant with standing medication with encouragement. No PRNs overnight -- received Ativan 2mg at 9:30am yesterday and was visibly confused. Per sleep log, pt slept from 12:15-5a.    [employer]  (ID: [id number]) was used. Patient was oriented to location (hospital) said the date was "March 3rd" and he knew the year. Patient was confused and surprised when he was told that the date was March 25th. He said today he was feeling "okay" and showed the team a drawing of an karo at the beginning of the interview. When asked about AH, reports he last had AH 1 day ago of voices saying "hello". He also endorsed previous VHs of monsters that were "horny" but no VHs currently. When asked about events leading up to admission, at first the patient said he didn't remember, then he said "yes, I remember." He said he was "sleeping, then woke, then went to sleep and woke up" and afterwards he ate, drank, and relaxed. He also endorsed hitting the wall. He said he drank water, tea, and coffee. He mentioned drinking alcohol but did not specify what he drank or how much. Patient was able to remember his surgery.    When asked about his energy level, he stated that he currently has "very good energy" and then later stated his mood was "sleepy." He said that he is "sometimes happy, sometimes sad." He said he had headaches associated with his sadness and needed to go out and run to feel relaxed. he said he was not sad at home. He endorsed feelings of depression but denies any hopelessness. He also made the statement "I like to make a car" when questioned further about his sadness and depression. He also made other bizarre statements such as "I feel like I'm in love" when asked about his sadness. Endorsed self harm when he was at home, but no self harm thoughts currently. He said that he did not act on his self harm thoughts at home and was able to control it. He denied trying to take pills like his family reported. Denies paranoia. Denies SI/HI.     Reports decreased appetite. When asked further about appetite, he went on to make statements about how people here make him tired. Encouraged adequate hydration in light of orthostatic VS, denies dizziness. No physical complaints today. Patient was breathing deeply during the interview, which he attributed to his possible asthma and said it gave him energy when he took deep breaths. The patient stated he does not want to sleep and he wants to sleep less because he feels that it is better for him. Chart reviewed. Case discussed with interdisciplinary team. No acute events overnight. Per staff pt appears disorganized, internally preoccupied, stares. Compliant with standing medication with encouragement. No PRNs overnight -- received Ativan 2mg yesterday morning, was visibly confused. Per sleep log, pt slept from 12:15-5a.    Interviewed Pt with [employer] phone  (ID: [id number]). Patient was observed breathing deeply throughout interview -- when asked about this, Pt says "asthma" then says breathing gave him energy. When asked about events leading up to admission, at first the patient could not remember, then shortly after, says "yes, I remember... I was sleeping, then woke, then went to sleep and woke up". He admits to hitting the wall at home, yet unable to share motive or intent. He endorses alcohol use but could not specify what he drank or how much. Endorsed self harm urges when he was at home, but was able to control at home. Pt denied trying to overdose on pills as his family reported. Today, patient was oriented to location (hospital) and year, however was confused when told date, believed it was "March 3rd" and recalls he had surgery then.    Today he feels "okay", showed team a drawing of an karo. States mood is "good, sleepy now" and is "sometimes happy, sometimes sad." He said he had headaches associated with his sadness and needed to go out and run to feel relaxed. Endorsed prior depressed mood, however denies any hopelessness and low energy, Pt was not sad at home. States "I like to make a car" and "I feel like I'm in love" when asked to describe his sadness. Denies current AVH -- reports hearing voices say "hello" a day ago and prior VH of monsters that were "horny". Denies paranoia. Denies SI/HI and self harm thoughts.    Reports decreased appetite. When asked further about appetite, he mentions how people here make him tired. Sleep is fair. Pt shares he does not want to sleep, and wants to sleep less because he feels that it is better for him. Encouraged adequate hydration in light of orthostatic VS, denies dizziness. No physical complaints today.  Chart reviewed. Case discussed with interdisciplinary team. No acute events overnight. Per staff pt appears disorganized, internally preoccupied, stares. Compliant with standing medication with encouragement. No PRNs overnight -- received Ativan 2mg yesterday morning, was visibly confused. Per sleep log, pt slept from 12:15-5a.    Interviewed Pt with [employer] phone  (ID: [id number]). Patient was observed breathing bizarrely and deeply throughout interview -- when asked about this, Pt says he has "asthma," and later says that breathing like that gave him energy. When asked about events leading up to admission, at first the patient could not remember, then shortly after, says "yes, I remember... I was sleeping, then woke, then went to sleep and woke up". He admits to hitting the wall at home, yet unable to share motive or intent. He endorses alcohol use but could not specify what he drank or how much. Endorsed having thoughts about harming himself (couldn't elaborate more about detail) when he was at home, but was able to control his behavior. Pt denied trying to overdose on pills (as his family reported that he came to them with handful of pills but couldn't explain why). Today, patient was oriented to location (hospital) and year, however was confused when told date, believed it was "March 3rd" and recalls he had surgery then.    Today he feels "okay", showed team a drawing of an karo. States mood is "good, sleepy now" and is "sometimes happy, sometimes sad." He said he had headaches associated with his sadness and needed to go out and run to feel relaxed. Endorsed prior depressed mood, however denies any hopelessness and low energy. States "I like to make a car" and "I feel like I'm in love" when asked to describe his sadness. Denies current AVH -- reports hearing voices say "hello" a day ago and seeing monsters that were "horny" (unclear nature of what pt was seeing). Denies paranoia. Denies SI/HI and self harm thoughts.    Reports decreased appetite. When asked further about appetite, he mentions how people here make him tired. Sleep is fair. Pt shares he does not want to sleep, and wants to sleep less because he feels that it is better for him. Encouraged adequate hydration in light of orthostatic VS, denies dizziness. No physical complaints today.

## 2021-03-25 NOTE — BH INPATIENT PSYCHIATRY PROGRESS NOTE - NSBHASSESSSUMMFT_PSY_ALL_CORE
21 yo male, domiciled with family, without significant PPHx, no known suicide attempts, legal, violence or substance history, with PMH/PSHx of nasal septoplasty 3/3/21, who was BIB family for concern for confusion in the context of decreased sleep and increased talkativeness.     Working diagnosis is unspecified psychosis, in light of active psychotic Sx, including +AH, odd relatedness, concrete, illogical, disorganized TP, intense labile mood, recent insomnia. Collateral from family pertinent for recent onset of mood lability, disorganized thoughts, and insomnia -- denies hx of prior psychiatric sx, Pt emigrated from [facility] in 2017. At this time, less evidence for psychosis 2/2 substance use or general medical condition vs affective psychosis-but remain on differential. Will offer  services and continue to assess for formal thought disorder vs barriers of limited English proficiency.    Currently, patient presents as psychotic, unpredictable with labile mood. TP remains concrete, disorganized, appears distracted on interview -- plan to assess with scheduled use of  tomorrow.  Speech is more spontaneous today and orientation improved significantly compared to prior day after receiving PRN Ativan 2mg -- will monitor for signs of catatonia and consider standing Ativan. Last endorsed AH 2 days ago, of people saying "hello." Denies current AVH. Denies SI/HI. Insight and judgement are limited. Sleep remains tenuous. Patient is A&Ox3 with prompting but limited memory on events leading to hospitalization. Continued inpatient admission required for safety and symptom stabilization.    Plan:  1. Legal: Admitted on 9.39 emergency involuntary status  2. Safety: No reported SI/SIB/HI/VI currently on unit; continue routine observation  3. Psychiatric:   -continue Risperdal 1mg qhs for psychosis  -add Klonopin 0.5 mg qhs for mood stabilization and anxiety  -continue melatonin for sleep  -PRN anxiety: Atarax 50mg q6hr  -PRN agitation: Ativan 2mg/ Benadryl 50mg q6 PO/IM  4. Therapy: Group & milieu therapy as tolerated  5. Medical: No acute medical needs identified. No CPAP  -3/24 lipid profile with elevated , non  -- f/u PCP  -f/u EKG  6. Collateral: obtained from father 3/23 (chart note)  7. Disposition: when stable   19 yo male, domiciled with family, without significant PPHx, no known suicide attempts, legal, violence or substance history, with PMH/PSHx of nasal septoplasty 3/3/21, who was BIB family for concern for confusion in the context of decreased sleep and increased talkativeness.     Working diagnosis is unspecified psychosis, in light of active psychotic Sx, including +AH, odd relatedness, concrete, illogical, disorganized TP, intense labile mood, recent insomnia. Collateral from family pertinent for recent onset of mood lability, disorganized thoughts, and insomnia -- denies hx of prior psychiatric sx, Pt emigrated from [facility] in 2017. At this time, less evidence for psychosis 2/2 substance use or general medical condition vs affective psychosis-but remain on differential. Will offer  services and continue to assess for formal thought disorder vs barriers of limited English proficiency.    Currently, patient presents as psychotic, unpredictable with labile mood. TP remains concrete, disorganized, appears distracted on interview even with use of .  Speech is more spontaneous today and orientation remains the same as yesterday. Last endorsed AH 1 day ago, of people saying "hello." Also endorsed VHs of "horny" monsters in the past. Denies current AVH. Denies SI/HI. Insight and judgement are limited. Sleep remains tenuous. Patient is A&Ox3 with prompting but limited memory on events leading to hospitalization. Continued inpatient admission required for safety and symptom stabilization.    Plan:  1. Legal: Admitted on 9.39 emergency involuntary status  2. Safety: No reported SI/SIB/HI/VI currently on unit; continue routine observation  3. Psychiatric:   -continue Risperdal 1mg qhs for psychosis  -add Klonopin 0.5 mg qhs for mood stabilization and anxiety  -continue melatonin for sleep  -PRN anxiety: Atarax 50mg q6hr  -PRN agitation: Ativan 2mg/ Benadryl 50mg q6 PO/IM  4. Therapy: Group & milieu therapy as tolerated  5. Medical: No acute medical needs identified. No CPAP  -3/24 lipid profile with elevated , non  -- f/u PCP  -f/u EKG  6. Collateral: obtained from father 3/23 (chart note)  7. Disposition: when stable   21 yo male, domiciled with family, without significant PPHx, no known suicide attempts, legal, violence or substance history, with PMH/PSHx of nasal septoplasty 3/3/21, who was BIB family for concern for confusion in the context of decreased sleep and increased talkativeness.     Working diagnosis is unspecified psychosis, in light of active psychotic Sx, including +AH, odd relatedness, concrete, illogical, disorganized TP, intense labile mood, recent insomnia. Collateral from family pertinent for recent onset of mood lability, disorganized thoughts, and insomnia -- denies hx of prior psychiatric sx, Pt emigrated from [facility] in 2017. At this time, less evidence for psychosis 2/2 substance use or general medical condition vs affective psychosis-but remain on differential.     Currently, patient presents as psychotic, unpredictable with labile mood. TP remains concrete, disorganized, appears distracted on interview even with use of .  Speech is more spontaneous today and orientation remains the same as yesterday. Last endorsed AH 1 day ago, of people saying "hello." Also endorsed VHs of "horny" monsters in the past. Denies current AVH. Denies SI/HI. Insight and judgement are limited. Sleep remains tenuous. Patient is A&Ox3 with prompting but limited memory on events leading to hospitalization. Continued inpatient admission required for safety and symptom stabilization.    Plan:  1. Legal: Admitted on 9.39 emergency involuntary status  2. Safety: No reported SI/SIB/HI/VI currently on unit; continue routine observation  3. Psychiatric:   -continue Risperdal 1mg qhs for psychosis  -add Klonopin 0.5 mg qhs for mood stabilization and anxiety  -continue melatonin for sleep  -PRN anxiety: Atarax 50mg q6hr  -PRN agitation: Ativan 2mg/ Benadryl 50mg q6 PO/IM  4. Therapy: Group & milieu therapy as tolerated  5. Medical: No acute medical needs identified. No CPAP  -3/24 lipid profile with elevated , non  -- f/u PCP  -f/u EKG  6. Collateral: obtained from father 3/23 (chart note)  7. Disposition: when stable   21 yo male, domiciled with family, without significant PPHx, no known suicide attempts, legal, violence or substance history, with PMH/PSHx of nasal septoplasty 3/3/21, who was BIB family for concern for confusion in the context of decreased sleep and increased talkativeness.     Working diagnosis is unspecified psychosis, in light of active psychotic Sx, including +AH, odd relatedness, concrete, illogical, disorganized TP, intense labile mood, recent insomnia. Collateral from family pertinent for recent onset of mood lability, disorganized thoughts, and insomnia -- denies hx of prior psychiatric sx, Pt emigrated from [facility] in 2017. At this time, less evidence for psychosis 2/2 substance use or general medical condition vs affective psychosis-but remain on differential.     Interviewed today with [facility]  - Pt appears less thought blocked, may be more likely due to language barrier.     Currently, patient presents as psychotic, unpredictable with labile mood. TP remains concrete, disorganized, appears distracted on interview even with use of .  Speech is more spontaneous today and orientation remains the same as yesterday. Last endorsed AH 1 day ago, of people saying "hello." Also endorsed VHs of "horny" monsters in the past. Denies current AVH. Denies SI/HI. Insight and judgement are limited. Sleep remains tenuous. Patient is A&Ox3 with prompting but limited memory on events leading to hospitalization. Continued inpatient admission required for safety and symptom stabilization.    Plan:  1. Legal: Admitted on 9.39 emergency involuntary status  2. Safety: No reported SI/SIB/HI/VI currently on unit; continue routine observation  3. Psychiatric:   -continue Risperdal 1mg qhs for psychosis -- if tolerating consider increasing in a few days  -continue Klonopin 0.5 mg qhs for mood stabilization and anxiety  -continue melatonin for sleep  -PRN anxiety: Atarax 50mg q6hr  -PRN agitation: Ativan 2mg/ Benadryl 50mg q6 PO/IM  4. Therapy: Group & milieu therapy as tolerated  5. Medical: No acute medical needs identified. No CPAP  -3/24 lipid profile with elevated , non  -- f/u PCP  -f/u EKG  6. Collateral: obtained from father 3/23 (chart note)  7. Disposition: when stable   21 yo male, domiciled with family, without significant PPHx, no known suicide attempts, legal, violence or substance history, with PMH/PSHx of nasal septoplasty 3/3/21, who was BIB family for concern for confusion in the context of decreased sleep and increased talkativeness.     Working diagnosis is unspecified psychosis, in light of active psychotic Sx, including +AH, odd relatedness, concrete, illogical, disorganized TP, intense labile mood, recent insomnia. Collateral from family pertinent for recent onset of mood lability, disorganized thoughts, and insomnia -- denies hx of prior psychiatric sx, Pt emigrated from [facility] in 2017. At this time, less evidence for psychosis 2/2 substance use or general medical condition (considered post-surgical psychosis, surgery >2 weeks w/o complications) vs affective psychosis-but remain on differential.     Interviewed Pt today with [facility]  -- Pt seems less thought blocked, (may be associated with language barrier) however still presents as psychotic, unpredictable with labile mood. TP remains concrete, disorganized, appears distracted, even with use of .  Speech is more spontaneous today. Denies current AVH -- endorsed prior AH (1 day ago, people saying "hello") and prior VH of "horny monsters". Denies SI/HI -- unclear of prior SA and SIB but Pt denies. Insight and judgement are limited -- difficulty recalling events leading to admission. Sleep remains tenuous. Continued inpatient admission required for safety and symptom stabilization.    Plan:  1. Legal: Admitted on 9.39 emergency involuntary status  2. Safety: No reported SI/SIB/HI/VI currently on unit; continue routine observation  3. Psychiatric:   -continue Risperdal 1mg qhs for psychosis -- if tolerating consider increasing in a few days  -continue Klonopin 0.5 mg qhs for mood stabilization and anxiety  -continue melatonin for sleep  -PRN anxiety: Atarax 50mg q6hr  -PRN agitation: Ativan 2mg/ Benadryl 50mg q6 PO/IM  4. Therapy: Group & milieu therapy as tolerated  5. Medical: No acute medical needs identified. No CPAP  -3/24 lipid profile with elevated , non  -- f/u PCP  -f/u EKG  6. Collateral: obtained from father 3/23 (chart note)  7. Disposition: when stable   19 yo male, domiciled with family, without significant PPHx, no known suicide attempts, legal, violence or substance history, with PMH/PSHx of nasal septoplasty 3/3/21, who was BIB family for concern for confusion in the context of decreased sleep and increased talkativeness.     Working diagnosis is unspecified psychosis, in light of presentation with active psychotic Sx, including +AH, odd relatedness, concrete, illogical, disorganized TP, intense labile mood, recent insomnia. Collateral from family pertinent for recent onset of mood lability, disorganized thoughts, and insomnia -- denies hx of prior psychiatric sx, Pt emigrated from [facility] in 2017. At this time, less evidence for psychosis 2/2 substance use or general medical condition (considered post-surgical psychosis, surgery >2 weeks w/o complications) vs affective psychosis-but remain on differential.     Interviewed Pt today with [facility]  -- Pt seems less thought blocked with more spontaneous speech, however still presents as psychotic with concrete disorganized TP and distractibility. Denies current AVH -- endorsed prior AH (1 day ago, people saying "hello") and prior possible VH of "horny monsters". Denies SI/HI -- unclear of prior SA and SIB but Pt denies. Insight and judgement are limited -- difficulty recalling events leading to admission. Sleep remains tenuous. Continued inpatient admission required for safety and symptom stabilization.    Plan:  1. Legal: Admitted on 9.39 emergency involuntary status  2. Safety: No reported SI/SIB/HI/VI currently on unit; continue routine observation  3. Psychiatric:   -continue Risperdal 1mg qhs for psychosis -- if tolerating will likely increase  -continue Klonopin 0.5 mg qhs for mood stabilization and anxiety  -continue melatonin for sleep  -PRN anxiety: Atarax 50mg q6hr  -PRN agitation: Ativan 2mg/ Benadryl 50mg q6 PO/IM  4. Therapy: Group & milieu therapy as tolerated  5. Medical: No acute medical needs identified. No CPAP  -3/24 lipid profile with elevated , non  -- f/u PCP  6. Collateral: obtained from father 3/23 (chart note)  7. Disposition: when stable

## 2021-03-25 NOTE — BH INPATIENT PSYCHIATRY PROGRESS NOTE - CASE SUMMARY
Sosa is a 19 yo M with no PPHx who presented with new onset of behavioral change including decreased sleep, increased verbal expression/talkativeness, AH, behavioral dysregulation with agitation.     On MSE pt continues to present as odd, with disorganized TP and behavior, possible thought blocking (vs significant language barrier). Orientation is improved but still not full, and ability to describe events leading to admission is poor. Dx impression is psychosis with ddx brief psychotic d/o most likely; with less likely affective psychosis vs psychosis 2/2 GMC; substance induced psychosis much less likely given negative utox and no significant substance use reported. Continued collateral, observation and interview with  needed to refine ddx. Continue risperdal 1 mg HS for psychosis and add clonazepam 0.5 mg HS for insomnia. Given improvement noticed after PRN ativan will continue to assess for catatonia and need for standing BZ to be added to regimen.  Sosa is a 19 yo M with no PPHx who presented with new onset of behavioral change including decreased sleep, increased verbal expression/talkativeness, AH, behavioral dysregulation with agitation.     On MSE pt continues to present as odd, with disorganized TP and behavior. Speech is less latent with less thought blocking with interview with . Orientation is improved but still not full, and ability to describe events leading to admission is poor. Dx impression is psychosis with ddx brief psychotic d/o most likely; with less likely affective psychosis vs psychosis 2/2 GMC (post-surgical psychosis?); substance induced psychosis much less likely given negative utox and no significant substance use reported. Continued collateral, observation and interview with  needed to refine ddx. Continue risperdal 1 mg HS for psychosis and continue clonazepam 0.5 mg HS for insomnia. Given improvement noticed after PRN ativan will continue to assess for catatonia and need for standing BZ to be added to regimen.

## 2021-03-25 NOTE — BH INPATIENT PSYCHIATRY PROGRESS NOTE - MSE UNSTRUCTURED FT
The patient appears stated age, disheveled, and dressed in hospital gown.  The patient is calm, mostly cooperative with the interview, although distracted and requires redirection, maintained appropriate to avoidant eye contact with odd relatedness.  No psychomotor agitation or retardation noted, no abnormal movements.  Steady gait observed.  The patient's speech is more spontaneous, normal in tone and volume.  The patient's mood is "better than yesterday."  Affect is labile, inappropriate.  Thought process is concrete with interview questions, disorganized at times. Thought content notable for minimal spontaneous content, inability to provide history, and wanting to see family. Denies current delusional content.  Denies any suicidal or homicidal ideation, intent, or plan. Denies current AH/VH.  Cognition grossly intact, AAOx3 with prompting. Insight is poor.  Judgment is poor. Impulse control has been fair on the unit. The patient appears stated age, disheveled, and dressed in hospital gown.  The patient is calm, mostly cooperative with the interview, although distracted and requires redirection, maintained appropriate to avoidant eye contact with odd relatedness.  No psychomotor agitation or retardation noted, no abnormal movements.  Steady gait observed.  The patient's speech is more spontaneous, normal in tone and volume.  The patient's mood is "sleepy."  Affect is labile, inappropriate.  Thought process is concrete with interview questions, disorganized at times. Thought content notable for minimal spontaneous content, inability to provide history, and wanting to see family. Denies current delusional content.  Denies any suicidal or homicidal ideation, intent, or plan. Denies current AH/VH.  Cognition grossly intact, AAOx3 with prompting. Insight is poor.  Judgment is poor. Impulse control has been fair on the unit. The patient appears stated age, disheveled, and dressed in hospital gown.  The patient is calm, mostly cooperative with the interview, although distracted and requires redirection, maintained appropriate to avoidant eye contact with odd relatedness.  No psychomotor agitation or retardation noted, no abnormal movements.  Steady gait observed.  The patient's speech is more spontaneous than on admission, normal in tone and normal to low volume.  The patient's mood is "sometimes happy, sometimes sad."  Affect is labile, inappropriate.  Thought process is concrete with interview questions, disorganized with loose associations at times, illogical. Thought content notable for minimal spontaneous content, inability to provide history. Denies current delusional content.  Denies any suicidal or homicidal ideation, intent, or plan. Denies current AH/VH.  Cognition grossly intact, AAOx3 with prompting. Insight is poor.  Judgment is poor. Impulse control has been fair to poor on the unit. The patient appears stated age, disheveled, and dressed in hospital gown.  The patient is calm, mostly cooperative with the interview, although distracted and requires redirection, maintained appropriate to avoidant eye contact with odd relatedness.  No psychomotor agitation, some mild retardation noted, no abnormal movements.  Steady gait observed.  The patient's speech is more spontaneous than on admission, normal in tone and normal to low volume.  The patient's mood is "sometimes happy, sometimes sad."  Affect is neutral to intense, inappropriate, not quite mood congruent.  Thought process is concrete with interview questions, disorganized with loose associations at times, illogical. Thought content notable for minimal spontaneous content, inability to provide history. Denies current delusional content.  Denies any suicidal or homicidal ideation, intent, or plan. Denies current AH/VH.  Cognition grossly intact, AAOx3 with prompting. Insight is poor.  Judgment is poor. Impulse control has been fair to poor on the unit.

## 2021-03-26 LAB
CULTURE RESULTS: SIGNIFICANT CHANGE UP
CULTURE RESULTS: SIGNIFICANT CHANGE UP
SPECIMEN SOURCE: SIGNIFICANT CHANGE UP
SPECIMEN SOURCE: SIGNIFICANT CHANGE UP

## 2021-03-26 PROCEDURE — 99232 SBSQ HOSP IP/OBS MODERATE 35: CPT | Mod: 25

## 2021-03-26 PROCEDURE — 90853 GROUP PSYCHOTHERAPY: CPT

## 2021-03-26 RX ORDER — RISPERIDONE 4 MG/1
2 TABLET ORAL AT BEDTIME
Refills: 0 | Status: DISCONTINUED | OUTPATIENT
Start: 2021-03-26 | End: 2021-04-08

## 2021-03-26 RX ADMIN — Medication 0.5 MILLIGRAM(S): at 21:19

## 2021-03-26 RX ADMIN — Medication 3 MILLIGRAM(S): at 21:20

## 2021-03-26 RX ADMIN — RISPERIDONE 2 MILLIGRAM(S): 4 TABLET ORAL at 21:20

## 2021-03-26 NOTE — BH INPATIENT PSYCHIATRY PROGRESS NOTE - CASE SUMMARY
Sosa is a 19 yo M with no PPHx who presented with new onset of behavioral change including decreased sleep, increased verbal expression/talkativeness, AH, behavioral dysregulation with agitation.     On MSE pt continues to present as odd, with disorganized TP and behavior. Speech is less latent with less thought blocking with interview with . Orientation is improved but still not full, and ability to describe events leading to admission is poor. Dx impression is psychosis with ddx brief psychotic d/o most likely; with less likely affective psychosis vs psychosis 2/2 GMC (post-surgical psychosis?); substance induced psychosis much less likely given negative utox and no significant substance use reported. Continued collateral, observation and interview with  needed to refine ddx. Continue risperdal 1 mg HS for psychosis and continue clonazepam 0.5 mg HS for insomnia. Given improvement noticed after PRN ativan will continue to assess for catatonia and need for standing BZ to be added to regimen.  Sosa is a 21 yo M with no PPHx who presented with new onset of behavioral change including decreased sleep, increased verbal expression/talkativeness, AH, behavioral dysregulation with agitation.     On MSE pt continues to present as odd, with disorganized TP and behavior.  not available today so speech is more latent with more thought blocking although less than prior. Orientation is improved. Denying paranoia, AVH or other delusions but observed to be RIS. Dx impression is psychosis with ddx brief psychotic d/o most likely; with less likely affective psychosis vs psychosis 2/2 GMC (post-surgical psychosis?). Continued collateral, observation and interview with  needed to refine ddx. Continue risperdal and increase to 2 mg HS for psychosis and continue clonazepam 0.5 mg HS for insomnia. Given improvement noticed after PRN ativan will continue to assess for catatonia and need for standing BZ to be added to regimen-no signs today.

## 2021-03-26 NOTE — BH INPATIENT PSYCHIATRY PROGRESS NOTE - NSBHASSESSSUMMFT_PSY_ALL_CORE
19 yo male, domiciled with family, without significant PPHx, no known suicide attempts, legal, violence or substance history, with PMH/PSHx of nasal septoplasty 3/3/21, who was BIB family for concern for confusion in the context of decreased sleep and increased talkativeness.     Working diagnosis is unspecified psychosis, in light of presentation with active psychotic Sx, including +AH, odd relatedness, concrete, illogical, disorganized TP, intense labile mood, recent insomnia. Collateral from family pertinent for recent onset of mood lability, disorganized thoughts, and insomnia -- denies hx of prior psychiatric sx, Pt emigrated from [facility] in 2017. At this time, less evidence for psychosis 2/2 substance use or general medical condition (considered post-surgical psychosis, surgery >2 weeks w/o complications) vs affective psychosis-but remain on differential.     Interviewed Pt today with [facility]  -- Pt seems less thought blocked with more spontaneous speech, however still presents as psychotic with concrete disorganized TP and distractibility. Denies current AVH -- endorsed prior AH (1 day ago, people saying "hello") and prior possible VH of "horny monsters". Denies SI/HI -- unclear of prior SA and SIB but Pt denies. Insight and judgement are limited -- difficulty recalling events leading to admission. Sleep remains tenuous. Continued inpatient admission required for safety and symptom stabilization.    Plan:  1. Legal: Admitted on 9.39 emergency involuntary status  2. Safety: No reported SI/SIB/HI/VI currently on unit; continue routine observation  3. Psychiatric:   -continue Risperdal 1mg qhs for psychosis -- if tolerating will likely increase  -continue Klonopin 0.5 mg qhs for mood stabilization and anxiety  -continue melatonin for sleep  -PRN anxiety: Atarax 50mg q6hr  -PRN agitation: Ativan 2mg/ Benadryl 50mg q6 PO/IM  4. Therapy: Group & milieu therapy as tolerated  5. Medical: No acute medical needs identified. No CPAP  -3/24 lipid profile with elevated , non  -- f/u PCP  6. Collateral: obtained from father 3/23 (chart note)  7. Disposition: when stable   19 yo male, domiciled with family, without significant PPHx, no known suicide attempts, legal, violence or substance history, with PMH/PSHx of nasal septoplasty 3/3/21, who was BIB family for concern for confusion in the context of decreased sleep and increased talkativeness.     Working diagnosis is unspecified psychosis, in light of presentation with active psychotic Sx, including +AH, odd relatedness, concrete, illogical, disorganized TP, intense labile mood, recent insomnia. Collateral from family pertinent for recent onset of mood lability, disorganized thoughts, and insomnia -- denies hx of prior psychiatric sx, Pt emigrated from [facility] in 2017. At this time, less evidence for psychosis 2/2 substance use or general medical condition (considered post-surgical psychosis, surgery >2 weeks w/o complications) vs affective psychosis-but remain on differential.     Interviewed Pt today without  and Pt seems more thought blocked than yesterday. Still presents as psychotic with concrete disorganized TP and distractibility. Denies current AVH -- endorsed prior AH (people saying "hello"). Denies current SI/HI -- SI yesterday because he felt bad and considered drinking shampoo. Insight and judgement are limited. Sleep is improving. Continued inpatient admission required for safety and symptom stabilization.    Plan:  1. Legal: Admitted on 9.39 emergency involuntary status  2. Safety: No reported SI/SIB/HI/VI currently on unit; continue routine observation  3. Psychiatric:   -increase Risperdal to 2mg qhs for psychosis -- if tolerating will likely increase  -continue Klonopin 0.5 mg qhs for mood stabilization and anxiety  -continue melatonin for sleep  -PRN anxiety: Atarax 50mg q6hr  -PRN agitation: Ativan 2mg/ Benadryl 50mg q6 PO/IM  4. Therapy: Group & milieu therapy as tolerated  5. Medical: No acute medical needs identified. No CPAP  -3/24 lipid profile with elevated , non  -- f/u PCP  6. Collateral: obtained from father 3/23 (chart note)  7. Disposition: when stable   21 yo male, domiciled with family, without significant PPHx, no known suicide attempts, legal, violence or substance history, with PMH/PSHx of nasal septoplasty 3/3/21, who was BIB family for concern for confusion in the context of decreased sleep and increased talkativeness.     Working diagnosis is unspecified psychosis, in light of presentation with active psychotic Sx, including +AH, odd relatedness, concrete, illogical, disorganized TP, intense labile mood, recent insomnia. Collateral from family pertinent for recent onset of mood lability, disorganized thoughts, and insomnia -- denies hx of prior psychiatric sx. At this time, less evidence for psychosis 2/2 substance use or general medical condition (considered post-surgical psychosis, surgery >2 weeks w/o complications) vs affective psychosis-but remain on differential.     Interviewed Pt today without  (unavailable) and Pt seems more thought blocked/speech latency than yesterday. Still presents as psychotic with concrete disorganized TP and distractibility. Denies current AVH -- endorsed prior AH (people saying "hello"). Denies current SI/HI -- reported some SI yesterday because he felt bad and considered drinking shampoo but did not as he did not want to die or hurt himself. Insight and judgement are limited. Sleep is improving. Continued inpatient admission required for safety and symptom stabilization.    Plan:  1. Legal: Admitted on 9.39 emergency involuntary status  2. Safety: No reported SI/SIB/HI/VI currently on unit; continue routine observation  3. Psychiatric:   -increase Risperdal to 2mg qhs for psychosis   -continue Klonopin 0.5 mg qhs for mood stabilization and anxiety  -continue melatonin for sleep  -PRN anxiety: Atarax 50mg q6hr  -PRN agitation: Ativan 2mg/ Benadryl 50mg q6 PO/IM  4. Therapy: Group & milieu therapy as tolerated  5. Medical: No acute medical needs identified. No CPAP  -3/24 lipid profile with elevated , non  -- f/u PCP  6. Collateral: obtained from father 3/23 (chart note)  7. Disposition: when stable

## 2021-03-26 NOTE — BH INPATIENT PSYCHIATRY PROGRESS NOTE - MSE UNSTRUCTURED FT
The patient appears stated age, disheveled, and dressed in hospital gown.  The patient is calm, mostly cooperative with the interview, although distracted and requires redirection, maintained appropriate to avoidant eye contact with odd relatedness.  No psychomotor agitation, some mild retardation noted, no abnormal movements.  Steady gait observed.  The patient's speech is more spontaneous than on admission, normal in tone and normal to low volume.  The patient's mood is "sometimes happy, sometimes sad."  Affect is neutral to intense, inappropriate, not quite mood congruent.  Thought process is concrete with interview questions, disorganized with loose associations at times, illogical. Thought content notable for minimal spontaneous content, inability to provide history. Denies current delusional content.  Denies any suicidal or homicidal ideation, intent, or plan. Denies current AH/VH.  Cognition grossly intact, AAOx3 with prompting. Insight is poor.  Judgment is poor. Impulse control has been fair to poor on the unit. The patient appears stated age, disheveled, and dressed in hospital gown.  The patient is calm, mostly cooperative with the interview, although distracted and requires redirection, maintained appropriate to avoidant eye contact with odd relatedness.  No psychomotor agitation, some mild retardation noted, no abnormal movements.  Steady gait observed.  The patient's speech is normal in tone and low in volume.  The patient's mood is "tired."  Affect is neutral to intense, inappropriate, not quite mood congruent.  Thought process is concrete with interview questions, disorganized with loose associations at times, illogical. Thought content notable for minimal spontaneous content, inability to provide history. Denies current delusional content.  Denies any current suicidal or homicidal ideation, intent, or plan. However, yesterday he had SI and thought about drinking shampoo. Denies current AH/VH.  Cognition grossly intact, AAOx3 with prompting. Insight is poor.  Judgment is poor. Impulse control has been fair to poor on the unit. The patient appears stated age, disheveled, and dressed in hospital gown.  The patient is calm, mostly cooperative with the interview, although distracted and requires redirection, maintained appropriate to avoidant eye contact with odd relatedness.  No psychomotor agitation, some mild retardation noted, no abnormal movements.  Steady gait observed.  The patient's speech is normal in tone and low in volume, some more latency today than yesterday (but  not available today).  The patient's mood is "tired."  Affect is neutral to intense, inappropriate, not quite mood congruent.  Thought process is disorganized with loose associations at times, illogical. Thought content notable for minimal spontaneous content, inability to provide history. Denies current delusional content.  Denies any current suicidal or homicidal ideation, intent, or plan. Denies current AH/VH.  Cognition grossly intact, AAOx3 with prompting. Insight is poor.  Judgment is poor. Impulse control has been fair to poor on the unit.

## 2021-03-26 NOTE — BH INPATIENT PSYCHIATRY PROGRESS NOTE - NSBHFUPINTERVALHXFT_PSY_A_CORE
Chart reviewed. Case discussed with interdisciplinary team. No acute events overnight. Per staff pt appears disorganized, internally preoccupied, stares. Compliant with standing medication. No PRNs overnight. Per sleep log, pt slept from 11-7a.    Interviewed Pt in a private room. Patient had problems breathing through his nose throughout interview. When asked about events leading up to admission, at first the patient could not remember, then shortly after, says "yes, I remember... I was sleeping, then woke, then went to sleep and woke up". He admits to hitting the wall at home, yet unable to share motive or intent. He endorses alcohol use but could not specify what he drank or how much. Endorsed having thoughts about harming himself (couldn't elaborate more about detail) when he was at home, but was able to control his behavior. Pt denied trying to overdose on pills (as his family reported that he came to them with handful of pills but couldn't explain why). Today, patient was oriented to location (hospital) and year, however was confused when told date, believed it was "March 3rd" and recalls he had surgery then.    Today he feels "okay", showed team a drawing of an karo. States mood is "good, sleepy now" and is "sometimes happy, sometimes sad." He said he had headaches associated with his sadness and needed to go out and run to feel relaxed. Endorsed prior depressed mood, however denies any hopelessness and low energy. States "I like to make a car" and "I feel like I'm in love" when asked to describe his sadness. Denies current AVH -- reports hearing voices say "hello" a day ago and seeing monsters that were "horny" (unclear nature of what pt was seeing). Denies paranoia. Denies SI/HI and self harm thoughts.    Reports decreased appetite. When asked further about appetite, he mentions how people here make him tired. Sleep is fair. Pt shares he does not want to sleep, and wants to sleep less because he feels that it is better for him. Encouraged adequate hydration in light of orthostatic VS, denies dizziness. No physical complaints today.  Chart reviewed. Case discussed with interdisciplinary team. No acute events overnight. Per staff pt appears disorganized, internally preoccupied, stares. Compliant with standing medication. No PRNs overnight. Per sleep log, pt slept from 11-7a.    Interviewed Pt in a private room. Patient had problems breathing through his nose throughout interview. Endorsed having thoughts about harming himself when he was at home, stating he wanted to hit his head against the wall. Today, patient was oriented to location ([facility]), month (late march), and year. Patient knew the names of objects including a pen and a watch.    Today he feels "tired." He said that he feels lazy today like a child. Otherwise, he feels good. He believed his he was waiting for his parents to come pick him up after he called them this morning. He also made other statements at the beginning of the interview that were bizarre such as "I am the man" and "I am cheating myself." When asked to elaborate further on those statements, the pt was unable to. Patient stated that he felt bad yesterday, which made him have thoughts of dying. Patient thought about drinking shampoo because he was sad, but decided not to because he wanted to live. Patient agreed to let staff know next time he has SI. Patient denied any HI. Denies paranoia. When asked if the patient believed he was seeing signs, he said he saw a "red car," but when asked to elaborate further he just said "I like the color red, and blue." Denies current AVH -- reports hearing voices say "hello" but does not remember when and denied ever seeing monsters. Denies paranoia. Denies SI/HI and self harm thoughts. At the end of the interview, the patient asked, "is this an exam or something?" It was explained to the patient that he is here so that he can be observed on how he responds to his medications.     Reports good appetite. Sleep is fair but patient woke up tired today. Encouraged adequate hydration in light of orthostatic VS, denies dizziness. Patient complained of neck pain. No other physical complaints today.  Chart reviewed. Case discussed with interdisciplinary team. No acute events overnight. Per staff pt appears disorganized, internally preoccupied, stares. Compliant with standing medication. No PRNs overnight. Per sleep log, pt slept from 11-7a.    Interviewed Pt in a private room. Patient had problems breathing through his nose throughout interview. Endorsed having thoughts about harming himself when he was at home, stating he wanted to hit his head against the wall. Today, patient was oriented to location ([facility]), month (late march), and year. Patient knew the names of objects including a pen and a watch.    Today he feels "tired." He said that he feels lazy today like a child. Otherwise, he feels good. He believed he was waiting for his parents to come pick him up after he called them this morning. He also made other statements at the beginning of the interview that were bizarre such as "I am the man" and "I am cheating myself." When asked to elaborate further on those statements, the pt was unable to. Patient stated that he felt bad yesterday, which made him have thoughts of dying. Patient thought about drinking shampoo because he was sad, but decided not to because he wanted to live. Patient agreed to let staff know next time he has SI. Patient denied any HI. Denies paranoia. When asked if the patient believed he was seeing signs, he said he saw a "red car," but when asked to elaborate further he just said "I like the color red, and blue." Denies current AVH -- reports hearing voices say "hello" but does not remember when and denied ever seeing monsters. Denies paranoia. Denies SI/HI and self harm thoughts. At the end of the interview, the patient asked, "is this an exam or something?" It was explained to the patient that he is here so that he can be observed to see how he responds to his medications.     Reports good appetite. Sleep is fair but patient woke up tired today. Encouraged adequate hydration in light of orthostatic VS, denies dizziness. Patient complained of neck pain. No other physical complaints today.  Chart reviewed. Case discussed with interdisciplinary team. No acute events overnight. Per staff pt appears disorganized, internally preoccupied/responding to internal stimuli, stares. Compliant with standing medication. No PRNs overnight. Per sleep log, pt slept from 11-7a.    Interviewed Pt in a private room. Patient had problems breathing through his nose throughout interview, but appeared in no acute distress. Again endorsed having thoughts about harming himself when he was at home, stating he wanted to hit his head against the wall. Today, patient was oriented to location (hospital), month (late march), and year. Patient knew the names of objects including a pen and a watch.    Today he feels "tired." He said that he feels lazy today like a child. Otherwise, he feels good. He believed he was waiting for his parents to come pick him up after he called them this morning. He also made other statements at the beginning of the interview that were bizarre such as "I am the man" and "I am cheating myself." When asked to elaborate further on those statements, the pt was unable to. Patient stated that he felt bad yesterday, which made him have thoughts of dying. Patient thought about drinking shampoo because he was sad, but decided not to because he wanted to live. Patient agreed to let staff know next time he has SI and denies any SI now. Patient denied any HI. Denies paranoia. When asked if the patient believed he was seeing signs, he said he saw a "red car," but when asked to elaborate further he just said "I like the color red, and blue." Denies current AVH -- reports hearing voices say "hello" but does not remember when and denied ever seeing monsters. At the end of the interview, the patient asked, "is this an exam or something?" It was explained to the patient that he is here so that he can be observed to see how he responds to his medications.     Reports good appetite. Sleep is fair but patient woke up tired today. Encouraged adequate hydration in light of orthostatic VS, denies dizziness. Patient complained of neck pain. No other physical complaints today.

## 2021-03-27 PROCEDURE — 99232 SBSQ HOSP IP/OBS MODERATE 35: CPT

## 2021-03-27 RX ADMIN — Medication 3 MILLIGRAM(S): at 22:29

## 2021-03-27 RX ADMIN — RISPERIDONE 2 MILLIGRAM(S): 4 TABLET ORAL at 22:29

## 2021-03-27 RX ADMIN — Medication 0.5 MILLIGRAM(S): at 22:29

## 2021-03-27 NOTE — BH INPATIENT PSYCHIATRY PROGRESS NOTE - NSBHFUPINTERVALHXFT_PSY_A_CORE
Chart reviewed and case discussed with treatment team.  No events reported overnight. Sleep and appetite is "well". Patient stated that he is able to understand English and refused  services. He stated that he feels "very well" and that he is here because of depression and denies any SI. He also denies any psychosis but appears internally preoccupied and has odd affect. Patient is compliant with medications, no adverse effects reported.

## 2021-03-28 RX ADMIN — Medication 3 MILLIGRAM(S): at 20:46

## 2021-03-28 RX ADMIN — RISPERIDONE 2 MILLIGRAM(S): 4 TABLET ORAL at 20:46

## 2021-03-28 RX ADMIN — Medication 0.5 MILLIGRAM(S): at 20:46

## 2021-03-29 RX ADMIN — RISPERIDONE 2 MILLIGRAM(S): 4 TABLET ORAL at 21:19

## 2021-03-29 RX ADMIN — Medication 3 MILLIGRAM(S): at 21:19

## 2021-03-29 RX ADMIN — Medication 0.5 MILLIGRAM(S): at 21:19

## 2021-03-29 NOTE — BH INPATIENT PSYCHIATRY PROGRESS NOTE - NSBHFUPINTERVALHXFT_PSY_A_CORE
Chart reviewed. Case discussed with interdisciplinary team. Patient seen and examined for follow up of psychosis, disorganization. No acute events over the weekend - per staff pt appears disorganized, wandering but redirectable, internally preoccupied, denies AVH and SI/HI. Compliant with standing medication. No PRNs received. Per sleep log, pt slept from 11-6:30am.    Pt seen reading the New Testament in day room - says his mother is Anabaptist and father is Christian and he is both. He was calm during interview. Phone  for Malay was unavailable. Reports weekend was "good", but says he felt confused about repeating himself. States his mood is "normal and content", denies crying or feeling depressed. Endorses he had thoughts about harming himself when he was at home, but says he did not attempt suicide or engage in SIB. Denies current SI/HI. Pt is willing to let staff if he feels unsafe or has suicidal thoughts. Denies AVH currently -thinks he heard voices "in [his] mind" and endorses seeing "monsters" last week. Denies belief that someone is trying to hurt him on unit. Today, patient is oriented to location ([facility]), day (Monday), date (March 29), and year. Patient understands that he is here so that he can be observed to see how he responds to his medications.     Reports good appetite. Sleep is improving. Denies dizziness. Patient complained of chronic neck and right shoulder pain. No other physical complaints today or medication side effects. Chart reviewed. Case discussed with interdisciplinary team. Patient seen and examined for follow up of psychosis, disorganization. No acute events over the weekend - per staff pt appears disorganized, wandering but redirectable, internally preoccupied, denies AVH and SI/HI. Compliant with standing medication. No PRNs received. Per sleep log, pt slept from 11-6:30am.    Pt seen reading the New Testament in day room - says his mother is Orthodox and father is Bahai and he is both. He was calm during interview. Phone  for Sami was unavailable. Reports weekend was "good", but says he felt confused about repeating himself. States his mood is "normal and content", denies crying or feeling depressed. Endorses he had thoughts about harming himself when he was at home, but says he did not attempt suicide or engage in SIB. Denies current SI/HI. Pt is willing to let staff know if he feels unsafe or has suicidal thoughts. Denies AVH currently -thinks he heard voices "in [his] mind" and endorses seeing "monsters" last week. Denies belief that someone is trying to hurt him on unit. Today, patient is oriented to location ([facility]), day (Monday), date (March 29), and year. Patient understands that he is here so that he can be observed to see how he responds to his medications.     Reports good appetite. Sleep is improving. Denies dizziness. Patient complained of chronic neck and right shoulder pain. No other physical complaints today or medication side effects.

## 2021-03-29 NOTE — BH INPATIENT PSYCHIATRY PROGRESS NOTE - NSBHASSESSSUMMFT_PSY_ALL_CORE
21 yo male, domiciled with family, without significant PPHx, no known suicide attempts, legal, violence or substance history, with PMH/PSHx of nasal septoplasty 3/3/21, who was BIB family for concern for confusion in the context of decreased sleep and increased talkativeness.     Working diagnosis is unspecified psychosis, in light of presentation with active psychotic Sx, including +AH, odd relatedness, concrete, illogical, disorganized TP, intense labile mood, recent insomnia. Collateral from family pertinent for recent onset of mood lability, disorganized thoughts, and insomnia -- denies hx of prior psychiatric sx. At this time, less evidence for psychosis 2/2 substance use or general medical condition (considered post-surgical psychosis, surgery >2 weeks w/o complications) vs affective psychosis-but remain on differential.     Interviewed Pt today without  (unavailable) and Pt with some more thought blocked/speech latency than yesterday. Still presents as psychotic with concrete disorganized TP and distractibility. Denies current AVH -- endorsed prior AH (people saying "hello") and VH of monsters last week. Denies current SI/HI. Insight and judgement are limited. Sleep is improving. Continued inpatient admission required for safety and symptom stabilization.    Plan:  1. Legal: Admitted on 9.39 emergency involuntary status  2. Safety: No reported SI/SIB/HI/VI currently on unit; continue routine observation  3. Psychiatric:   -continue Risperdal to 2mg qhs for psychosis   -continue Klonopin 0.5 mg qhs for mood stabilization and anxiety  -continue melatonin for sleep  -PRN anxiety: Atarax 50mg q6hr  -PRN agitation: Ativan 2mg/ Benadryl 50mg q6 PO/IM  4. Therapy: Group & milieu therapy as tolerated  5. Medical: No acute medical needs identified. No CPAP  -3/24 lipid profile with elevated , non  -- f/u PCP  6. Collateral: obtained from father 3/23 (chart note)  7. Disposition: when stable

## 2021-03-29 NOTE — BH INPATIENT PSYCHIATRY PROGRESS NOTE - MSE UNSTRUCTURED FT
The patient appears stated age, disheveled, and dressed in hospital gown.  The patient is calm and mostly cooperative with the interview, although distracted and requires redirection, maintained appropriate to avoidant eye contact with odd relatedness.  Some mild psychomotor retardation noted, no psychomotor agitation or abnormal movements.  Steady gait observed.  The patient's speech is normal in tone and low in volume, some more latency today than yesterday (but  not available today).  The patient's mood is "normal, content."  Affect is neutral to intense, inappropriate, not quite mood congruent.  Thought process is concrete, disorganized with loose associations at times, illogical. Thought content notable for minimal spontaneous content, inability to provide history. Denies current delusional content.  Denies any current suicidal or homicidal ideation, intent, or plan. Denies current AH/VH.  Cognition grossly intact, AAOx3. Insight is poor.  Judgment is poor. Impulse control has been fair to poor on the unit. The patient appears stated age, disheveled, and dressed in hospital gown.  The patient is calm and mostly cooperative with the interview, although distracted and requires redirection, maintained appropriate to avoidant eye contact with odd relatedness.  Some mild psychomotor retardation noted, no psychomotor agitation or abnormal movements.  Steady gait observed.  The patient's speech is normal in tone and low in volume, some more latency today than prior (but  not available today).  The patient's mood is "normal, content."  Affect is neutral to intense, inappropriate, not quite mood congruent.  Thought process is concrete, disorganized with loose associations at times, illogical. Thought content notable for minimal spontaneous content, inability to provide history. Denies current delusional content.  Denies any current suicidal or homicidal ideation, intent, or plan. Denies current AH/VH.  Cognition grossly intact, AAOx3. Insight is poor.  Judgment is poor. Impulse control has been fair to poor on the unit.

## 2021-03-29 NOTE — BH INPATIENT PSYCHIATRY PROGRESS NOTE - CASE SUMMARY
Sosa is a 21 yo M with no PPHx who presented with new onset of behavioral change including decreased sleep, increased verbal expression/talkativeness, AH, behavioral dysregulation with agitation.     On MSE pt continues to present as odd, with disorganized TP and behavior improving from initial admission. Continues to intermittently endorse hallucinations. Orientation is much improved. Dx impression is psychosis with ddx brief psychotic d/o most likely; with less likely affective psychosis vs psychosis 2/2 [facility] (post-surgical psychosis?); substance induced psychosis much less likely given negative utox and no significant substance use reported. Continued observation and interview with  needed to refine ddx. Continue risperdal 2 mg HS for psychosis and continue clonazepam 0.5 mg HS for insomnia. Given improvement noticed after PRN ativan will continue to assess for catatonia and need for standing BZ to be added to regimen-less likely to be clear component of catatonia given continued presentation of symptoms.

## 2021-03-30 PROCEDURE — 99232 SBSQ HOSP IP/OBS MODERATE 35: CPT | Mod: 25

## 2021-03-30 RX ADMIN — Medication 3 MILLIGRAM(S): at 22:18

## 2021-03-30 RX ADMIN — Medication 0.5 MILLIGRAM(S): at 22:18

## 2021-03-30 RX ADMIN — RISPERIDONE 2 MILLIGRAM(S): 4 TABLET ORAL at 22:18

## 2021-03-30 NOTE — BH INPATIENT PSYCHIATRY PROGRESS NOTE - NSBHFUPINTERVALHXFT_PSY_A_CORE
Chart reviewed. Case discussed with interdisciplinary team. Patient seen and examined for follow up of psychosis, disorganization. No acute events overnight - per staff pt appears disorganized, wandering but easily redirectable, visible. Compliant with standing medication. No PRNs received. Per sleep log, slept after 11p.    Interviewed with Gabriela phone  (Claudia ID [id number]). Pt reports feeling "much better mentally" and, with prompting, in between long pauses, Pt agrees that his mood and sleep have improved, his thoughts are clearer. At first he felt "crazy" on arrival, but could not elaborate further - Pt still does not remember events leading to hospitalization, thinks he felt scared, only recalls getting blood drawn. He last recalls watching TV at home, then was in an ambulance. Denies current AH, believes he heard voices 4 days ago. Denies current VH, last saw "devil" on day of admission. Denies current paranoia, says he felt paranoid before yesterday, but could not elaborate. 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 Denies current SI/HI, denies NSSIB. Pt had suicidal thoughts 2 days ago when he was sad thinking of parents but decided not to act on plan of drinking body lotion at the hospital; prior to admission, Pt also had SI with plan to head bang on wall. He is motivated to stay safe on unit and approach staff if he feels overwhelmed. He understands that he is here so that he can be observed to see how he responds to his medications.     Reports good appetite. Sleep is improving, stays asleep through night. Patient complained of chronic upper back pain. No other physical complaints today or medication side effects. Updated father with Gabriela  on phone, answered questions.  Chart reviewed. Case discussed with interdisciplinary team. Patient seen and examined for follow up of psychosis, disorganization. No acute events overnight - per staff pt appears disorganized, wandering but easily redirectable, visible. Compliant with standing medication. No PRNs received. Per sleep log, slept after 11p.    Interviewed with Gabriela phone  (Claudia ID [id number]). Pt reports feeling "much better mentally" and, with prompting, in between long pauses, Pt agrees that his mood and sleep have improved, his thoughts are clearer. At first he felt "crazy" on arrival, but could not elaborate further - Pt still does not remember events leading to hospitalization, thinks he felt scared, only recalls getting blood drawn. He last recalls watching TV at home, then was in an ambulance. Denies current AH, believes he heard voices 4 days ago. Denies current VH, last saw "devil" on day of admission. Denies current paranoia, says he felt paranoid before yesterday, but could not elaborate. 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 Denies current SI/HI, denies NSSIB. Pt had suicidal thoughts 2 days ago when he was sad thinking of parents but decided not to act on plan of drinking body lotion at the hospital; prior to admission, Pt also had SI with plan to head bang on wall. He is motivated to stay safe on unit and approach staff if he feels overwhelmed. He understands that he is here so that he can be observed to see how he responds to his medications.     Reports good appetite. Sleep is improving, stays asleep through night. Patient complained of chronic upper back pain. No other physical complaints today or medication side effects.     Updated father with Gabriela  on phone, answered questions.

## 2021-03-30 NOTE — BH INPATIENT PSYCHIATRY PROGRESS NOTE - MSE UNSTRUCTURED FT
The patient appears stated age, fair hygiene, and dressed in hospital gown.  The patient is calm and mostly cooperative with the interview, less distracted than previous, maintained appropriate to avoidant eye contact with odd relatedness.  Some mild psychomotor retardation noted, no psychomotor agitation or abnormal movements.  Steady gait observed.  The patient's speech is normal in tone and low in volume, more latency today than yesterday (with phone ).  The patient's mood is "much better."  Affect is neutral to anxious, inappropriate, not quite mood congruent.  Thought process is concrete, at times disorganized with loose associations and illogical. Thought content notable for minimal spontaneous content, inability to provide history. Denies current delusional content.  Denies any current suicidal or homicidal ideation, intent, or plan. Denies current AH/VH.  Cognition grossly intact, AAOx3. Insight is poor.  Judgment is poor. Impulse control has been fair to poor on the unit. The patient appears stated age, fair hygiene, and dressed in hospital gown.  The patient is calm and mostly cooperative with the interview, less distracted than previous, maintained appropriate to avoidant eye contact with odd relatedness.  Some mild psychomotor retardation noted, no psychomotor agitation or abnormal movements.  Steady gait observed.  The patient's speech is normal in tone and low in volume, less latency today than yesterday (with phone ).  The patient's mood is "much better."  Affect is neutral to anxious, inappropriate, not quite mood congruent.  Thought process is concrete, at times disorganized with loose associations and illogical. Thought content notable for minimal spontaneous content, inability to provide history. Denies current delusional content.  Denies any current suicidal or homicidal ideation, intent, or plan. Denies current AH/VH.  Cognition grossly intact, AAOx3. Insight is poor.  Judgment is poor. Impulse control has been fair to poor on the unit. The patient appears stated age, fair hygiene, and dressed in hospital gown.  The patient is calm and mostly cooperative with the interview, less distracted than previous, maintained appropriate to avoidant eye contact with odd relatedness.  Some mild psychomotor retardation noted, no psychomotor agitation or abnormal movements.  Steady gait observed.  The patient's speech is normal in tone and low in volume, less latency today than yesterday (with phone ).  The patient's mood is "much better."  Affect is neutral to anxious, not quite mood congruent.  Thought process is concrete, at times disorganized with loose associations and illogical, but less so. Thought content notable for minimal spontaneous content, inability to provide history. Denies current delusional content.  Denies any current suicidal or homicidal ideation, intent, or plan. Denies current AH/VH.  Cognition grossly intact, AAOx3. Insight is poor.  Judgment is poor. Impulse control has been fair to poor on the unit.

## 2021-03-30 NOTE — BH SCALES AND SCREENS - NSBPRSHALLBEH_PSY_ALL_CORE
3 = Mild – hallucinations, but significant, infrequent, or transient (e.g., occasional formless visual hallucinations, a voice calling the patient’s name)

## 2021-03-30 NOTE — BH INPATIENT PSYCHIATRY PROGRESS NOTE - NSBHASSESSSUMMFT_PSY_ALL_CORE
21 yo male, domiciled with family, without significant PPHx, no known suicide attempts, legal, violence or substance history, with PMH/PSHx of nasal septoplasty 3/3/21, who was BIB family for concern for confusion in the context of decreased sleep and increased talkativeness.     Working diagnosis is unspecified psychosis, in light of presentation with active psychotic Sx, including +AH, odd relatedness, concrete, illogical, disorganized TP, intense labile mood, recent insomnia. Collateral from family pertinent for recent onset of mood lability, disorganized thoughts, and insomnia -- denies hx of prior psychiatric sx. At this time, less evidence for psychosis 2/2 substance use or general medical condition (considered post-surgical psychosis, surgery >2 weeks w/o complications) vs affective psychosis-but remain on differential.     Interviewed Pt today with Polish , Pt seemed more thought blocked/speech latency than yesterday when discussing symptoms prior to admission. Still presents as psychotic with concrete disorganized TP and distractibility. Denies current AVH -- endorsed prior AH last week and VH of monsters/devil on admission. Denies current SI/HI. Last endorsed active SI two days ago with plan to drink body lotion at hospital, however Pt decided not to act on impulse. Insight and judgement remain limited. Sleep is improving. Continued inpatient admission required for safety and symptom stabilization.    Plan:  1. Legal: Admitted on 9.39 emergency involuntary status  2. Safety: No reported SI/SIB/HI/VI currently on unit; continue routine observation  3. Psychiatric:   -continue Risperdal to 2mg qhs for psychosis   -continue Klonopin 0.5 mg qhs for mood stabilization and anxiety  -continue melatonin for sleep  -PRN anxiety: Atarax 50mg q6hr  -PRN agitation: Ativan 2mg/ Benadryl 50mg q6 PO/IM  4. Therapy: Group & milieu therapy as tolerated  5. Medical: No acute medical needs identified. No CPAP  -3/24 lipid profile with elevated , non  -- f/u PCP  6. Collateral: obtained from father 3/23 (chart note)  7. Disposition: when stable   19 yo male, domiciled with family, without significant PPHx, no known suicide attempts, legal, violence or substance history, with PMH/PSHx of nasal septoplasty 3/3/21, who was BIB family for concern for confusion in the context of decreased sleep and increased talkativeness.     Working diagnosis is unspecified psychosis, in light of presentation with active psychotic Sx, including +AH, odd relatedness, concrete, illogical, disorganized TP, intense labile mood, recent insomnia. Collateral from family pertinent for recent onset of mood lability, disorganized thoughts, and insomnia -- denies hx of prior psychiatric sx. At this time, less evidence for psychosis 2/2 substance use or general medical condition (considered post-surgical psychosis, surgery >2 weeks w/o complications) vs affective psychosis-but remain on differential.     Interviewed Pt today with Divehi , Pt seemed less thought blocked/speech latency than yesterday, however has difficulty expressing mood Sx and recalling symptoms prior to admission. Appears less psychotic, presents with less concrete disorganized TP and distractibility. Denies current AVH -- endorsed prior AH last week and VH of monsters/devil on admission. Denies current SI/HI. Last endorsed active SI two days ago with plan to drink body lotion at hospital, however Pt decided not to act on impulse. Insight and judgement remain limited. Sleep is improving. Continued inpatient admission required for safety and symptom stabilization.    Plan:  1. Legal: Admitted on 9.39 emergency involuntary status  2. Safety: No reported SI/SIB/HI/VI currently on unit; continue routine observation  3. Psychiatric:   -continue Risperdal to 2mg qhs for psychosis   -continue Klonopin 0.5 mg qhs for mood stabilization and anxiety  -continue melatonin for sleep  -PRN anxiety: Atarax 50mg q6hr  -PRN agitation: Ativan 2mg/ Benadryl 50mg q6 PO/IM  4. Therapy: Group & milieu therapy as tolerated  5. Medical: No acute medical needs identified. No CPAP  -3/24 lipid profile with elevated , non  -- f/u PCP  6. Collateral: obtained from father 3/23 (chart note)  7. Disposition: when stable   21 yo male, domiciled with family, without significant PPHx, no known suicide attempts, legal, violence or substance history, with PMH/PSHx of nasal septoplasty 3/3/21, who was BIB family for concern for confusion in the context of decreased sleep and increased talkativeness.     Working diagnosis is unspecified psychosis, in light of presentation with active psychotic Sx, including +AH, odd relatedness, concrete, illogical, disorganized TP, intense labile mood, recent insomnia. Collateral from family pertinent for recent onset of mood lability, disorganized thoughts, and insomnia -- denies hx of prior psychiatric sx. At this time, less evidence for psychosis 2/2 substance use or general medical condition (considered post-surgical psychosis, surgery >2 weeks w/o complications) vs affective psychosis-but remain on differential.     Interviewed Pt today with Kiswahili , Pt seemed less thought blocked/with decreased speech latency than yesterday, however has difficulty expressing mood Sx and recalling symptoms prior to admission. Appears less psychotic, presents with improved organization of TP and decreased distractibility. Denies current AVH -- endorsed prior AH last week and VH of monsters/devil on admission. Denies current SI/HI. Last endorsed active SI two days ago with plan to drink body lotion at hospital, however Pt decided not to act on impulse. Insight and judgement remain limited. Sleep is improving. Continued inpatient admission required for safety and symptom stabilization.    Plan:  1. Legal: Admitted on 9.39 emergency involuntary status  2. Safety: No reported SI/SIB/HI/VI currently on unit; continue routine observation  3. Psychiatric:   -continue Risperdal to 2mg qhs for psychosis   -continue Klonopin 0.5 mg qhs for mood stabilization and anxiety  -continue melatonin for sleep  -PRN anxiety: Atarax 50mg q6hr  -PRN agitation: Ativan 2mg/ Benadryl 50mg q6 PO/IM  4. Therapy: Group & milieu therapy as tolerated  5. Medical: No acute medical needs identified. No CPAP  -3/24 lipid profile with elevated , non  -- f/u PCP  6. Collateral: obtained from father 3/23 (chart note); provided updates via phone today  7. Disposition: when stable

## 2021-03-30 NOTE — BH INPATIENT PSYCHIATRY PROGRESS NOTE - CASE SUMMARY
Sosa is a 21 yo M with no PPHx who presented with new onset of behavioral change including decreased sleep, increased verbal expression/talkativeness, AH, behavioral dysregulation with agitation.     On MSE pt continues to present as odd. TP is improving, as it is increasingly organized-however does continue with illogical answers to questions at times and limited ability to describe current and past symptoms. Per staff and on MSE behavior is also improving from initial admission. Dx impression is psychosis with ddx brief psychotic d/o most likely; with less likely affective psychosis vs psychosis 2/2 [facility] (post-surgical psychosis?); substance induced psychosis much less likely given negative utox and no significant substance use reported. Continued observation and interview with  needed to refine ddx. Continue risperdal 2 mg HS for psychosis and continue clonazepam 0.5 mg HS for insomnia. May need to increase dose of risperdal pending response over next few days.

## 2021-03-31 PROCEDURE — 99232 SBSQ HOSP IP/OBS MODERATE 35: CPT | Mod: 25

## 2021-03-31 PROCEDURE — 90853 GROUP PSYCHOTHERAPY: CPT

## 2021-03-31 RX ORDER — CLONAZEPAM 1 MG
0.5 TABLET ORAL AT BEDTIME
Refills: 0 | Status: DISCONTINUED | OUTPATIENT
Start: 2021-03-31 | End: 2021-04-07

## 2021-03-31 RX ADMIN — RISPERIDONE 2 MILLIGRAM(S): 4 TABLET ORAL at 21:34

## 2021-03-31 RX ADMIN — Medication 3 MILLIGRAM(S): at 21:33

## 2021-03-31 RX ADMIN — Medication 0.5 MILLIGRAM(S): at 21:33

## 2021-03-31 NOTE — BH INPATIENT PSYCHIATRY PROGRESS NOTE - NSBHASSESSSUMMFT_PSY_ALL_CORE
19 yo male, domiciled with family, without significant PPHx, no known suicide attempts, legal, violence or substance history, with PMH/PSHx of nasal septoplasty 3/3/21, who was BIB family for concern for confusion in the context of decreased sleep and increased talkativeness.     Working diagnosis is unspecified psychosis, in light of presentation with active psychotic Sx, including +AH, odd relatedness, concrete, illogical, disorganized TP, intense labile mood, recent insomnia. DDx brief psychotic episode vs SZ/SZA. Collateral from family pertinent for recent onset of mood lability, disorganized thoughts, and insomnia -- denies hx of prior psychiatric sx. At this time, less evidence for psychosis 2/2 substance use or general medical condition (considered post-surgical psychosis, surgery >2 weeks w/o complications) vs affective psychosis-but remain on differential.     Today [facility]  was not available by phone -- Pt appears less thought blocked/with decreased speech latency than yesterday. He had difficulty expressing mood Sx and recalling symptoms prior to admission. Appears less psychotic, presents with improved organization of TP and decreased distractibility. Denies current AVH -- endorsed prior AH last week and VH of monsters/devil on admission. Denies current SI/HI. Last endorsed active SI on 3/28 with plan to drink body lotion at hospital, however Pt decided not to act on impulse. Insight and judgement are limited but improving -- Pt is more receptive to psychoeducation, however will require an  to accurately assess Pt's understanding. Sleep is improving. Continued inpatient admission required for safety and symptom stabilization.    Plan:  1. Legal: Admitted on 9.39 emergency involuntary status  2. Safety: No reported SI/SIB/HI/VI currently on unit; continue routine observation  3. Psychiatric:   -continue Risperdal to 2mg qhs for psychosis   -continue Klonopin 0.5 mg qhs for mood stabilization and anxiety  -continue melatonin for sleep  -PRN anxiety: Atarax 50mg q6hr  -PRN agitation: Ativan 2mg/ Benadryl 50mg q6 PO/IM  4. Therapy: Group & milieu therapy as tolerated  5. Medical: No acute medical needs identified. No CPAP  -3/24 lipid profile with elevated , non  -- f/u PCP  6. Collateral: obtained from father 3/23 (chart note); provided updates via phone today  7. Disposition: when stable   19 yo male, domiciled with family, without significant PPHx, no known suicide attempts, legal, violence or substance history, with PMH/PSHx of nasal septoplasty 3/3/21, who was BIB family for concern for confusion in the context of decreased sleep and increased talkativeness.     Working diagnosis is unspecified psychosis, in light of presentation with active psychotic Sx, including +AH, odd relatedness, concrete, illogical, disorganized TP, intense labile mood, recent insomnia. DDx brief psychotic episode vs SZ/SZA. Collateral from family pertinent for recent onset of mood lability, disorganized thoughts, and insomnia -- denies hx of prior psychiatric sx. At this time, less evidence for psychosis 2/2 substance use or general medical condition (considered post-surgical psychosis, surgery >2 weeks w/o complications) vs affective psychosis-but remain on differential.     Today [facility]  was not available by phone -- Pt appears less thought blocked/with decreased speech latency than yesterday. He had difficulty expressing mood Sx and recalling symptoms prior to admission. Appears less psychotic, presents with improved organization of TP and decreased distractibility. Denies current AVH. Denies current SI/HI. Insight and judgement are limited but improving -- Pt is more receptive to psychoeducation, however will require an  to accurately assess Pt's understanding. Sleep is improving. Continued inpatient admission required for safety and symptom stabilization.    Plan:  1. Legal: Admitted on 9.39 emergency involuntary status  2. Safety: No reported SI/SIB/HI/VI currently on unit; continue routine observation  3. Psychiatric:   -continue Risperdal 2mg qhs for psychosis   -continue Klonopin 0.5 mg qhs for mood stabilization and anxiety  -continue melatonin for sleep  -PRN anxiety: Atarax 50mg q6hr  -PRN agitation: Ativan 2mg/ Benadryl 50mg q6 PO/IM  4. Therapy: Group & milieu therapy as tolerated  5. Medical: No acute medical needs identified. No CPAP  -3/24 lipid profile with elevated , non  -- f/u PCP  6. Collateral: obtained from father 3/23 (chart note); provided updates via phone 3/30  7. Disposition: when stable

## 2021-03-31 NOTE — BH INPATIENT PSYCHIATRY PROGRESS NOTE - MSE UNSTRUCTURED FT
The patient appears stated age, fair hygiene, and dressed in hospital gown.  The patient is calm and cooperative with the interview, less distracted than previous, maintained appropriate eye contact with appropriate relatedness.  Some mild psychomotor retardation noted, no psychomotor agitation or abnormal movements.  Steady gait observed.  The patient's speech is normal in tone and low in volume, less latency today (without phone ).  The patient's mood is "fine."  Affect is neutral, reactive, mood congruent.  Thought process is concrete, less disorganized and more logical. Thought content notable for minimal spontaneous content, motivation for treatment. Denies current delusional content.  Denies any current suicidal or homicidal ideation, intent, or plan. Denies current AH/VH.  Cognition grossly intact, AAOx3. Insight is poor, improving.  Judgment is fair. Impulse control has been fair on the unit.

## 2021-03-31 NOTE — BH INPATIENT PSYCHIATRY PROGRESS NOTE - CASE SUMMARY
Sosa is a 21 yo M with no PPHx who presented with new onset of behavioral change including decreased sleep, increased verbal expression/talkativeness, AH, behavioral dysregulation with agitation.     On MSE pt presents with improving TP and increasingly organized behavior and logical responses to questions. Denying amos psychotic or mood symptoms. Dx impression is psychosis with ddx brief psychotic d/o most likely; with less likely affective psychosis vs psychosis 2/2 [facility] (post-surgical psychosis?)- Continued observation overtime (and [facility] outpatient) needed to refine ddx. Continue risperdal 2 mg HS for psychosis and continue clonazepam 0.5 mg HS for insomnia. May need to increase dose of risperdal pending response over next few days-continue to hold and monitor for now.

## 2021-03-31 NOTE — BH INPATIENT PSYCHIATRY PROGRESS NOTE - NSBHFUPINTERVALHXFT_PSY_A_CORE
Chart reviewed. Case discussed with interdisciplinary team. Patient seen and examined for follow up of psychosis, disorganization. No acute events overnight - per staff pt is cooperative, calm, visible. Compliant with standing medication. No PRNs received. Per sleep log, slept after 11p.     for Lithuanian was unavailable by phone today. Pt is calm, with a recent haircut. Reports feeling "fine", smiling appropriately. Reports he has good energy and has not felt sad. Has been participating in recreational dance groups and feels he is socializing more with others. States his goal is to enjoy his experience at the hospital. Denies current AH/VH. Denies SI/HI, denies any self harm urges. He is motivated to stay safe on unit and approach staff if he feels overwhelmed. Pt received psychoeducation and understands the importance of continuing outpatient treatment after discharge, but was limited when Pt was asked to explain back.     Reports good appetite. Sleep is improving, slept after 10:30pm and stays asleep through night. Patient complained of chronic upper back pain. No other physical complaints today or medication side effects.

## 2021-04-01 PROCEDURE — 99232 SBSQ HOSP IP/OBS MODERATE 35: CPT

## 2021-04-01 RX ADMIN — Medication 3 MILLIGRAM(S): at 22:17

## 2021-04-01 RX ADMIN — Medication 0.5 MILLIGRAM(S): at 22:17

## 2021-04-01 RX ADMIN — RISPERIDONE 2 MILLIGRAM(S): 4 TABLET ORAL at 22:17

## 2021-04-01 NOTE — BH TREATMENT PLAN - NSTXDCOPLKINTERSW_PSY_ALL_CORE
Writer will continue to support the patient and encourage patient to engage in conversation regarding aftercare.
Mayra will work with patient to connect to treatment

## 2021-04-01 NOTE — BH TREATMENT PLAN - NSTXPSYCHOINTERPR_PSY_ALL_CORE
Pt has demonstrated minimal progress towards psychiatric rehabilitation goals over the past week. Psychiatric Rehabilitation staff will continue to meet with pt individually to provide support, encouragement, and counseling so that they will continue identifying and utilizing effective coping skills for better symptom management over seven days.
Patient may benefit from attending symptom management groups and meeting with psychiatric rehabilitation staff individually in order to identify 1 effective coping skill to manage symptoms related to psychosis over seven days.

## 2021-04-01 NOTE — BH TREATMENT PLAN - NSTXPLANTHERAPYSESSIONSFT_PSY_ALL_CORE
03-29-21  Type of therapy: Dialectical behavior therapy,Coping skills,Creative arts therapy,Mindfulness,Music therapy,Peer advocate,Stress management,Symptom management  Type of session: Individual  Level of patient participation: Participated with encouragement,Quiet  Duration of participation: 15 minutes  Therapy conducted by: Psych rehab  Therapy Summary: Pt has demonstrated minimal improvement towards goals over the past week. Pt is currently unable to identify any healthy coping strategies to better manage symptoms at this time. Pt continues to present as disorganized and is observed pacing on unit. Pt presents with a brighter affect and demonstrates some improvement in mood. Pt denies any current AH/VH or paranoia, however appears internally preoccupied at times. Pt denies any current suicidal ideation, intent, or plan at this time. Pt has been compliant with medications and is tolerating them well. Over the past week, pt has been somewhat receptive to skill development. Pt has attended approximately 50% of daily psychiatric rehabilitation groups over the past seven days. Pt is quiet and minimally participates in groups despite encouragement. Pt has been visible on the unit, however demonstrates minimal socialization with peers. Pt has not required any redirection and has not been a behavioral management issue on the unit over the past week. Writer encouraged pt to continue engaging in treatment in order to better develop coping skills and for continued support.

## 2021-04-01 NOTE — BH TREATMENT PLAN - NSCMSPTSTRENGTHS_PSY_ALL_CORE
Compliance to treatment/Supportive family
Compliance to treatment/Physically healthy/Supportive family

## 2021-04-01 NOTE — BH INPATIENT PSYCHIATRY PROGRESS NOTE - CASE SUMMARY
Sosa is a 21 yo M with no PPHx who presented with new onset of behavioral change including decreased sleep, increased verbal expression/talkativeness, AH, behavioral dysregulation with agitation.     On MSE pt presents with improving TP and increasingly organized behavior and logical responses to questions. Denying amos psychotic or mood symptoms. Dx impression is psychosis with ddx brief psychotic d/o most likely; with less likely affective psychosis vs psychosis 2/2 [facility] (post-surgical psychosis?)- Continued observation overtime (and likely outpatient) needed to refine ddx. Continue risperdal 2 mg HS for psychosis and continue clonazepam 0.5 mg HS for insomnia. May need to increase dose of risperdal pending response over next few days-continue to hold and monitor for now. If gains maintained can likely begin discharge planning for next week.

## 2021-04-01 NOTE — BH INPATIENT PSYCHIATRY PROGRESS NOTE - MSE UNSTRUCTURED FT
The patient appears stated age, fair hygiene, and dressed in hospital gown.  The patient is calm and cooperative with the interview, focused, maintained appropriate eye contact with appropriate relatedness.  No psychomotor retardation, psychomotor agitation or abnormal movements.  Steady gait observed.  The patient's speech is normal in tone and low in volume, less latency today (without phone ).  The patient's mood is "fine, content."  Affect is brighter, reactive, mood congruent.  Thought process is concrete, less disorganized and more logical. Thought content notable for minimal spontaneous content, motivation for treatment. Denies current delusional content.  Denies any current suicidal or homicidal ideation, intent, or plan. Denies current AH/VH.  Cognition grossly intact, AAOx3. Insight is poor to fair, improving.  Judgment is fair. Impulse control has been fair on the unit.

## 2021-04-01 NOTE — BH TREATMENT PLAN - NSTXPSYCHOGOAL_PSY_ALL_CORE
Will engage in a 15 minute conversation with no irrational content
Will verbalize 1 strategy to successfully cope with psychotic symptoms

## 2021-04-01 NOTE — BH TREATMENT PLAN - NSTXPSYCHOINTERRN_PSY_ALL_CORE
Encourage pt to report perceptual difficulties to staff.
Monitor mood and behavior. Assess for hallucinations.

## 2021-04-01 NOTE — BH INPATIENT PSYCHIATRY PROGRESS NOTE - NSBHASSESSSUMMFT_PSY_ALL_CORE
21 yo male, domiciled with family, without significant PPHx, no known suicide attempts, legal, violence or substance history, with PMH/PSHx of nasal septoplasty 3/3/21, who was BIB family for concern for confusion in the context of decreased sleep and increased talkativeness.     Working diagnosis is unspecified psychosis, in light of presentation with active psychotic Sx, including +AH, odd relatedness, concrete, illogical, disorganized TP, intense labile mood, recent insomnia. DDx brief psychotic episode vs SZ/SZA. Collateral from family pertinent for recent onset of mood lability, disorganized thoughts, and insomnia -- denies hx of prior psychiatric sx. At this time, less evidence for psychosis 2/2 substance use or general medical condition (considered post-surgical psychosis, surgery >2 weeks w/o complications) vs affective psychosis-but remain on differential.     Today Indonesian  was not available by phone and Pt insisted on conducting interview in English -- Pt appears less thought blocked/with decreased speech latency - however he has difficulty expressing mood Sx and recalling symptoms prior to admission. TP seems concrete but language barrier likely contributing to Pt's limited responses. Appears less psychotic, presents with improved organization of TP, decreased distractibility, appropriate eye contact. Denies current AVH -- endorsed prior AH last week and VH of monsters/devil on admission. Denies current SI/HI. Last endorsed active SI on 3/28 with plan to drink body lotion at hospital, however Pt decided not to act on impulse, denies any self harm urges. Sleep is improving. Insight and judgement are limited but improving -- Pt is more receptive to psychoeducation, however will require an  to accurately assess Pt's understanding. Continued inpatient admission required for safety and symptom stabilization.    Plan:  1. Legal: Admitted on 9.39 emergency involuntary status  2. Safety: No reported SI/SIB/HI/VI currently on unit; continue routine observation  3. Psychiatric:   -continue Risperdal to 2mg qhs for psychosis   -continue Klonopin 0.5 mg qhs for mood stabilization and anxiety  -continue melatonin for sleep  -PRN anxiety: Atarax 50mg q6hr  -PRN agitation: Ativan 2mg/ Benadryl 50mg q6 PO/IM  4. Therapy: Group & milieu therapy as tolerated  5. Medical: No acute medical needs identified. No CPAP  -3/24 lipid profile with elevated , non  -- f/u PCP  6. Collateral: obtained from father 3/23 (chart note); provided updates via phone today  7. Disposition: when stable  -psychoeducation, safety planning with pt and family - need Indonesian   19 yo male, domiciled with family, without significant PPHx, no known suicide attempts, legal, violence or substance history, with PMH/PSHx of nasal septoplasty 3/3/21, who was BIB family for concern for confusion in the context of decreased sleep and increased talkativeness.     Working diagnosis is unspecified psychosis, in light of presentation with active psychotic Sx, including +AH, odd relatedness, concrete, illogical, disorganized TP, intense labile mood, recent insomnia. DDx brief psychotic episode vs SZ/SZA. Collateral from family pertinent for recent onset of mood lability, disorganized thoughts, and insomnia -- denies hx of prior psychiatric sx. At this time, less evidence for psychosis 2/2 substance use or general medical condition (considered post-surgical psychosis, surgery >2 weeks w/o complications) vs affective psychosis-but remain on differential.     Today Ukrainian  was not available by phone and Pt insisted on conducting interview in English -- Pt appears less thought blocked/with decreased speech latency - however continues with difficulty describing experiences and symptoms. TP seems concrete but language barrier likely contributing to Pt's limited responses. Appears less psychotic, presents with improved organization of TP, decreased distractibility, appropriate eye contact. Denies current AVH, SI/HI. Sleep is improving. Insight and judgement are limited but improving -- Pt is more receptive to psychoeducation, however will require an  to accurately assess Pt's understanding. Continued inpatient admission required for safety and symptom stabilization.    Plan:  1. Legal: Admitted on 9.39 emergency involuntary status  2. Safety: No reported SI/SIB/HI/VI currently on unit; continue routine observation  3. Psychiatric:   -continue Risperdal 2mg qhs for psychosis   -continue Klonopin 0.5 mg qhs for mood stabilization and anxiety  -continue melatonin for sleep  -PRN anxiety: Atarax 50mg q6hr  -PRN agitation: Ativan 2mg/ Benadryl 50mg q6 PO/IM  4. Therapy: Group & milieu therapy as tolerated  5. Medical: No acute medical needs identified. No CPAP  -3/24 lipid profile with elevated , non  -- f/u PCP  6. Collateral: obtained from father 3/23 (chart note); provided updates via phone 3/30  7. Disposition: when stable  -psychoeducation, safety planning with pt and family - need Ukrainian

## 2021-04-01 NOTE — BH INPATIENT PSYCHIATRY PROGRESS NOTE - NSBHFUPINTERVALHXFT_PSY_A_CORE
Chart reviewed. Case discussed with interdisciplinary team. Patient seen and examined for follow up of psychosis, disorganization. No acute events overnight - per staff pt is social, less disorganized, still oddly related at times. Compliant with standing medication. No PRNs received. Per sleep log, slept after 11p.     for Azeri was unavailable by phone today, and Pt insists on not using . Pt is pleasant, reports feeling "fine, content", seen smiling appropriately. Reports he has not felt sad or nervous. He enjoys socializing with others and playing [employer]. Denies current AH/VH. Denies SI/HI, denies any self harm urges. He is motivated to stay safe on unit and approach staff if he feels overwhelmed. Pt received psychoeducation and understands that he will continue to monitored on medications, but was limited when Pt was asked to explain back.     Reports good appetite. Sleep has improved, slept after 10pm and stays asleep through night. Patient reports chronic upper back pain. No other physical complaints today or medication side effects.  Chart reviewed. Case discussed with interdisciplinary team. Patient seen and examined for follow up of psychosis, disorganization. No acute events overnight - per staff pt is social, less disorganized, still oddly related at times. Compliant with standing medication. No PRNs received. Per sleep log, slept after 11p.     for Kyrgyz was unavailable by phone today, and Pt insists on not using . Pt is pleasant, reports feeling "fine, content", seen smiling appropriately. Reports he has not felt sad or nervous. He enjoys socializing with others and playing [place]. Denies current AH/VH, paranoia or delusions. Denies SI/HI, denies any self harm urges. He is motivated to stay safe on unit and approach staff if he feels overwhelmed. Pt received psychoeducation and understands that he will continue to monitored on medications, but was limited when Pt was asked to explain back.     Reports good appetite. Sleep has improved, slept after 10pm and stays asleep through night. Patient reports chronic upper back pain. No other physical complaints today or medication side effects.

## 2021-04-01 NOTE — BH TREATMENT PLAN - NSTXCAREGIVERPARTICIPATE_PSY_P_CORE
Family/Caregiver participated in identification of needs/problems/goals for treatment/Family/Caregiver participated in defining interventions/Family/Caregiver participated in development of after care plan
Family/Caregiver participated in identification of needs/problems/goals for treatment/Family/Caregiver participated in defining interventions/Family/Caregiver participated in development of after care plan

## 2021-04-01 NOTE — BH TREATMENT PLAN - NSTXPSYCHOINTERMD_PSY_ALL_CORE
continue Risperdal to target psychotic Sx, will offer CAR at discharge
continue Risperdal titration to target psychotic Sx, will offer CAR at discharge

## 2021-04-01 NOTE — BH TREATMENT PLAN - NSTXDCOPLKINTERMD_PSY_ALL_CORE
will connect pt to outpatient psychiatrist and provide handoff after discharge
will connect pt to outpatient psychiatrist and provide handoff after discharge

## 2021-04-02 PROCEDURE — 90853 GROUP PSYCHOTHERAPY: CPT

## 2021-04-02 RX ADMIN — Medication 0.5 MILLIGRAM(S): at 20:51

## 2021-04-02 RX ADMIN — RISPERIDONE 2 MILLIGRAM(S): 4 TABLET ORAL at 20:51

## 2021-04-02 RX ADMIN — Medication 3 MILLIGRAM(S): at 20:51

## 2021-04-02 NOTE — BH INPATIENT PSYCHIATRY PROGRESS NOTE - NSBHCHARTREVIEWINVESTIGATE_PSY_A_CORE FT
3/21: CT Head and Maxillofacial w IV contrast - unremarkable 

## 2021-04-02 NOTE — BH INPATIENT PSYCHIATRY PROGRESS NOTE - NSBHFUPINTERVALHXFT_PSY_A_CORE
Chart reviewed. Case discussed with interdisciplinary team. Patient seen and examined for follow up of psychosis, disorganization. No acute events overnight. Compliant with standing medication. No PRNs received. Per sleep log, slept after 11p.    Interviewed with  for Thai by phone today (ID KVNG on [employer] phone maria eugenia). Pt is smiling appropriately, reports feeling "happy". Denies current AH/VH, paranoia or delusions. Denies SI/HI, denies any self harm urges. When asked why he previously wanted to self harm by drinking lotion, Pt says this is "difficult to answer" and that he felt hopeless, alone at hospital, but has "lost this hopeless feeling" with treatment. He is motivated to stay safe on unit and approach staff if he feels overwhelmed. Pt believes that medications help him feel good and improves his "self-experience" - now feels that "life is acceptable". Pt received psychoeducation and was able to express his understanding on the importance of medication adherence after discharge as well as the risks of symptoms recurring if he stops medications without his outpatient provider's guidance. He also understands that he will continue to monitored through the weekend and is motivated for effective discharge planning possibly for next week if he sustains his gains.     Reports good appetite. Sleep has improved. Patient reports chronic upper back pain. No other physical complaints today or medication side effects.  Chart reviewed. Case discussed with interdisciplinary team. Patient seen and examined for follow up of psychosis, disorganization. No acute events overnight. Compliant with standing medication. No PRNs received. Per sleep log, slept after 11p.    Interviewed with  for Setswana by phone today (ID KVNG on [employer] phone maria eugenia). Pt is smiling appropriately, reports feeling "happy". Denies current AH/VH, paranoia or delusions. Denies SI/HI, denies any self harm urges. When asked why he previously wanted to self harm by drinking lotion, Pt says this is "difficult to answer" and that he felt hopeless, alone at hospital, but has "lost this hopeless feeling" with treatment. He is motivated to stay safe on unit and approach staff if he feels overwhelmed. Pt believes that medications help him feel good and improves his "self-experience" - now feels that "life is acceptable". Pt received psychoeducation re: psychotic sxs present on admission and was able to express his understanding on the importance of medication adherence after discharge as well as the risks of symptoms recurring if he stops medications without his outpatient provider's guidance. He also understands that he will continue to monitored through the weekend and is motivated for effective discharge planning possibly for next week if he sustains his gains.     Reports good appetite. Sleep has improved. Patient reports chronic upper back pain. No other physical complaints today or medication side effects.

## 2021-04-02 NOTE — BH INPATIENT PSYCHIATRY PROGRESS NOTE - NSBHASSESSSUMMFT_PSY_ALL_CORE
19 yo male, domiciled with family, without significant PPHx, no known suicide attempts, legal, violence or substance history, with PMH/PSHx of nasal septoplasty 3/3/21, who was BIB family for concern for confusion in the context of decreased sleep and increased talkativeness.     Working diagnosis is unspecified psychosis, in light of presentation with active psychotic Sx, including +AH, odd relatedness, concrete, illogical, disorganized TP, intense labile mood, recent insomnia. DDx brief psychotic episode vs SZ/SZA. Collateral from family pertinent for recent onset of mood lability, disorganized thoughts, and insomnia -- denies hx of prior psychiatric sx. At this time, less evidence for psychosis 2/2 substance use or general medical condition (considered post-surgical psychosis, surgery >2 weeks w/o complications) vs affective psychosis-but remain on differential.     Today interviewed with [facility]  by phone -- Pt is sustaining gains with reduction in amos psychotic symptoms. Presents with improved organization of TP, decreased distractibility, appropriate eye contact. He appears less thought blocked/with decreased speech latency - however continues with minimal spontaneous content, difficulty describing experiences and symptoms. TP seems concrete but language barrier likely contributing to Pt's limited responses. Denies current AVH, SI/HI. Sleep is improving. Insight and judgement are improving -- Pt is receptive to psychoeducation and able to explain back the importance of medication adherence in outpatient care [facility] . Continued inpatient admission required for safety and symptom stabilization, and safe discharge planning.    Plan:  1. Legal: Admitted on 9.39 emergency involuntary status  2. Safety: No reported SI/SIB/HI/VI currently on unit; continue routine observation  3. Psychiatric:   -continue Risperdal 2mg qhs for psychosis   -continue Klonopin 0.5 mg qhs for mood stabilization and anxiety  -continue melatonin for sleep  -PRN anxiety: Atarax 50mg q6hr  -PRN agitation: Ativan 2mg/ Benadryl 50mg q6 PO/IM  4. Therapy: Group & milieu therapy as tolerated  5. Medical: No acute medical needs identified. No CPAP  -3/24 lipid profile with elevated , non  -- f/u PCP  6. Collateral: obtained from father 3/23 (chart note); provided updates via phone 3/30  7. Disposition: when stable  -psychoeducation, safety planning with pt and family - need Gabriela   21 yo male, domiciled with family, without significant PPHx, no known suicide attempts, legal, violence or substance history, with PMH/PSHx of nasal septoplasty 3/3/21, who was BIB family for concern for confusion in the context of decreased sleep and increased talkativeness.     Working diagnosis is unspecified psychosis, in light of presentation with active psychotic Sx, including +AH, odd relatedness, concrete, illogical, disorganized TP, intense labile mood, recent insomnia. DDx brief psychotic episode vs SZ/SZA. Collateral from family pertinent for recent onset of mood lability, disorganized thoughts, and insomnia -- denies hx of prior psychiatric sx. At this time, less evidence for psychosis 2/2 substance use or general medical condition (considered post-surgical psychosis, surgery >2 weeks w/o complications) vs affective psychosis-but remain on differential.     [facility] -- Pt is sustaining gains with reduction in amos psychotic symptoms. Presents with improved organization of TP, decreased distractibility, appropriate eye contact. He appears less thought blocked/with decreased speech latency - however continues with minimal spontaneous content, difficulty describing experiences and symptoms. TP seems concrete but language barrier likely contributing to Pt's limited responses. Denies current AVH, SI/HI. Sleep is improving. Insight and judgement are improving -- Pt is receptive to psychoeducation and able to explain back the importance of medication adherence in outpatient care when using [facility] . Continued inpatient admission required for safety and symptom stabilization, and safe discharge planning.    Plan:  1. Legal: Admitted on 9.39 emergency involuntary status  2. Safety: No reported SI/SIB/HI/VI currently on unit; continue routine observation  3. Psychiatric:   -continue Risperdal 2mg qhs for psychosis   -continue Klonopin 0.5 mg qhs for mood stabilization, sleep and anxiety  -continue melatonin for sleep  -PRN anxiety: Atarax 50mg q6hr  -PRN agitation: Ativan 2mg/ Benadryl 50mg q6 PO/IM  4. Therapy: Group & milieu therapy as tolerated  5. Medical: No acute medical needs identified. No CPAP  -3/24 lipid profile with elevated , non  -- f/u PCP  6. Collateral: obtained from father 3/23 (chart note); provided updates via phone 3/30  7. Disposition: when stable  -psychoeducation, safety planning with pt and family - need Gabriela

## 2021-04-02 NOTE — BH INPATIENT PSYCHIATRY PROGRESS NOTE - NSBHCHARTREVIEWLAB_PSY_A_CORE FT
3/21: unremarkable CBC Diff, CMP, Mg, Phos, TSH, neg serum tox, neg Blood Cx x2  3/22: neg UTox, unremarkable UA  3/24: lipid profile with elevated , non , wnl A1C 5.3

## 2021-04-02 NOTE — BH INPATIENT PSYCHIATRY PROGRESS NOTE - MSE UNSTRUCTURED FT
The patient appears stated age, fair hygiene, and dressed in hospital gown.  The patient is calm and cooperative with the interview, focused, maintained appropriate eye contact with appropriate relatedness.  No psychomotor retardation, psychomotor agitation or abnormal movements.  Steady gait observed.  The patient's speech is normal in tone and low in volume, less latency (with phone ).  The patient's mood is "happy."  Affect is brighter, reactive, mood congruent.  Thought process is concrete, less disorganized and more logical. Thought content notable for minimal spontaneous content, motivation for treatment. Denies current delusional content.  Denies any current suicidal or homicidal ideation, intent, or plan. Denies current AH/VH.  Cognition grossly intact, AAOx3. Insight is fair, improving.  Judgment is fair. Impulse control has been fair on the unit. The patient appears stated age, fair hygiene, and dressed in hospital gown.  The patient is calm and cooperative with the interview, focused, maintained appropriate eye contact with appropriate relatedness.  No psychomotor retardation, psychomotor agitation or abnormal movements.  Steady gait observed.  The patient's speech is normal in tone and low in volume, less latency (with phone ).  The patient's mood is "happy."  Affect is brighter, reactive, mood congruent.  Thought process is concrete, more logical. Thought content notable for minimal spontaneous content but largely appropriate answers to questions, motivation for treatment. Denies current delusional content.  Denies any current suicidal or homicidal ideation, intent, or plan. Denies current AH/VH.  Cognition grossly intact, AAOx3. Insight is fair, improving.  Judgment is fair. Impulse control has been fair on the unit.

## 2021-04-02 NOTE — BH INPATIENT PSYCHIATRY PROGRESS NOTE - CASE SUMMARY
Sosa is a 21 yo M with no PPHx who presented with new onset of behavioral change including decreased sleep, increased verbal expression/talkativeness, AH, behavioral dysregulation with agitation.     On MSE pt presents with improving TP and increasingly organized behavior and logical responses to questions. Denying amos psychotic or mood symptoms. Better able to understand psychoeducation re: sxs at presentation and need for treatment. Dx impression is psychosis with ddx brief psychotic d/o most likely; with less likely affective psychosis vs psychosis 2/2 [facility] (post-surgical psychosis?)- Continued observation overtime (and likely outpatient) needed to refine ddx. Continue risperdal 2 mg HS for psychosis and continue clonazepam 0.5 mg HS for insomnia. May need to increase dose of risperdal pending response over next few days-continue to hold and monitor for now. If gains maintained can likely begin discharge planning for next week.

## 2021-04-02 NOTE — BH INPATIENT PSYCHIATRY PROGRESS NOTE - MODIFICATIONS
I have modified the resident's note, my edits/additions can be seen in finalized document above. 

## 2021-04-02 NOTE — BH INPATIENT PSYCHIATRY PROGRESS NOTE - DETAILS
chronic right upper back pain
chronic posterior neck and right shoulder pain
see above
chronic right upper back pain
chronic right upper back pain

## 2021-04-03 PROCEDURE — 99232 SBSQ HOSP IP/OBS MODERATE 35: CPT

## 2021-04-03 RX ADMIN — RISPERIDONE 2 MILLIGRAM(S): 4 TABLET ORAL at 21:17

## 2021-04-03 RX ADMIN — Medication 3 MILLIGRAM(S): at 21:16

## 2021-04-03 RX ADMIN — Medication 0.5 MILLIGRAM(S): at 21:16

## 2021-04-04 RX ADMIN — Medication 0.5 MILLIGRAM(S): at 21:20

## 2021-04-04 RX ADMIN — RISPERIDONE 2 MILLIGRAM(S): 4 TABLET ORAL at 21:20

## 2021-04-04 RX ADMIN — Medication 3 MILLIGRAM(S): at 21:20

## 2021-04-05 PROCEDURE — 99232 SBSQ HOSP IP/OBS MODERATE 35: CPT

## 2021-04-05 RX ADMIN — Medication 3 MILLIGRAM(S): at 21:46

## 2021-04-05 RX ADMIN — Medication 325 MILLIGRAM(S): at 12:02

## 2021-04-05 RX ADMIN — RISPERIDONE 2 MILLIGRAM(S): 4 TABLET ORAL at 21:46

## 2021-04-05 RX ADMIN — Medication 0.5 MILLIGRAM(S): at 21:46

## 2021-04-05 NOTE — BH INPATIENT PSYCHIATRY PROGRESS NOTE - NSBHFUPINTERVALHXFT_PSY_A_CORE
Chart reviewed and case discussed with treatment team.  No events reported over the weekend. Spoke with patient via [employer]  ID #[id number]. Sleep and appetite is fair. Patient reported previously seeing a dark shadow and hearing voices that he believes was due to playing video games. He denies any current AVH but still appears internally preoccupied. He is guarded and paranoia also suspected. He is more out on the unit but reported that he feels others are bullying him. He was unable to clearly explain how they were bullying him. Patient is compliant with medications, no adverse effects reported.

## 2021-04-05 NOTE — BH INPATIENT PSYCHIATRY PROGRESS NOTE - NSBHASSESSSUMMFT_PSY_ALL_CORE
Patient remains psychotic but is showing some improvement.    c/w current medication  Convert to 9.27 from 9.39

## 2021-04-06 PROCEDURE — 99232 SBSQ HOSP IP/OBS MODERATE 35: CPT

## 2021-04-06 RX ADMIN — Medication 3 MILLIGRAM(S): at 21:33

## 2021-04-06 RX ADMIN — Medication 325 MILLIGRAM(S): at 09:06

## 2021-04-06 RX ADMIN — Medication 325 MILLIGRAM(S): at 18:51

## 2021-04-06 RX ADMIN — Medication 325 MILLIGRAM(S): at 19:00

## 2021-04-06 RX ADMIN — Medication 0.5 MILLIGRAM(S): at 21:33

## 2021-04-06 RX ADMIN — RISPERIDONE 2 MILLIGRAM(S): 4 TABLET ORAL at 21:34

## 2021-04-06 NOTE — BH INPATIENT PSYCHIATRY PROGRESS NOTE - NSICDXBHSECONDARYDX_PSY_ALL_CORE
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  

## 2021-04-06 NOTE — BH PSYCHOLOGY - GROUP THERAPY NOTE - NSPSYCHOLGRPDBTINT_PSY_A_CORE FT
taught mindfulness skill  
taught emotion regulation skill    
taught emotion regulation skill  
taught mindfulness skill

## 2021-04-06 NOTE — BH INPATIENT PSYCHIATRY PROGRESS NOTE - NSBHASSESSSUMMFT_PSY_ALL_CORE
21 yo male, domiciled, no prior PPHx, no SA/NSSIB, no PMH, who presents with new onset of behavioral change including decreased sleep, increased verbal expression/talkativeness, AH, behavioral dysregulation with agitation  Psychosis attenuating, patient is attending groups, engaging in milieu    c/w current medications  tentative d/c thurs

## 2021-04-06 NOTE — BH INPATIENT PSYCHIATRY PROGRESS NOTE - NSBHFUPINTERVALHXFT_PSY_A_CORE
Care discussed with treatment team. Refused  phone, stated that he will be able to communicate in english. Patient reported feeling "fine", initially reported to writer that he feels better and that he was here for depression. When reminded patient of the psychotic sx that he was admitted with, patient then stated that he was no longer hearing voices. Patient reported that he feels better on the meds and he is hopeful about d/c. Patient reported sleeping well and with good appetite.

## 2021-04-07 PROCEDURE — 90853 GROUP PSYCHOTHERAPY: CPT

## 2021-04-07 PROCEDURE — 99232 SBSQ HOSP IP/OBS MODERATE 35: CPT | Mod: 25

## 2021-04-07 RX ORDER — CLONAZEPAM 1 MG
1 TABLET ORAL
Qty: 14 | Refills: 0
Start: 2021-04-07 | End: 2021-04-20

## 2021-04-07 RX ORDER — CLONAZEPAM 1 MG
0.5 TABLET ORAL AT BEDTIME
Refills: 0 | Status: DISCONTINUED | OUTPATIENT
Start: 2021-04-07 | End: 2021-04-08

## 2021-04-07 RX ORDER — RISPERIDONE 4 MG/1
1 TABLET ORAL
Qty: 14 | Refills: 0
Start: 2021-04-07 | End: 2021-04-20

## 2021-04-07 RX ORDER — LANOLIN ALCOHOL/MO/W.PET/CERES
1 CREAM (GRAM) TOPICAL
Qty: 14 | Refills: 0
Start: 2021-04-07 | End: 2021-04-20

## 2021-04-07 RX ADMIN — Medication 3 MILLIGRAM(S): at 22:17

## 2021-04-07 RX ADMIN — Medication 0.5 MILLIGRAM(S): at 22:17

## 2021-04-07 RX ADMIN — RISPERIDONE 2 MILLIGRAM(S): 4 TABLET ORAL at 22:17

## 2021-04-07 NOTE — BH PSYCHOLOGY - GROUP THERAPY NOTE - NSPSYCHOLGRPBILLING_PSY_A_CORE
[id number] - Group Psychotherapy
[id number] - Group Psychotherapy
[id number] - Group Psychotherapy
[id number] - Group Psychotherapy
[id number] - Group Psychotherapy
[id number] - Group Psychotherapy

## 2021-04-07 NOTE — BH PSYCHOLOGY - GROUP THERAPY NOTE - NSPSYCHOLGRPDBTPT_PSY_A_CORE FT
DBT skills generalization group is a group in which patients review the skill taught the day before, and patients have the opportunity to troubleshoot the skill, engage in more practice, and share their experience using the skill. Today’s skills review group focused on the skill of Accumulating Positive Emotions in the Long Term by identifying values and translating those into goals and manageable action steps. Patients shared values that are important to them and how these translate into goals, as well as how they’ve begun to implement these.  
DBT skills generalization group is a group in which patients review the skill taught the day before, and patients have the opportunity to troubleshoot the skill, engage in more practice, and share their experience using the skill. Today’s skills review group focused on the skills of “check the facts,” and “opposite action.” “Check the facts” is about being more realistic about interpretations and assumptions and challenging them appropriately to think more rationally, and “opposite action” involves acting opposite to problematic urges that come with emotions in order to change negative emotions.  encouraged patients to share examples and leader as well as fellow participants provided helpful feedback and support.  
DBT Group is a group in which patients learn skills to better manage their emotions and behaviors. Group begins with a mindfulness practice so that patients have an opportunity to practice observing their internal and external experiences.  Following the mindfulness exercise the group learns new skills in a didactic format. Group today focused on the “emotion regulation” module.  Specifically, patients learned the skill of Accumulating Positive Emotions in the Long Term by identifying values and translating those into goals and manageable action steps. Patients shared values that are important to them and how these translate into goals, as well as how they’ve begun to implement these.
DBT Group is a group in which patients learn skills to better manage their emotions and behaviors. Group begins with a mindfulness practice so that patients have an opportunity to practice observing their internal and external experiences.  Following the mindfulness exercise the group learns new skills in a didactic format. Group today focused on the “emotion regulation” module.  Specifically, patients learned Build Mastery and [place] and [place] A[place]. Patients were asked to identify an activity to engage in every day that would help increase their sense of competence and accomplishment (Build Mastery). They were also asked to identify potential difficult situations, identify skills to help manage these difficulties, and imagine coping effectively ([place]).
DBT Group is a group in which patients learn skills to better manage their emotions and behaviors. Group today focused on the “mindfulness” module, which are skills to help patients increase their awareness of their present experience without judgment. Pts were taught the “what” skills (observe, describe, participate) and “how” skills (non-judgmentally, effectively, and one-mindfully) of mindfulness, and engaged in a variety of mindfulness exercises to practice the skills, and shared observations at the end of each activity. 
DBT Group is a group in which patients learn skills to better manage their emotions and behaviors. Group begins with a mindfulness practice so that patients have an opportunity to practice observing their internal and external experiences.  Following the mindfulness exercise the group learns new skills in a didactic format. Pts were taught the concept of “wise mind,” which is about identifying different states of mind (emotional vs. reasonable mind) and finding ways to tap into wise mind which is a blend of the two, and the state of mind that is consistent with wise decision making and long term goals and values.

## 2021-04-07 NOTE — BH PSYCHOLOGY - GROUP THERAPY NOTE - NSPSYCHOLGRPDBTGOAL_PSY_A_CORE
reduce mood and affective lability/improve ability to indentify feelings
reduce mood and affective lability/improve ability to indentify feelings/improve ability to communicate feelings/reduce vulnerability to emotional dysregualation
reduce mood and affective lability/reduce impulsive self-defeating behavior/improve ability to indentify feelings/reduce vulnerability to emotional dysregualation
reduce mood and affective lability/improve ability to indentify feelings
reduce mood and affective lability/improve ability to indentify feelings

## 2021-04-07 NOTE — BH PSYCHOLOGY - GROUP THERAPY NOTE - NSPSYCHOLGRPDBTEMOT_PSY_A_CORE FT
na
opposite action
build mastery and cope ahead
Accumulating Positives: Long Term  
Build Mastery / [place] Ahead  
accumulating positive emotions in the long-term
na

## 2021-04-07 NOTE — BH PSYCHOLOGY - GROUP THERAPY NOTE - NSBHPSYCHOLASSESSPROV_PSY_A_CORE
Trainee Only
Licensed Staff Psychologist and Trainee
Licensed Staff Psychologist
Licensed Staff Psychologist and Trainee

## 2021-04-07 NOTE — BH INPATIENT PSYCHIATRY PROGRESS NOTE - NSBHMSESPEECH_PSY_A_CORE
Normal volume, rate, productivity, spontaneity and articulation
Abnormal as indicated, otherwise normal...

## 2021-04-07 NOTE — BH PSYCHOLOGY - GROUP THERAPY NOTE - NSBHPTASSESSDT_PSY_A_CORE
23-Mar-2021 11:15
24-Mar-2021 09:15
06-Apr-2021 11:00
07-Apr-2021 09:15
26-Mar-2021 11:15
31-Mar-2021 09:15
02-Apr-2021 11:15

## 2021-04-07 NOTE — BH PSYCHOLOGY - GROUP THERAPY NOTE - NSPSYCHOLGRPDBTPROB_PSY_A_CORE
psychotic episodes

## 2021-04-07 NOTE — BH INPATIENT PSYCHIATRY PROGRESS NOTE - NSBHASSESSSUMMFT_PSY_ALL_CORE
19 yo male, domiciled, no prior PPHx, no SA/NSSIB, no PMH, who presents with new onset of behavioral change including decreased sleep, increased verbal expression/talkativeness, AH, behavioral dysregulation with agitation  Psychosis attenuating, patient is attending groups, engaging in milieu    c/w current medications-risperdal 2mg hs, melatonin 3mg hs, klonopin 0.5m ghs  tentative d/c thurs

## 2021-04-07 NOTE — BH PSYCHOLOGY - GROUP THERAPY NOTE - NSBHPSYCHOLPARTICIP_PSY_A_CORE
Partially engaged
Fully engaged
Fully engaged
Partially engaged

## 2021-04-07 NOTE — BH INPATIENT PSYCHIATRY PROGRESS NOTE - NSBHFUPINTERVALHXFT_PSY_A_CORE
Care discussed with treatment team. Patient reported feeling "good, fine", motivated and looking forward to d/c tomorrow. Patient denied SE from meds. Patient refused  phone, stated he can speak in english. Patient answering appropriately. Patient observed in the milieu, pleasant, taking meds, attending groups.

## 2021-04-07 NOTE — BH PSYCHOLOGY - GROUP THERAPY NOTE - NSBHPSYCHOLRESPONSE_PSY_A_CORE
Accepted support
Accepted support
Coping skills acquired/Accepted support
Coping skills acquired/Accepted support
Accepted support

## 2021-04-07 NOTE — BH PSYCHOLOGY - GROUP THERAPY NOTE - NSPSYCHOLGRPDBTPT_PSY_A_CORE
Patient unable to participate due to clinical symptoms/other...
stable mood and affect in group/Patient unable to identify mood states/Patient unable to participate due to clinical symptoms/other...
Patient unable to participate due to clinical symptoms/other...
Patient unable to participate due to clinical symptoms/other...
stable mood and affect in group/Patient unable to identify mood states
Patient unable to participate due to clinical symptoms/other...
stable mood and affect in group/other...

## 2021-04-07 NOTE — BH PSYCHOLOGY - GROUP THERAPY NOTE - NSBHPSYCHOLGRPTYPE_PSY_A_CORE
DBT Life Skills

## 2021-04-07 NOTE — BH PSYCHOLOGY - GROUP THERAPY NOTE - NSPSYCHOLGRPDBTINT_PSY_A_CORE
review emotion regulation homework
other...
review emotion regulation homework
review emotion regulation homework
other...

## 2021-04-08 VITALS — DIASTOLIC BLOOD PRESSURE: 62 MMHG | SYSTOLIC BLOOD PRESSURE: 115 MMHG | TEMPERATURE: 98 F | HEART RATE: 97 BPM

## 2021-04-08 PROBLEM — Z78.9 OTHER SPECIFIED HEALTH STATUS: Chronic | Status: ACTIVE | Noted: 2021-03-23

## 2021-04-08 PROCEDURE — 99238 HOSP IP/OBS DSCHRG MGMT 30/<: CPT

## 2021-04-08 NOTE — BH INPATIENT PSYCHIATRY PROGRESS NOTE - NSBHINPTBILLING_PSY_ALL_CORE
77552 - Inpatient Moderate Complexity
68941 - Inpatient Moderate Complexity
56903 - Inpatient Moderate Complexity
47837 - Inpatient Moderate Complexity
68913 - Inpatient Moderate Complexity
59429 - Hospital Discharge Day Management; 30 min or less
70895 - Inpatient Moderate Complexity
95859 - Inpatient Moderate Complexity
25333 - Inpatient Moderate Complexity
28865 - Inpatient Moderate Complexity
03017 - Inpatient Moderate Complexity
82999 - Inpatient Moderate Complexity
58677 - Inpatient Moderate Complexity

## 2021-04-08 NOTE — BH INPATIENT PSYCHIATRY DISCHARGE NOTE - NSDCCPCAREPLAN_GEN_ALL_CORE_FT
Detail Level: Detailed
Depth Of Biopsy: dermis
Was A Bandage Applied: Yes
Size Of Lesion In Cm: 0.8
Biopsy Type: H and E
Biopsy Method: Dermablade
Anesthesia Type: 2% lidocaine with epinephrine
Anesthesia Volume In Cc (Will Not Render If 0): 1
Additional Anesthesia Volume In Cc (Will Not Render If 0): 0
Hemostasis: Drysol
Wound Care: Bacitracin
Dressing: bandage
Destruction After The Procedure: No
Type Of Destruction Used: Curettage
Curettage Text: The wound bed was treated with curettage after the biopsy was performed.
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       
Cryotherapy Text: The wound bed was treated with cryotherapy after the biopsy was performed.
Electrodesiccation Text: The wound bed was treated with electrodesiccation after the biopsy was performed.
Electrodesiccation And Curettage Text: The wound bed was treated with electrodesiccation and curettage after the biopsy was performed.
Silver Nitrate Text: The wound bed was treated with silver nitrate after the biopsy was performed.
Lab: [id number]
Lab Facility: [id number]
Consent: Verbal consent was obtained and risks were reviewed including but not limited to scarring, infection, bleeding, scabbing, incomplete removal, nerve damage and allergy to anesthesia.
Post-Care Instructions: I reviewed with the patient in detail post-care instructions. Patient is to keep the biopsy site covered and then apply Vaseline twice daily until healed. Patient may wash gently with soap and water to remove crusting.
Notification Instructions: Patient will be notified of biopsy results. However, patient instructed to call the office if not contacted within 2 weeks.
Billing Type: Third-Party Bill
Information: Selecting Yes will display possible errors in your note based on the variables you have selected. This validation is only offered as a suggestion for you. PLEASE NOTE THAT THE VALIDATION TEXT WILL BE REMOVED WHEN YOU FINALIZE YOUR NOTE. IF YOU WANT TO FAX A PRELIMINARY NOTE YOU WILL NEED TO TOGGLE THIS TO 'NO' IF YOU DO NOT WANT IT IN YOUR FAXED NOTE.

## 2021-04-08 NOTE — BH DISCHARGE NOTE NURSING/SOCIAL WORK/PSYCH REHAB - NSDCPRRECOMMEND_PSY_ALL_CORE
Psychiatric Rehabilitation staff recommends that patient beings outpatient treatment at [facility] for ongoing medication management, support, and psychotherapy. In addition to, continuing exploring and utilizing healthy coping skills for improved symptom management and sustained recovery.

## 2021-04-08 NOTE — BH INPATIENT PSYCHIATRY PROGRESS NOTE - NSBHMETABOLIC_PSY_ALL_CORE_FT
BMI:   HbA1c: A1C with Estimated Average Glucose Result: 5.3 % (03-24-21 @ 09:25)    Glucose: POCT Blood Glucose.: 105 mg/dL (03-21-21 @ 14:05)    BP: 136/96 (03-29-21 @ 07:35) (99/57 - 136/96)  Lipid Panel: Date/Time: 03-24-21 @ 09:25  Cholesterol, Serum: 202  Direct LDL: --  HDL Cholesterol, Serum: 50  Total Cholesterol/HDL Ration Measurement: --  Triglycerides, Serum: 60  
BMI:   HbA1c: A1C with Estimated Average Glucose Result: 5.3 % (03-24-21 @ 09:25)    Glucose: POCT Blood Glucose.: 105 mg/dL (03-21-21 @ 14:05)    BP: 115/62 (04-08-21 @ 07:57) (93/60 - 127/75)  Lipid Panel: Date/Time: 03-24-21 @ 09:25  Cholesterol, Serum: 202  Direct LDL: --  HDL Cholesterol, Serum: 50  Total Cholesterol/HDL Ration Measurement: --  Triglycerides, Serum: 60  
BMI:   HbA1c:   Glucose: POCT Blood Glucose.: 105 mg/dL (03-21-21 @ 14:05)    BP: 129/96 (03-22-21 @ 05:35) (129/96 - 172/99)  Lipid Panel: 
BMI:   HbA1c: A1C with Estimated Average Glucose Result: 5.3 % (03-24-21 @ 09:25)    Glucose: POCT Blood Glucose.: 105 mg/dL (03-21-21 @ 14:05)    BP: 111/65 (03-31-21 @ 08:17) (111/65 - 136/96)  Lipid Panel: Date/Time: 03-24-21 @ 09:25  Cholesterol, Serum: 202  Direct LDL: --  HDL Cholesterol, Serum: 50  Total Cholesterol/HDL Ration Measurement: --  Triglycerides, Serum: 60  
BMI:   HbA1c: A1C with Estimated Average Glucose Result: 5.3 % (03-24-21 @ 09:25)    Glucose: POCT Blood Glucose.: 105 mg/dL (03-21-21 @ 14:05)    BP: 93/60 (04-07-21 @ 08:00) (93/60 - 127/75)  Lipid Panel: Date/Time: 03-24-21 @ 09:25  Cholesterol, Serum: 202  Direct LDL: --  HDL Cholesterol, Serum: 50  Total Cholesterol/HDL Ration Measurement: --  Triglycerides, Serum: 60  
BMI:   HbA1c: A1C with Estimated Average Glucose Result: 5.3 % (03-24-21 @ 09:25)    Glucose: POCT Blood Glucose.: 105 mg/dL (03-21-21 @ 14:05)    BP: 110/64 (04-02-21 @ 06:25) (110/64 - 116/69)  Lipid Panel: Date/Time: 03-24-21 @ 09:25  Cholesterol, Serum: 202  Direct LDL: --  HDL Cholesterol, Serum: 50  Total Cholesterol/HDL Ration Measurement: --  Triglycerides, Serum: 60  
BMI:   HbA1c: A1C with Estimated Average Glucose Result: 5.3 % (03-24-21 @ 09:25)    Glucose: POCT Blood Glucose.: 105 mg/dL (03-21-21 @ 14:05)    BP: 116/69 (04-01-21 @ 08:04) (111/65 - 117/83)  Lipid Panel: Date/Time: 03-24-21 @ 09:25  Cholesterol, Serum: 202  Direct LDL: --  HDL Cholesterol, Serum: 50  Total Cholesterol/HDL Ration Measurement: --  Triglycerides, Serum: 60  
BMI:   HbA1c: A1C with Estimated Average Glucose Result: 5.3 % (03-24-21 @ 09:25)    Glucose: POCT Blood Glucose.: 105 mg/dL (03-21-21 @ 14:05)    BP: 127/75 (04-06-21 @ 07:33) (125/59 - 127/75)  Lipid Panel: Date/Time: 03-24-21 @ 09:25  Cholesterol, Serum: 202  Direct LDL: --  HDL Cholesterol, Serum: 50  Total Cholesterol/HDL Ration Measurement: --  Triglycerides, Serum: 60  
BMI:   HbA1c: A1C with Estimated Average Glucose Result: 5.3 % (03-24-21 @ 09:25)    Glucose: POCT Blood Glucose.: 105 mg/dL (03-21-21 @ 14:05)    BP: 125/59 (04-05-21 @ 06:27) (112/72 - 125/59)  Lipid Panel: Date/Time: 03-24-21 @ 09:25  Cholesterol, Serum: 202  Direct LDL: --  HDL Cholesterol, Serum: 50  Total Cholesterol/HDL Ration Measurement: --  Triglycerides, Serum: 60  
BMI:   HbA1c: A1C with Estimated Average Glucose Result: 5.3 % (03-24-21 @ 09:25)    Glucose: POCT Blood Glucose.: 105 mg/dL (03-21-21 @ 14:05)    BP: 134/93 (03-24-21 @ 08:25) (129/96 - 172/99)  Lipid Panel: Date/Time: 03-24-21 @ 09:25  Cholesterol, Serum: 202  Direct LDL: --  HDL Cholesterol, Serum: 50  Total Cholesterol/HDL Ration Measurement: --  Triglycerides, Serum: 60  
BMI:   HbA1c: A1C with Estimated Average Glucose Result: 5.3 % (03-24-21 @ 09:25)    Glucose: POCT Blood Glucose.: 105 mg/dL (03-21-21 @ 14:05)    BP: 117/83 (03-30-21 @ 07:56) (115/71 - 136/96)  Lipid Panel: Date/Time: 03-24-21 @ 09:25  Cholesterol, Serum: 202  Direct LDL: --  HDL Cholesterol, Serum: 50  Total Cholesterol/HDL Ration Measurement: --  Triglycerides, Serum: 60  
BMI:   HbA1c: A1C with Estimated Average Glucose Result: 5.3 % (03-24-21 @ 09:25)    Glucose: POCT Blood Glucose.: 105 mg/dL (03-21-21 @ 14:05)    BP: 124/90 (03-25-21 @ 06:16) (124/90 - 134/93)  Lipid Panel: Date/Time: 03-24-21 @ 09:25  Cholesterol, Serum: 202  Direct LDL: --  HDL Cholesterol, Serum: 50  Total Cholesterol/HDL Ration Measurement: --  Triglycerides, Serum: 60  
BMI:   HbA1c: A1C with Estimated Average Glucose Result: 5.3 % (03-24-21 @ 09:25)    Glucose: POCT Blood Glucose.: 105 mg/dL (03-21-21 @ 14:05)    BP: 118/77 (03-26-21 @ 08:21) (118/77 - 134/93)  Lipid Panel: Date/Time: 03-24-21 @ 09:25  Cholesterol, Serum: 202  Direct LDL: --  HDL Cholesterol, Serum: 50  Total Cholesterol/HDL Ration Measurement: --  Triglycerides, Serum: 60  
BMI:   HbA1c: A1C with Estimated Average Glucose Result: 5.3 % (03-24-21 @ 09:25)    Glucose: POCT Blood Glucose.: 105 mg/dL (03-21-21 @ 14:05)    BP: 99/57 (03-27-21 @ 06:48) (99/57 - 124/90)  Lipid Panel: Date/Time: 03-24-21 @ 09:25  Cholesterol, Serum: 202  Direct LDL: --  HDL Cholesterol, Serum: 50  Total Cholesterol/HDL Ration Measurement: --  Triglycerides, Serum: 60

## 2021-04-08 NOTE — BH INPATIENT PSYCHIATRY PROGRESS NOTE - NSBHATTESTSEENBY_PSY_A_CORE
NP without Attending Psychiatrist
Attending Psychiatrist supervising NP/Trainee, meeting pt...
NP without Attending Psychiatrist
NP without Attending Psychiatrist
Attending Psychiatrist supervising NP/Trainee, meeting pt...

## 2021-04-08 NOTE — BH DISCHARGE NOTE NURSING/SOCIAL WORK/PSYCH REHAB - PATIENT PORTAL LINK FT
You can access the FollowMyHealth Patient Portal offered by [facility] by registering at the following website: [email or web address]. By joining [facility]’s FollowMyHealth portal, you will also be able to view your health information using other applications (apps) compatible with our system.

## 2021-04-08 NOTE — BH INPATIENT PSYCHIATRY PROGRESS NOTE - MSE OPTIONS
Unstructured MSE
Unstructured MSE
Structured MSE
Unstructured MSE
Structured MSE
Unstructured MSE
Structured MSE
Structured MSE

## 2021-04-08 NOTE — BH INPATIENT PSYCHIATRY PROGRESS NOTE - MSE UNSTRUCTURED FT
-Upon evaluation, patient appeared with good hygiene. Patient appears as stated age. Patient had proper eye contact with writer. No psychomotor retardation or agitation noted; gait steady. Patient is calm and cooperative. Patient’s speech noted to be normal in rate, tone, and volume. Patient’s mood is “fine”; affect euthymic. Thought process linear; patient A&O x3. Patient denies any SI or HI, intent or plan. Patient also denies AH/VH or delusions. Patient's impulse control is fair. Insight and judgment also fair.

## 2021-04-08 NOTE — BH INPATIENT PSYCHIATRY DISCHARGE NOTE - NSDCMRMEDTOKEN_GEN_ALL_CORE_FT
clonazePAM 0.5 mg oral tablet: 1 tab(s) orally once a day (at bedtime) MDD:0.5 mg  melatonin 3 mg oral tablet: 1 tab(s) orally once a day (at bedtime)  risperiDONE 2 mg oral tablet: 1 tab(s) orally once a day (at bedtime)

## 2021-04-08 NOTE — BH INPATIENT PSYCHIATRY PROGRESS NOTE - NSTXPSYCHOGOAL_PSY_ALL_CORE
Will verbalize 1 strategy to successfully cope with psychotic symptoms
Will identify 2 coping skills that assist with focus on reality
Will be able to report experiencing hallucinations to staff
Will verbalize 1 strategy to successfully cope with psychotic symptoms
Will verbalize 1 strategy to successfully cope with psychotic symptoms
Will identify 2 coping skills that assist with focus on reality
Will verbalize 1 strategy to successfully cope with psychotic symptoms
Will engage in a 15 minute conversation with no irrational content
Will identify 2 coping skills that assist with focus on reality
Will engage in a 15 minute conversation with no irrational content
Will verbalize 1 strategy to successfully cope with psychotic symptoms
Will identify 2 coping skills that assist with focus on reality

## 2021-04-08 NOTE — BH INPATIENT PSYCHIATRY PROGRESS NOTE - PRN MEDS
MEDICATIONS  (PRN):  acetaminophen   Tablet .. 325 milliGRAM(s) Oral every 6 hours PRN Mild Pain (1 - 3), Moderate Pain (4 - 6), Severe Pain (7 - 10)  diphenhydrAMINE 50 milliGRAM(s) Oral every 6 hours PRN agitation  diphenhydrAMINE   Injectable 50 milliGRAM(s) IntraMuscular once PRN agitation  haloperidol     Tablet 2 milliGRAM(s) Oral every 6 hours PRN confusion, agitation  haloperidol    Injectable 2 milliGRAM(s) IntraMuscular once PRN confusion, agitation  hydrOXYzine hydrochloride 50 milliGRAM(s) Oral every 6 hours PRN anxiety  LORazepam     Tablet 2 milliGRAM(s) Oral every 6 hours PRN agitation 2/2 psychiatric illness  LORazepam   Injectable 2 milliGRAM(s) IntraMuscular once PRN Agitation  
MEDICATIONS  (PRN):  acetaminophen   Tablet .. 325 milliGRAM(s) Oral every 6 hours PRN Mild Pain (1 - 3), Moderate Pain (4 - 6), Severe Pain (7 - 10)  diphenhydrAMINE 50 milliGRAM(s) Oral every 6 hours PRN agitation  diphenhydrAMINE   Injectable 50 milliGRAM(s) IntraMuscular once PRN agitation  haloperidol     Tablet 2 milliGRAM(s) Oral every 6 hours PRN confusion, agitation  haloperidol    Injectable 2 milliGRAM(s) IntraMuscular once PRN confusion, agitation  hydrOXYzine hydrochloride 50 milliGRAM(s) Oral every 6 hours PRN anxiety  LORazepam     Tablet 2 milliGRAM(s) Oral every 6 hours PRN agitation 2/2 psychiatric illness  LORazepam   Injectable 2 milliGRAM(s) IntraMuscular once PRN Agitation  
MEDICATIONS  (PRN):  acetaminophen   Tablet .. 325 milliGRAM(s) Oral every 6 hours PRN Mild Pain (1 - 3), Moderate Pain (4 - 6), Severe Pain (7 - 10)  diphenhydrAMINE 50 milliGRAM(s) Oral every 6 hours PRN agitation  diphenhydrAMINE   Injectable 50 milliGRAM(s) IntraMuscular once PRN agitation  LORazepam     Tablet 2 milliGRAM(s) Oral every 6 hours PRN agitation 2/2 psychiatric illness  LORazepam   Injectable 2 milliGRAM(s) IntraMuscular once PRN Agitation  
MEDICATIONS  (PRN):  acetaminophen   Tablet .. 325 milliGRAM(s) Oral every 6 hours PRN Mild Pain (1 - 3), Moderate Pain (4 - 6), Severe Pain (7 - 10)  diphenhydrAMINE 50 milliGRAM(s) Oral every 6 hours PRN agitation  diphenhydrAMINE   Injectable 50 milliGRAM(s) IntraMuscular once PRN agitation  haloperidol     Tablet 2 milliGRAM(s) Oral every 6 hours PRN confusion, agitation  haloperidol    Injectable 2 milliGRAM(s) IntraMuscular once PRN confusion, agitation  hydrOXYzine hydrochloride 50 milliGRAM(s) Oral every 6 hours PRN anxiety  LORazepam   Injectable 2 milliGRAM(s) IntraMuscular once PRN Agitation  
MEDICATIONS  (PRN):  acetaminophen   Tablet .. 325 milliGRAM(s) Oral every 6 hours PRN Mild Pain (1 - 3), Moderate Pain (4 - 6), Severe Pain (7 - 10)  diphenhydrAMINE 50 milliGRAM(s) Oral every 6 hours PRN agitation  diphenhydrAMINE   Injectable 50 milliGRAM(s) IntraMuscular once PRN agitation  haloperidol     Tablet 2 milliGRAM(s) Oral every 6 hours PRN confusion, agitation  haloperidol    Injectable 2 milliGRAM(s) IntraMuscular once PRN confusion, agitation  hydrOXYzine hydrochloride 50 milliGRAM(s) Oral every 6 hours PRN anxiety  LORazepam     Tablet 2 milliGRAM(s) Oral every 6 hours PRN agitation 2/2 psychiatric illness  LORazepam   Injectable 2 milliGRAM(s) IntraMuscular once PRN Agitation  
MEDICATIONS  (PRN):  acetaminophen   Tablet .. 325 milliGRAM(s) Oral every 6 hours PRN Mild Pain (1 - 3), Moderate Pain (4 - 6), Severe Pain (7 - 10)  diphenhydrAMINE 50 milliGRAM(s) Oral every 6 hours PRN agitation  diphenhydrAMINE   Injectable 50 milliGRAM(s) IntraMuscular once PRN agitation  hydrOXYzine hydrochloride 50 milliGRAM(s) Oral every 6 hours PRN anxiety  LORazepam     Tablet 2 milliGRAM(s) Oral every 6 hours PRN agitation 2/2 psychiatric illness  LORazepam   Injectable 2 milliGRAM(s) IntraMuscular once PRN Agitation  
MEDICATIONS  (PRN):  acetaminophen   Tablet .. 325 milliGRAM(s) Oral every 6 hours PRN Mild Pain (1 - 3), Moderate Pain (4 - 6), Severe Pain (7 - 10)  diphenhydrAMINE 50 milliGRAM(s) Oral every 6 hours PRN agitation  diphenhydrAMINE   Injectable 50 milliGRAM(s) IntraMuscular once PRN agitation  haloperidol     Tablet 2 milliGRAM(s) Oral every 6 hours PRN confusion, agitation  haloperidol    Injectable 2 milliGRAM(s) IntraMuscular once PRN confusion, agitation  hydrOXYzine hydrochloride 50 milliGRAM(s) Oral every 6 hours PRN anxiety  LORazepam     Tablet 2 milliGRAM(s) Oral every 6 hours PRN agitation 2/2 psychiatric illness  LORazepam   Injectable 2 milliGRAM(s) IntraMuscular once PRN Agitation  
MEDICATIONS  (PRN):  acetaminophen   Tablet .. 325 milliGRAM(s) Oral every 6 hours PRN Mild Pain (1 - 3), Moderate Pain (4 - 6), Severe Pain (7 - 10)  diphenhydrAMINE 50 milliGRAM(s) Oral every 6 hours PRN agitation  diphenhydrAMINE   Injectable 50 milliGRAM(s) IntraMuscular once PRN agitation  haloperidol     Tablet 2 milliGRAM(s) Oral every 6 hours PRN confusion, agitation  haloperidol    Injectable 2 milliGRAM(s) IntraMuscular once PRN confusion, agitation  hydrOXYzine hydrochloride 50 milliGRAM(s) Oral every 6 hours PRN anxiety  LORazepam   Injectable 2 milliGRAM(s) IntraMuscular once PRN Agitation

## 2021-04-08 NOTE — BH INPATIENT PSYCHIATRY PROGRESS NOTE - NSTXPSYCHODATETRGT_PSY_ALL_CORE
08-Apr-2021
08-Apr-2021
25-Mar-2021
12-Apr-2021
12-Apr-2021
30-Mar-2021
05-Apr-2021
30-Mar-2021
08-Apr-2021
05-Apr-2021
30-Mar-2021
08-Apr-2021

## 2021-04-08 NOTE — BH INPATIENT PSYCHIATRY DISCHARGE NOTE - DESCRIPTION
Pt lives at home with his mother, father, and younger sister. 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 Currently unemployed, looking for work. Denies current substance use, endorses h/o of alcohol and marijuana use but could not recall last use.

## 2021-04-08 NOTE — BH DISCHARGE NOTE NURSING/SOCIAL WORK/PSYCH REHAB - NSBHDCAGENCY1FT_PSY_A_CORE
[facility]  Intake with  [facility]  Intake with Randolph Forbes / Aly

## 2021-04-08 NOTE — BH INPATIENT PSYCHIATRY DISCHARGE NOTE - NSBHMETABOLIC_PSY_ALL_CORE_FT
BMI:   HbA1c: A1C with Estimated Average Glucose Result: 5.3 % (03-24-21 @ 09:25)    Glucose: POCT Blood Glucose.: 105 mg/dL (03-21-21 @ 14:05)    BP: 115/62 (04-08-21 @ 07:57) (93/60 - 127/75)  Lipid Panel: Date/Time: 03-24-21 @ 09:25  Cholesterol, Serum: 202  Direct LDL: --  HDL Cholesterol, Serum: 50  Total Cholesterol/HDL Ration Measurement: --  Triglycerides, Serum: 60

## 2021-04-08 NOTE — BH INPATIENT PSYCHIATRY PROGRESS NOTE - NSTXDCOPLKINTERMD_PSY_ALL_CORE
will connect pt to outpatient psychiatrist and provide handoff after discharge

## 2021-04-08 NOTE — BH DISCHARGE NOTE NURSING/SOCIAL WORK/PSYCH REHAB - NSCDUDCCRISIS_PSY_A_CORE
[facility]  1 (771) [facility] ([phone])  Text "WELL" to 49019  Website: www.[employer]/.[facility] 1 (599) 881-CNIN (3522) Website: www.safehorizon.org/.National Suicide Prevention Lifeline 2 (111) 277-9973/.  [facility]  1 (482) LIFENET (734-1547)/.  [facility]  [address] [place] [id number] [phone]   Hours:  Monday through Friday from 9 AM to 3 PM/.  [employer]  [employer] - [facility] Crisis Line  [phone], Option 1

## 2021-04-08 NOTE — BH INPATIENT PSYCHIATRY PROGRESS NOTE - NSTXPROBDCOPLK_PSY_ALL_CORE
DISCHARGE ISSUE - LACK OF APPROPRIATE OUTPATIENT SERVICES

## 2021-04-08 NOTE — BH INPATIENT PSYCHIATRY PROGRESS NOTE - NSTXDCOPLKGOAL_PSY_ALL_CORE
Will agree to consider an appropriate level of outpatient care

## 2021-04-08 NOTE — BH INPATIENT PSYCHIATRY DISCHARGE NOTE - HPI (INCLUDE ILLNESS QUALITY, SEVERITY, DURATION, TIMING, CONTEXT, MODIFYING FACTORS, ASSOCIATED SIGNS AND SYMPTOMS)
21 yo male, domiciled with family, without significant PPHx, no known suicide attempts, legal, violence or substance history, with PMH/PSHx of nasal septoplasty 3/3/21, who was BIB family for concern for confusion in the context of decreased sleep and increased talkativeness.    Patient was interviewed in a conference room, with interview limited by language barrier as well as likely psychotic symptoms. Chart reviewed. Case discussed with interdisciplinary team. No acute events overnight since arrival on 1S. Per staff pt appears bizarre, oddly related, disorganized, stares off, visible, reports feeling depressed at times; denies SI, AVH. Compliant with standing medication. Received PRN Ativan 2mg for agitation shortly after arriving on unit at 3pm, no PRNs overnight. Per sleep log, interrupted sleep of 4.5hrs total.    Pt preferred to speak in English instead of getting a  for Sinhalese-he was offered  services multiple times and refused. He was only oriented to person. Pt thought he was in school, used the clock in room to answer month/date/day, however missed the year as this was not visible on clock, also did not know [place] president.    The patient is unable to remember the events leading up to his admission.     He reported his mood as good. He asked if the "green people can hear us" (referring to unit MHWs). He endorsed AH of voices singing the word "stable" yesterday. Denies any command AH (despite report yesterday). Denies current AVH, despite appearing internally preoccupied. Denies paranoia or ideas of reference (although unclear if pt fully understood question about IoR). He states he has having trouble getting his thoughts out because of his difficulties with English. For the past week pt states he has been "sad, but am fine now." Is inconsistent in his response to questions about hopelessness. Reports that he was having difficulty sleeping for a few days prior to admission due to racing thoughts, but has difficulty elaborating on this. Denies any appetite changes.  He also endorsed racing thoughts but states he had trouble getting them out due to language difficulties. Denies current substance use, endorses h/o of alcohol and marijuana use but could not recall frequency of use or date of last use. Denies SI/HI.    He says that he lives with his parents and younger sister and was studying computer science at [employer]. He is looking for work and is interested in anything, but wants to work for [employer] and design cars. States he enjoys playing video games and continues to enjoy this.    Reports good PO intake currently. Encouraged adequate hydration in light of orthostatic VS, denies dizziness. No physical complaints today. Provides verbal consent to speak with family (please see collateral as documented in separate Chart note).

## 2021-04-08 NOTE — BH INPATIENT PSYCHIATRY PROGRESS NOTE - NSBHCONTPROVIDER_PSY_ALL_CORE
Not applicable

## 2021-04-08 NOTE — BH INPATIENT PSYCHIATRY PROGRESS NOTE - NSDCCRITERIA_PSY_ALL_CORE
when symptoms reduced

## 2021-04-08 NOTE — BH INPATIENT PSYCHIATRY PROGRESS NOTE - NSBHCONSULTIPREASON_PSY_A_CORE
danger to self; mental illness expected to respond to inpatient care

## 2021-04-08 NOTE — BH INPATIENT PSYCHIATRY PROGRESS NOTE - NSCGISEVERILLNESS_PSY_ALL_CORE
5 = Markedly ill - intrusive symptoms that distinctly impair social/occupational function or cause intrusive levels of distress
5 = Markedly ill - intrusive symptoms that distinctly impair social/occupational function or cause intrusive levels of distress
4 = Moderately ill – overt symptoms causing noticeable, but modest, functional impairment or distress; symptom level may warrant medication
5 = Markedly ill - intrusive symptoms that distinctly impair social/occupational function or cause intrusive levels of distress
4 = Moderately ill – overt symptoms causing noticeable, but modest, functional impairment or distress; symptom level may warrant medication

## 2021-04-08 NOTE — BH INPATIENT PSYCHIATRY PROGRESS NOTE - NSBHASSESSSUMMFT_PSY_ALL_CORE
19 yo male, domiciled with family, without significant PPHx, no known suicide attempts, legal, violence or substance history, with PMH/PSHx of nasal septoplasty 3/3/21, who was BIB family for concern for confusion in the context of decreased sleep and increased talkativeness. Upon assessment, patient is A&O x3. Patient reports improved sleeping patterns and normalcy in verbal patterns. Patient also denies any AH/VH or delusions and any SI or HI, intent or plan.  PLAN: Patient to be discharged 4/8 and continue treatment plan outpatient. Patient to continue Risperdal 2mg bedtime outpatient. Patient to continue Klonopin 0.5mg bedtime outpatient. Patient to continue Melatonin 3mg bedtime outpatient.    RISK ASSESSMENT: The patient is at chronic risk of harm to self and others given diagnosis of psychotic disorder not due to substance or known physiological condition, hx of suicidal gestures (holding multiple Tylenol pills in hand and holding bottle of shampoo to ingest), and current psychiatric hospitalization.  Protective factors include no access to firearms, family, friends, video games, and engagement with treatment and desire to obtain treatment. Patient with stable housing and is future/goal-oriented as he plans to further his studies in [employer] science.  On admission the patient was felt to be at an acutely elevated risk of harm to self or others as he was displaying psychosis with passive SI, without abortive medication or pharmacotherapy established with the goal of treating/preventing them. Over the hospital course medications were started and patient was set up with after-care plans.      Although patient has an elevated chronic risk of harm to self or others, acute risk has been addressed during inpatient hospitalization. Further hospitalization is no longer warranted. Patient is ready for transition to outpatient care for further management.

## 2021-04-08 NOTE — BH INPATIENT PSYCHIATRY PROGRESS NOTE - NSTXDCOPLKDATEEST_PSY_ALL_CORE
06-Apr-2021
23-Mar-2021
30-Mar-2021
23-Mar-2021
30-Mar-2021
23-Mar-2021
30-Mar-2021
06-Apr-2021
06-Apr-2021
30-Mar-2021
23-Mar-2021

## 2021-04-08 NOTE — BH INPATIENT PSYCHIATRY PROGRESS NOTE - NSTXPSYCHOINTERMD_PSY_ALL_CORE
continue Risperdal titration to target psychotic Sx, will offer CAR at discharge
continue Risperdal to target psychotic Sx, will offer CAR at discharge
starting Risperdal titration to target psychotic Sx, will consider CAR 
continue Risperdal titration to target psychotic Sx, will offer CAR at discharge
continue Risperdal to target psychotic Sx, will offer CAR at discharge
starting Risperdal titration to target psychotic Sx, will consider CAR 
continue Risperdal to target psychotic Sx, will offer CAR at discharge
continue Risperdal titration to target psychotic Sx, will offer CAR at discharge
continue Risperdal to target psychotic Sx, will offer CAR at discharge
continue Risperdal to target psychotic Sx, will offer CAR at discharge

## 2021-04-08 NOTE — BH INPATIENT PSYCHIATRY PROGRESS NOTE - NSBHCHARTREVIEWVS_PSY_A_CORE FT
Vital Signs Last 24 Hrs  T(C): 36.4 (06 Apr 2021 07:33), Max: 36.8 (05 Apr 2021 20:53)  T(F): 97.5 (06 Apr 2021 07:33), Max: 98.3 (05 Apr 2021 20:53)  HR: 79 (06 Apr 2021 07:33) (79 - 79)  BP: 127/75 (06 Apr 2021 07:33) (127/75 - 127/75)  BP(mean): --  RR: --  SpO2: --
Vital Signs Last 24 Hrs  T(C): 36.1 (30 Mar 2021 07:56), Max: 37.1 (29 Mar 2021 20:45)  T(F): 97 (30 Mar 2021 07:56), Max: 98.8 (29 Mar 2021 20:45)  HR: 80 (30 Mar 2021 07:56) (80 - 80)  BP: 117/83 (30 Mar 2021 07:56) (117/83 - 117/83)  BP(mean): --  RR: --  SpO2: --
Vital Signs Last 24 Hrs  T(C): 36.4 (31 Mar 2021 08:17), Max: 37.1 (30 Mar 2021 20:44)  T(F): 97.5 (31 Mar 2021 08:17), Max: 98.7 (30 Mar 2021 20:44)  HR: 90 (31 Mar 2021 08:17) (90 - 90)  BP: 111/65 (31 Mar 2021 08:17) (111/65 - 111/65)  BP(mean): --  RR: --  SpO2: --
Vital Signs Last 24 Hrs  T(C): 37.1 (23 Mar 2021 06:28), Max: 37.1 (23 Mar 2021 06:28)  T(F): 98.8 (23 Mar 2021 06:28), Max: 98.8 (23 Mar 2021 06:28)  HR: --  BP: --  BP(mean): --  RR: --  SpO2: --
Vital Signs Last 24 Hrs  T(C): 36.8 (08 Apr 2021 07:57), Max: 36.8 (07 Apr 2021 20:58)  T(F): 98.3 (08 Apr 2021 07:57), Max: 98.3 (07 Apr 2021 20:58)  HR: 97 (08 Apr 2021 07:57) (97 - 97)  BP: 115/62 (08 Apr 2021 07:57) (115/62 - 115/62)  BP(mean): --  RR: --  SpO2: --
Vital Signs Last 24 Hrs  T(C): 36.7 (29 Mar 2021 07:35), Max: 36.7 (29 Mar 2021 07:35)  T(F): 98 (29 Mar 2021 07:35), Max: 98 (29 Mar 2021 07:35)  HR: 79 (29 Mar 2021 07:35) (79 - 79)  BP: 136/96 (29 Mar 2021 07:35) (136/96 - 136/96)  BP(mean): --  RR: --  SpO2: --
Vital Signs Last 24 Hrs  T(C): 36.1 (01 Apr 2021 08:04), Max: 36.7 (31 Mar 2021 20:37)  T(F): 97 (01 Apr 2021 08:04), Max: 98.1 (31 Mar 2021 20:37)  HR: 72 (01 Apr 2021 08:04) (72 - 72)  BP: 116/69 (01 Apr 2021 08:04) (116/69 - 116/69)  BP(mean): --  RR: --  SpO2: --
Vital Signs Last 24 Hrs  T(C): 37 (25 Mar 2021 06:16), Max: 37 (24 Mar 2021 20:34)  T(F): 98.6 (25 Mar 2021 06:16), Max: 98.6 (24 Mar 2021 20:34)  HR: 100 (25 Mar 2021 06:16) (100 - 100)  BP: 124/90 (25 Mar 2021 06:16) (124/90 - 124/90)  BP(mean): --  RR: --  SpO2: --
Vital Signs Last 24 Hrs  T(C): 36.3 (26 Mar 2021 08:21), Max: 36.6 (25 Mar 2021 20:07)  T(F): 97.4 (26 Mar 2021 08:21), Max: 97.8 (25 Mar 2021 20:07)  HR: 88 (26 Mar 2021 08:21) (88 - 88)  BP: 118/77 (26 Mar 2021 08:21) (118/77 - 118/77)  BP(mean): --  RR: --  SpO2: --
Vital Signs Last 24 Hrs  T(C): 36.6 (02 Apr 2021 06:25), Max: 36.7 (01 Apr 2021 22:42)  T(F): 97.8 (02 Apr 2021 06:25), Max: 98 (01 Apr 2021 22:42)  HR: 88 (02 Apr 2021 06:25) (88 - 88)  BP: 110/64 (02 Apr 2021 06:25) (110/64 - 110/64)  BP(mean): --  RR: --  SpO2: --
Vital Signs Last 24 Hrs  T(C): 36.6 (24 Mar 2021 08:25), Max: 36.9 (23 Mar 2021 19:57)  T(F): 97.8 (24 Mar 2021 08:25), Max: 98.5 (23 Mar 2021 19:57)  HR: 94 (24 Mar 2021 08:25) (94 - 94)  BP: 134/93 (24 Mar 2021 08:25) (134/93 - 134/93)  BP(mean): --  RR: --  SpO2: --
Vital Signs Last 24 Hrs  T(C): 36.4 (07 Apr 2021 08:00), Max: 36.9 (06 Apr 2021 20:37)  T(F): 97.5 (07 Apr 2021 08:00), Max: 98.4 (06 Apr 2021 20:37)  HR: --  BP: 93/60 (07 Apr 2021 08:00) (93/60 - 93/60)  BP(mean): 86 (07 Apr 2021 08:00) (86 - 86)  RR: --  SpO2: --
Vital Signs Last 24 Hrs  T(C): 36.4 (05 Apr 2021 06:27), Max: 36.8 (04 Apr 2021 20:19)  T(F): 97.5 (05 Apr 2021 06:27), Max: 98.2 (04 Apr 2021 20:19)  HR: 97 (05 Apr 2021 06:27) (97 - 97)  BP: 125/59 (05 Apr 2021 06:27) (125/59 - 125/59)  BP(mean): 73 (05 Apr 2021 06:27) (73 - 73)  RR: --  SpO2: --
Vital Signs Last 24 Hrs  T(C): 36.3 (27 Mar 2021 06:48), Max: 36.6 (26 Mar 2021 20:44)  T(F): 97.3 (27 Mar 2021 06:48), Max: 97.9 (26 Mar 2021 20:44)  HR: 76 (27 Mar 2021 06:48) (76 - 76)  BP: 99/57 (27 Mar 2021 06:48) (99/57 - 99/57)  BP(mean): --  RR: --  SpO2: --

## 2021-04-08 NOTE — BH INPATIENT PSYCHIATRY PROGRESS NOTE - NSTXPSYCHOPROGRES_PSY_ALL_CORE
No Change
Met - goal discontinued
Improving
No Change
No Change
Improving
Improving
No Change
Improving
Improving
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
## 2021-04-08 NOTE — BH INPATIENT PSYCHIATRY PROGRESS NOTE - NSTXPSYCHODATEEST_PSY_ALL_CORE
23-Mar-2021
01-Apr-2021
23-Mar-2021
22-Mar-2021
22-Mar-2021
23-Mar-2021
01-Apr-2021
23-Mar-2021
22-Mar-2021
23-Mar-2021
01-Apr-2021
23-Mar-2021
22-Mar-2021
23-Mar-2021

## 2021-04-08 NOTE — BH DISCHARGE NOTE NURSING/SOCIAL WORK/PSYCH REHAB - NSDCPRGOAL_PSY_ALL_CORE
Pt has demonstrated significant progress towards psychiatric rehabilitation goals during the current hospitalization. Pt is able to identify spending time with family, sports, drawing, and videogames as healthy coping strategies to better manage symptoms. Pt has been increasingly receptive to skill development throughout hospitalization. Pt endorses improvements in mood, sleep, appetite, and thought processing. Pt presents with a much brighter affect and reports increased hopefulness for the future. Pt denies any current AH/VH or paranoid. Pt has been compliant with medications with positive effect. Pt denies any current suicidal ideation, intent, or plan at this time. Due to the COVID-19 pandemic, unit structure and activities are re-evaluated on a consistent basis in effort to maintain the safety of pts and staff. Pt has been increasingly receptive to 1:1 sessions and group activities. Pt has attended approximately 80% of daily psychiatric rehabilitation groups during the current hospitalization. Pt was quiet in groups, however participated appropriately. Pt did not require any redirection and was not a behavioral management issue on the unit. Pt has been visible and demonstrated some positive socialization with select peers. Pt was provided with a Press Ganey survey to complete prior to discharge.

## 2021-04-08 NOTE — BH INPATIENT PSYCHIATRY PROGRESS NOTE - NSTXDCOPLKPROGRES_PSY_ALL_CORE
No Change
No Change
Improving
No Change
Improving
No Change
Improving
No Change
No Change
Improving

## 2021-04-08 NOTE — BH INPATIENT PSYCHIATRY PROGRESS NOTE - NSTXDCOPLKDATETRGT_PSY_ALL_CORE
06-Apr-2021
30-Mar-2021
06-Apr-2021
06-Apr-2021
30-Mar-2021
06-Apr-2021
30-Mar-2021
13-Apr-2021
30-Mar-2021

## 2021-04-08 NOTE — BH INPATIENT PSYCHIATRY DISCHARGE NOTE - HOSPITAL COURSE
Dates of hospitalization: 3/22/2021 – 4/8/2021    On admission interview, patient presented with the following signs and symptoms: global insomnia, odd and unpredictable behavior, mood and affective lability, intense staring, internal preoccupation, isolation. On exam pt had a concrete, disorganized TP and behavior, with thought blocking and formal thought disorder more evident after interviewing Pt with [employer] phone .. He reported AH of voices saying “hello” and VH of monsters on the unit early in hospitalization. Pt’s orientation, memory recall prior to admission, insight and judgement were limited.    Diagnostic impression is unspecified psychosis with consideration for brief psychotic episode. 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     Patient was started on Risperdal to target psychosis which was titrated to a dose of 2mg at bedtime with good effect and tolerability. Patient also received clonazepam 0.5mg at bedtime to improve sleep and stabilize mood.     Patient’s symptoms gradually improved over the course of the hospitalization. He appeared more linear and organized on interviews, behaving appropriately with smiling and EC, socializing with peers. He no longer seemed internally preoccupied. Pt consistently denied paranoia, referential delusions, AH and VH for weeks prior to discharge. Sleep improved and overall mood became more stable. Pt denied any depression, anxiety, passive or active SIIP. At time of discharge, patient denied any active psychotic symptoms, gained more insight into diagnosis, and was receptive to psychoeducation and motivated to continue outpatient treatment.    There were no behavioral problems on the unit.  Patient did not become agitated and did not require emergent intramuscular medications or seclusion/restraints.  Patient did not self-harm on the unit. Patient remained actively engaged in treatment. Patient participated in group and milieu therapy. Patient got along appropriately with staff and peers. Family was contacted at time of admission to obtain collateral, provide psychoeducation, and collaboratively treatment plan.  Family was also contacted prior to discharge for safety planning and disposition planning. [employer] phone interpreters were used when available with the patient and family.     On day of discharge, the patient has improved significantly and no longer requires inpatient treatment and care. Patient denies all suicidal and aggressive ideation, intent and plan. Patient displays improved psychotic symptoms. Patient is not judged to be an acute danger to self or others at this time. He is stable for transition to outpatient level of care.     Risk Assessment:   The patient is at chronic risk of harm to self and others given recent diagnosis of psychosis, recent immigration to [place], male gender. Protective factors include no prior psychiatric history, no suicide attempts, supportive family, domiciled, motivation for treatment, help-seeking, and positive attitude.    On admission the patient was felt to be at an acutely elevated risk of harm to self and others as he was actively psychotic with AH and VH, odd relatedness, concrete, illogical, disorganized TP and behavior, intense labile mood, recent insomnia, limited health literacy, suicidal gesture and poor insight. Over the hospital course these risk factors were addressed as Pt received medication targeting psychosis and insomnia, and engaged in group and milieu therapy. Pt responded well to his treatment plan – Pt reports a remission in AH/VH, stable mood, and denies SI/HI. Sleep improved significantly. His orientation improved. TP became more organized, linear and logical. Patient demonstrated ability to care for self appropriately. Pt continues to have minimally spontaneous speech during interviews, which may be related to language barriers and is also similar to baseline personality per family’s report. He gradually became more social with other patients. On day of discharge, Pt denied any depression, anxiety, passive or active SIIP. He did not have any amos psychotic symptoms such as AH/VH, paranoia, or other delusions. He gained more insight into diagnosis, and receptive to psychoeducation and motivated to continue outpatient treatment. He was future oriented in multiple spheres to treatment, pursuing school/work, family and peer relationships.     Given the above, the patient is no longer felt to be at an acutely elevated risk of harm to self and others and therefore no longer requires inpatient hospitalization. He is stable for transition to outpatient level of care.    Patient will be discharged with the following DSM5 diagnoses:  1. Unspecified psychosis     Patient will be discharged on the following medications (14-day supply given):  1. Risperdal 2mg qhs for psychosis   2. Klonopin 0.5 mg qhs for sleep and anxiety  3. Melatonin 3mg hs

## 2021-04-08 NOTE — BH INPATIENT PSYCHIATRY PROGRESS NOTE - CURRENT MEDICATION
MEDICATIONS  (STANDING):  clonazePAM  Tablet 0.5 milliGRAM(s) Oral at bedtime  melatonin. 3 milliGRAM(s) Oral at bedtime  risperiDONE   Tablet 1 milliGRAM(s) Oral at bedtime    MEDICATIONS  (PRN):  acetaminophen   Tablet .. 325 milliGRAM(s) Oral every 6 hours PRN Mild Pain (1 - 3), Moderate Pain (4 - 6), Severe Pain (7 - 10)  diphenhydrAMINE 50 milliGRAM(s) Oral every 6 hours PRN agitation  diphenhydrAMINE   Injectable 50 milliGRAM(s) IntraMuscular once PRN agitation  haloperidol     Tablet 2 milliGRAM(s) Oral every 6 hours PRN confusion, agitation  haloperidol    Injectable 2 milliGRAM(s) IntraMuscular once PRN confusion, agitation  hydrOXYzine hydrochloride 50 milliGRAM(s) Oral every 6 hours PRN anxiety  LORazepam     Tablet 2 milliGRAM(s) Oral every 6 hours PRN agitation 2/2 psychiatric illness  LORazepam   Injectable 2 milliGRAM(s) IntraMuscular once PRN Agitation  
MEDICATIONS  (STANDING):  melatonin. 3 milliGRAM(s) Oral at bedtime    MEDICATIONS  (PRN):  acetaminophen   Tablet .. 325 milliGRAM(s) Oral every 6 hours PRN Mild Pain (1 - 3), Moderate Pain (4 - 6), Severe Pain (7 - 10)  diphenhydrAMINE 50 milliGRAM(s) Oral every 6 hours PRN agitation  diphenhydrAMINE   Injectable 50 milliGRAM(s) IntraMuscular once PRN agitation  LORazepam     Tablet 2 milliGRAM(s) Oral every 6 hours PRN agitation 2/2 psychiatric illness  LORazepam   Injectable 2 milliGRAM(s) IntraMuscular once PRN Agitation  
MEDICATIONS  (STANDING):  clonazePAM  Tablet 0.5 milliGRAM(s) Oral at bedtime  melatonin. 3 milliGRAM(s) Oral at bedtime  risperiDONE   Tablet 2 milliGRAM(s) Oral at bedtime    MEDICATIONS  (PRN):  acetaminophen   Tablet .. 325 milliGRAM(s) Oral every 6 hours PRN Mild Pain (1 - 3), Moderate Pain (4 - 6), Severe Pain (7 - 10)  diphenhydrAMINE 50 milliGRAM(s) Oral every 6 hours PRN agitation  diphenhydrAMINE   Injectable 50 milliGRAM(s) IntraMuscular once PRN agitation  haloperidol     Tablet 2 milliGRAM(s) Oral every 6 hours PRN confusion, agitation  haloperidol    Injectable 2 milliGRAM(s) IntraMuscular once PRN confusion, agitation  hydrOXYzine hydrochloride 50 milliGRAM(s) Oral every 6 hours PRN anxiety  LORazepam     Tablet 2 milliGRAM(s) Oral every 6 hours PRN agitation 2/2 psychiatric illness  LORazepam   Injectable 2 milliGRAM(s) IntraMuscular once PRN Agitation  
MEDICATIONS  (STANDING):  clonazePAM  Tablet 0.5 milliGRAM(s) Oral at bedtime  melatonin. 3 milliGRAM(s) Oral at bedtime  risperiDONE   Tablet 2 milliGRAM(s) Oral at bedtime    MEDICATIONS  (PRN):  acetaminophen   Tablet .. 325 milliGRAM(s) Oral every 6 hours PRN Mild Pain (1 - 3), Moderate Pain (4 - 6), Severe Pain (7 - 10)  diphenhydrAMINE 50 milliGRAM(s) Oral every 6 hours PRN agitation  diphenhydrAMINE   Injectable 50 milliGRAM(s) IntraMuscular once PRN agitation  haloperidol     Tablet 2 milliGRAM(s) Oral every 6 hours PRN confusion, agitation  haloperidol    Injectable 2 milliGRAM(s) IntraMuscular once PRN confusion, agitation  hydrOXYzine hydrochloride 50 milliGRAM(s) Oral every 6 hours PRN anxiety  LORazepam   Injectable 2 milliGRAM(s) IntraMuscular once PRN Agitation  
MEDICATIONS  (STANDING):  clonazePAM  Tablet 0.5 milliGRAM(s) Oral at bedtime  melatonin. 3 milliGRAM(s) Oral at bedtime  risperiDONE   Tablet 1 milliGRAM(s) Oral at bedtime    MEDICATIONS  (PRN):  acetaminophen   Tablet .. 325 milliGRAM(s) Oral every 6 hours PRN Mild Pain (1 - 3), Moderate Pain (4 - 6), Severe Pain (7 - 10)  diphenhydrAMINE 50 milliGRAM(s) Oral every 6 hours PRN agitation  diphenhydrAMINE   Injectable 50 milliGRAM(s) IntraMuscular once PRN agitation  haloperidol     Tablet 2 milliGRAM(s) Oral every 6 hours PRN confusion, agitation  haloperidol    Injectable 2 milliGRAM(s) IntraMuscular once PRN confusion, agitation  hydrOXYzine hydrochloride 50 milliGRAM(s) Oral every 6 hours PRN anxiety  LORazepam     Tablet 2 milliGRAM(s) Oral every 6 hours PRN agitation 2/2 psychiatric illness  LORazepam   Injectable 2 milliGRAM(s) IntraMuscular once PRN Agitation  
MEDICATIONS  (STANDING):  clonazePAM  Tablet 0.5 milliGRAM(s) Oral at bedtime  melatonin. 3 milliGRAM(s) Oral at bedtime  risperiDONE   Tablet 2 milliGRAM(s) Oral at bedtime    MEDICATIONS  (PRN):  acetaminophen   Tablet .. 325 milliGRAM(s) Oral every 6 hours PRN Mild Pain (1 - 3), Moderate Pain (4 - 6), Severe Pain (7 - 10)  diphenhydrAMINE 50 milliGRAM(s) Oral every 6 hours PRN agitation  diphenhydrAMINE   Injectable 50 milliGRAM(s) IntraMuscular once PRN agitation  haloperidol     Tablet 2 milliGRAM(s) Oral every 6 hours PRN confusion, agitation  haloperidol    Injectable 2 milliGRAM(s) IntraMuscular once PRN confusion, agitation  hydrOXYzine hydrochloride 50 milliGRAM(s) Oral every 6 hours PRN anxiety  LORazepam     Tablet 2 milliGRAM(s) Oral every 6 hours PRN agitation 2/2 psychiatric illness  LORazepam   Injectable 2 milliGRAM(s) IntraMuscular once PRN Agitation  
MEDICATIONS  (STANDING):  melatonin. 3 milliGRAM(s) Oral at bedtime  risperiDONE   Tablet 1 milliGRAM(s) Oral at bedtime    MEDICATIONS  (PRN):  acetaminophen   Tablet .. 325 milliGRAM(s) Oral every 6 hours PRN Mild Pain (1 - 3), Moderate Pain (4 - 6), Severe Pain (7 - 10)  diphenhydrAMINE 50 milliGRAM(s) Oral every 6 hours PRN agitation  diphenhydrAMINE   Injectable 50 milliGRAM(s) IntraMuscular once PRN agitation  hydrOXYzine hydrochloride 50 milliGRAM(s) Oral every 6 hours PRN anxiety  LORazepam     Tablet 2 milliGRAM(s) Oral every 6 hours PRN agitation 2/2 psychiatric illness  LORazepam   Injectable 2 milliGRAM(s) IntraMuscular once PRN Agitation  
MEDICATIONS  (STANDING):  clonazePAM  Tablet 0.5 milliGRAM(s) Oral at bedtime  melatonin. 3 milliGRAM(s) Oral at bedtime  risperiDONE   Tablet 2 milliGRAM(s) Oral at bedtime    MEDICATIONS  (PRN):  acetaminophen   Tablet .. 325 milliGRAM(s) Oral every 6 hours PRN Mild Pain (1 - 3), Moderate Pain (4 - 6), Severe Pain (7 - 10)  diphenhydrAMINE 50 milliGRAM(s) Oral every 6 hours PRN agitation  diphenhydrAMINE   Injectable 50 milliGRAM(s) IntraMuscular once PRN agitation  haloperidol     Tablet 2 milliGRAM(s) Oral every 6 hours PRN confusion, agitation  haloperidol    Injectable 2 milliGRAM(s) IntraMuscular once PRN confusion, agitation  hydrOXYzine hydrochloride 50 milliGRAM(s) Oral every 6 hours PRN anxiety  LORazepam   Injectable 2 milliGRAM(s) IntraMuscular once PRN Agitation  
MEDICATIONS  (STANDING):  clonazePAM  Tablet 0.5 milliGRAM(s) Oral at bedtime  melatonin. 3 milliGRAM(s) Oral at bedtime  risperiDONE   Tablet 2 milliGRAM(s) Oral at bedtime    MEDICATIONS  (PRN):  acetaminophen   Tablet .. 325 milliGRAM(s) Oral every 6 hours PRN Mild Pain (1 - 3), Moderate Pain (4 - 6), Severe Pain (7 - 10)  diphenhydrAMINE 50 milliGRAM(s) Oral every 6 hours PRN agitation  diphenhydrAMINE   Injectable 50 milliGRAM(s) IntraMuscular once PRN agitation  haloperidol     Tablet 2 milliGRAM(s) Oral every 6 hours PRN confusion, agitation  haloperidol    Injectable 2 milliGRAM(s) IntraMuscular once PRN confusion, agitation  hydrOXYzine hydrochloride 50 milliGRAM(s) Oral every 6 hours PRN anxiety  LORazepam     Tablet 2 milliGRAM(s) Oral every 6 hours PRN agitation 2/2 psychiatric illness  LORazepam   Injectable 2 milliGRAM(s) IntraMuscular once PRN Agitation

## 2021-04-14 ENCOUNTER — OUTPATIENT (OUTPATIENT)
Dept: OUTPATIENT SERVICES | Facility: HOSPITAL | Age: 21
LOS: 1 days | Discharge: HOME | End: 2021-04-14
Payer: MEDICAID

## 2021-04-14 DIAGNOSIS — Z98.890 OTHER SPECIFIED POSTPROCEDURAL STATES: Chronic | ICD-10-CM

## 2021-04-14 PROCEDURE — 90792 PSYCH DIAG EVAL W/MED SRVCS: CPT | Mod: 95

## 2021-04-15 DIAGNOSIS — F29 UNSPECIFIED PSYCHOSIS NOT DUE TO A SUBSTANCE OR KNOWN PHYSIOLOGICAL CONDITION: ICD-10-CM

## 2021-09-16 PROCEDURE — 99214 OFFICE O/P EST MOD 30 MIN: CPT | Mod: 95

## 2021-11-16 PROCEDURE — 99214 OFFICE O/P EST MOD 30 MIN: CPT | Mod: 95

## 2022-02-13 NOTE — BH INPATIENT PSYCHIATRY PROGRESS NOTE - MSE UNSTRUCTURED FT
The patient appears stated age, disheveled, and dressed in hospital gown with chest exposed and no shoes.  The patient is tense at times, attempts to be cooperative with the interview, although distracted and requires redirection, maintained avoidant eye contact with odd relatedness.  No psychomotor agitation or retardation noted, no abnormal movements.  Steady gait observed.  The patient's speech is less spontaneous, with increased latency, normal in tone and volume.  The patient's mood is "good."  Affect is labile, inappropriate.  Thought process is concrete with interview questions, disorganized, illogical. Thought content notable for limited history and insight of Sx, and wanting to see family. Denies current delusional content.  Denies any suicidal or homicidal ideation, intent, or plan. Denies current AH/VH.  Cognition grossly intact, AAOx1 to self only. Insight is poor.  Judgment is poor. Impulse control has been fair on the unit. - - - The patient appears stated age, disheveled, and dressed in hospital gown with chest exposed and no shoes.  The patient is tense at times, attempts to be cooperative with the interview, although distracted and requires redirection, maintained avoidant eye contact with odd relatedness.  No psychomotor agitation or retardation noted, no abnormal movements.  Steady gait observed.  The patient's speech is less spontaneous, with increased latency, normal in tone and volume.  The patient's mood is "better than yesterday."  Affect is labile, inappropriate.  Thought process is concrete with interview questions, disorganized, illogical. Thought content notable for limited history and insight of Sx, and wanting to see family. Denies current delusional content.  Denies any suicidal or homicidal ideation, intent, or plan. Denies current AH/VH.  Cognition grossly intact, AAOx3 with prompting. Insight is poor.  Judgment is poor. Impulse control has been fair on the unit. The patient appears stated age, disheveled, and dressed in hospital gown.  The patient is calm, mostly cooperative with the interview, although distracted and requires redirection, maintained appropriate to avoidant eye contact with odd relatedness.  No psychomotor agitation or retardation noted, no abnormal movements.  Steady gait observed.  The patient's speech is more spontaneous, normal in tone and volume.  The patient's mood is "better than yesterday."  Affect is labile, inappropriate.  Thought process is concrete with interview questions, disorganized at times. Thought content notable for limited history and insight of Sx, and wanting to see family. Denies current delusional content.  Denies any suicidal or homicidal ideation, intent, or plan. Denies current AH/VH.  Cognition grossly intact, AAOx3 with prompting. Insight is poor.  Judgment is poor. Impulse control has been fair on the unit. The patient appears stated age, disheveled, and dressed in hospital gown.  The patient is calm, mostly cooperative with the interview, although distracted and requires redirection, maintained appropriate to avoidant eye contact with odd relatedness.  No psychomotor agitation or retardation noted, no abnormal movements.  Steady gait observed.  The patient's speech is more spontaneous, normal in tone and volume.  The patient's mood is "better than yesterday."  Affect is labile, inappropriate.  Thought process is concrete with interview questions, disorganized at times. Thought content notable for minimal spontaneous content, inability to provide history, and wanting to see family. Denies current delusional content.  Denies any suicidal or homicidal ideation, intent, or plan. Denies current AH/VH.  Cognition grossly intact, AAOx3 with prompting. Insight is poor.  Judgment is poor. Impulse control has been fair on the unit.

## 2022-04-22 PROCEDURE — 99213 OFFICE O/P EST LOW 20 MIN: CPT | Mod: 95

## 2022-11-08 NOTE — BH INPATIENT PSYCHIATRY PROGRESS NOTE - NSBHFUPINTERVALHXFT_PSY_A_CORE
Patient’s chart reviewed and case discussed with treatment team. No significant events reported overnight. Upon evaluation with writer, patient refused use of  as he felt comfortable to speak in English;  Patient defines his mood as “fine” and states his sleep and eating patterns are “ok”. Patient reports he is ready for discharge as his initial S/S upon arrival to unit has improved overall. 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 Patient defined his protective factors as family, friends, and video games. Patient encouraged to call 911 and go to ER for any ideations or thoughts to harm self or others as he expressed he had a suicidal gesture in March (exact date unknown) prior to admission of holding shampoo bottle to ingest; in which he self-aborted. Patient verbalized understanding and agreed to follow through. Patient also encouraged and educated to continue adherence to treatment plan outpatient. Patient is compliant with medications and denies any adverse reactions. Morning VSS as per flow. Continue to monitor.  negative

## 2022-11-12 ENCOUNTER — OUTPATIENT (OUTPATIENT)
Dept: OUTPATIENT SERVICES | Facility: HOSPITAL | Age: 22
LOS: 1 days | Discharge: HOME | End: 2022-11-12

## 2022-11-12 DIAGNOSIS — Z98.890 OTHER SPECIFIED POSTPROCEDURAL STATES: Chronic | ICD-10-CM

## 2022-11-12 DIAGNOSIS — F29 UNSPECIFIED PSYCHOSIS NOT DUE TO A SUBSTANCE OR KNOWN PHYSIOLOGICAL CONDITION: ICD-10-CM

## 2023-02-21 ENCOUNTER — INPATIENT (INPATIENT)
Facility: HOSPITAL | Age: 23
LOS: 9 days | Discharge: ANOTHER HOSPITAL | End: 2023-03-03
Attending: PSYCHIATRY & NEUROLOGY | Admitting: PSYCHIATRY & NEUROLOGY
Payer: MEDICAID

## 2023-02-21 VITALS
DIASTOLIC BLOOD PRESSURE: 84 MMHG | SYSTOLIC BLOOD PRESSURE: 133 MMHG | HEART RATE: 94 BPM | RESPIRATION RATE: 18 BRPM | OXYGEN SATURATION: 100 % | TEMPERATURE: 99 F

## 2023-02-21 DIAGNOSIS — Z98.890 OTHER SPECIFIED POSTPROCEDURAL STATES: Chronic | ICD-10-CM

## 2023-02-21 DIAGNOSIS — F29 UNSPECIFIED PSYCHOSIS NOT DUE TO A SUBSTANCE OR KNOWN PHYSIOLOGICAL CONDITION: ICD-10-CM

## 2023-02-21 LAB
ALBUMIN SERPL ELPH-MCNC: 5 G/DL — SIGNIFICANT CHANGE UP (ref 3.3–5)
ALP SERPL-CCNC: 75 U/L — SIGNIFICANT CHANGE UP (ref 40–120)
ALT FLD-CCNC: 102 U/L — HIGH (ref 4–41)
ANION GAP SERPL CALC-SCNC: 12 MMOL/L — SIGNIFICANT CHANGE UP (ref 7–14)
APAP SERPL-MCNC: <10 UG/ML — LOW (ref 15–25)
APPEARANCE UR: CLEAR — SIGNIFICANT CHANGE UP
AST SERPL-CCNC: 67 U/L — HIGH (ref 4–40)
BASOPHILS # BLD AUTO: 0.02 K/UL — SIGNIFICANT CHANGE UP (ref 0–0.2)
BASOPHILS NFR BLD AUTO: 0.3 % — SIGNIFICANT CHANGE UP (ref 0–2)
BILIRUB SERPL-MCNC: 0.6 MG/DL — SIGNIFICANT CHANGE UP (ref 0.2–1.2)
BILIRUB UR-MCNC: NEGATIVE — SIGNIFICANT CHANGE UP
BUN SERPL-MCNC: 8 MG/DL — SIGNIFICANT CHANGE UP (ref 7–23)
CALCIUM SERPL-MCNC: 10.2 MG/DL — SIGNIFICANT CHANGE UP (ref 8.4–10.5)
CHLORIDE SERPL-SCNC: 103 MMOL/L — SIGNIFICANT CHANGE UP (ref 98–107)
CO2 SERPL-SCNC: 23 MMOL/L — SIGNIFICANT CHANGE UP (ref 22–31)
COLOR SPEC: COLORLESS — SIGNIFICANT CHANGE UP
COVID-19 SPIKE DOMAIN AB INTERP: POSITIVE
COVID-19 SPIKE DOMAIN ANTIBODY RESULT: >250 U/ML — HIGH
CREAT SERPL-MCNC: 0.94 MG/DL — SIGNIFICANT CHANGE UP (ref 0.5–1.3)
DIFF PNL FLD: NEGATIVE — SIGNIFICANT CHANGE UP
EGFR: 116 ML/MIN/1.73M2 — SIGNIFICANT CHANGE UP
EOSINOPHIL # BLD AUTO: 0.06 K/UL — SIGNIFICANT CHANGE UP (ref 0–0.5)
EOSINOPHIL NFR BLD AUTO: 0.9 % — SIGNIFICANT CHANGE UP (ref 0–6)
ETHANOL SERPL-MCNC: <10 MG/DL — SIGNIFICANT CHANGE UP
FLUAV AG NPH QL: SIGNIFICANT CHANGE UP
FLUBV AG NPH QL: SIGNIFICANT CHANGE UP
GLUCOSE SERPL-MCNC: 109 MG/DL — HIGH (ref 70–99)
GLUCOSE UR QL: NEGATIVE — SIGNIFICANT CHANGE UP
HCT VFR BLD CALC: 44.9 % — SIGNIFICANT CHANGE UP (ref 39–50)
HGB BLD-MCNC: 14.4 G/DL — SIGNIFICANT CHANGE UP (ref 13–17)
IANC: 4.46 K/UL — SIGNIFICANT CHANGE UP (ref 1.8–7.4)
IMM GRANULOCYTES NFR BLD AUTO: 0.2 % — SIGNIFICANT CHANGE UP (ref 0–0.9)
KETONES UR-MCNC: NEGATIVE — SIGNIFICANT CHANGE UP
LEUKOCYTE ESTERASE UR-ACNC: NEGATIVE — SIGNIFICANT CHANGE UP
LYMPHOCYTES # BLD AUTO: 1.54 K/UL — SIGNIFICANT CHANGE UP (ref 1–3.3)
LYMPHOCYTES # BLD AUTO: 23.7 % — SIGNIFICANT CHANGE UP (ref 13–44)
MCHC RBC-ENTMCNC: 25.9 PG — LOW (ref 27–34)
MCHC RBC-ENTMCNC: 32.1 GM/DL — SIGNIFICANT CHANGE UP (ref 32–36)
MCV RBC AUTO: 80.9 FL — SIGNIFICANT CHANGE UP (ref 80–100)
MONOCYTES # BLD AUTO: 0.42 K/UL — SIGNIFICANT CHANGE UP (ref 0–0.9)
MONOCYTES NFR BLD AUTO: 6.5 % — SIGNIFICANT CHANGE UP (ref 2–14)
NEUTROPHILS # BLD AUTO: 4.46 K/UL — SIGNIFICANT CHANGE UP (ref 1.8–7.4)
NEUTROPHILS NFR BLD AUTO: 68.4 % — SIGNIFICANT CHANGE UP (ref 43–77)
NITRITE UR-MCNC: NEGATIVE — SIGNIFICANT CHANGE UP
NRBC # BLD: 0 /100 WBCS — SIGNIFICANT CHANGE UP (ref 0–0)
NRBC # FLD: 0 K/UL — SIGNIFICANT CHANGE UP (ref 0–0)
PCP SPEC-MCNC: SIGNIFICANT CHANGE UP
PH UR: 7 — SIGNIFICANT CHANGE UP (ref 5–8)
PLATELET # BLD AUTO: 228 K/UL — SIGNIFICANT CHANGE UP (ref 150–400)
POTASSIUM SERPL-MCNC: 4.5 MMOL/L — SIGNIFICANT CHANGE UP (ref 3.5–5.3)
POTASSIUM SERPL-SCNC: 4.5 MMOL/L — SIGNIFICANT CHANGE UP (ref 3.5–5.3)
PROT SERPL-MCNC: 7.8 G/DL — SIGNIFICANT CHANGE UP (ref 6–8.3)
PROT UR-MCNC: NEGATIVE — SIGNIFICANT CHANGE UP
RBC # BLD: 5.55 M/UL — SIGNIFICANT CHANGE UP (ref 4.2–5.8)
RBC # FLD: 12.9 % — SIGNIFICANT CHANGE UP (ref 10.3–14.5)
RSV RNA NPH QL NAA+NON-PROBE: SIGNIFICANT CHANGE UP
SALICYLATES SERPL-MCNC: <0.3 MG/DL — LOW (ref 15–30)
SARS-COV-2 IGG+IGM SERPL QL IA: >250 U/ML — HIGH
SARS-COV-2 IGG+IGM SERPL QL IA: POSITIVE
SARS-COV-2 RNA SPEC QL NAA+PROBE: SIGNIFICANT CHANGE UP
SODIUM SERPL-SCNC: 138 MMOL/L — SIGNIFICANT CHANGE UP (ref 135–145)
SP GR SPEC: 1 — LOW (ref 1.01–1.05)
TSH SERPL-MCNC: 0.68 UIU/ML — SIGNIFICANT CHANGE UP (ref 0.27–4.2)
UROBILINOGEN FLD QL: SIGNIFICANT CHANGE UP
WBC # BLD: 6.51 K/UL — SIGNIFICANT CHANGE UP (ref 3.8–10.5)
WBC # FLD AUTO: 6.51 K/UL — SIGNIFICANT CHANGE UP (ref 3.8–10.5)

## 2023-02-21 PROCEDURE — 99285 EMERGENCY DEPT VISIT HI MDM: CPT

## 2023-02-21 RX ORDER — HALOPERIDOL DECANOATE 100 MG/ML
5 INJECTION INTRAMUSCULAR ONCE
Refills: 0 | Status: DISCONTINUED | OUTPATIENT
Start: 2023-02-21 | End: 2023-03-03

## 2023-02-21 RX ORDER — HALOPERIDOL DECANOATE 100 MG/ML
5 INJECTION INTRAMUSCULAR EVERY 6 HOURS
Refills: 0 | Status: DISCONTINUED | OUTPATIENT
Start: 2023-02-21 | End: 2023-03-03

## 2023-02-21 RX ORDER — HALOPERIDOL DECANOATE 100 MG/ML
5 INJECTION INTRAMUSCULAR ONCE
Refills: 0 | Status: COMPLETED | OUTPATIENT
Start: 2023-02-21 | End: 2023-02-21

## 2023-02-21 RX ADMIN — Medication 2 MILLIGRAM(S): at 16:30

## 2023-02-21 RX ADMIN — HALOPERIDOL DECANOATE 5 MILLIGRAM(S): 100 INJECTION INTRAMUSCULAR at 16:30

## 2023-02-21 NOTE — ED BEHAVIORAL HEALTH ASSESSMENT NOTE - FAMILY HISTORY OF PSYCHIATRIC ILLNESS / SUICIDALITY
[facility]  [address] [facility] [place] [address]  Phone: [phone]  Fax: [phone]    Otto Hawley MD        November 10, 2017     Patient: Carl Yi   YOB: 2006   Date of Visit: 11/10/2017       To Whom it May Concern:    Carl Yi was seen in my clinic on 11/10/2017. If you have any questions or concerns, please don't hesitate to call.     Sincerely,         Otto Hawley MD
None known

## 2023-02-21 NOTE — ED PROVIDER NOTE - PROGRESS NOTE DETAILS
DIANNA Michel- slight elevation in AST/ ALT 67/102, at this time denies any abd pain, n, c, physical examination benign, recommendation recheck liver function 3-5 days. Pt at this time waiting for transportation to [facility].

## 2023-02-21 NOTE — ED BEHAVIORAL HEALTH NOTE - BEHAVIORAL HEALTH NOTE
Worker called patient’s mother Dasia ([phone]) with [employer] ([phone]) Gabriela SPEAKING ID#[id number]:    Patient is a 22-year-old male, domiciled with parents and sister, a college student at [employer], psychiatrically hospitalized two years ago for similar presentation, bib accompanied by mother for pt decrease appetite and not sleeping Patient is not currently connected to a psychiatrist and is not on medications. Mother states that the patient smokes marijuana (last use 2-3 days ago), and occasionally drinks alcohol. Mother reports that the patient has not slept in the last 4 days, and someone is telling him something. Patient has been hearing voices and thinks someone is following him. Patient states that everyone keeps rejecting him. Patient also complained of having a headache. Mother states that the patient is not engaging in violent or aggressive behaviors. Patient is not presenting with any SI/HI. Patient similarly had similar symptoms 2 years ago. Patient has no medical issues and has been vaccinated for covid-19. Patient has no current exposure. Mother states that pt has a loss of appetite and she had to force him to eat. Patient has been showering at baseline. No prior SA as per mother. Mother states that the patient starts school on 3/4/2023 and maybe he needs to be on medications. Case discussed with psychiatry. Worker called patient’s mother Dasia ([phone]) with [employer] ([phone]) Gabriela SPEAKING ID#[id number]:    Patient is a 22-year-old male, domiciled with parents and sister, a college student at [employer], psychiatrically hospitalized two years ago for similar presentation, bib accompanied by mother for pt decrease appetite and not sleeping Patient is not currently connected to a psychiatrist and is not on medications. Mother states that the patient smokes marijuana (last use 2-3 days ago), and occasionally drinks alcohol. Mother reports that the patient has not slept in the last 4 days, and someone is telling him something. Patient has been hearing voices and thinks someone is following him. Patient states that everyone keeps rejecting him. Patient also complained of having a headache. Mother states that the patient is not engaging in violent or aggressive behaviors. Patient is not presenting with any SI/HI. Patient similarly had similar symptoms 2 years ago. Patient has no medical issues and has been vaccinated for covid-19. Patient has no current exposure. Mother states that pt has a loss of appetite and she had to force him to eat. Patient has been showering at baseline. No prior SA as per mother. Mother states that the patient starts school on 3/4/2023 and maybe he needs to be on medications. Case discussed with psychiatry.    Worker called patient’s mother Dasia ([phone]) with [employer] ([phone]) Gabriela SPEAKING ID # [id number] and informed of patient admission.

## 2023-02-21 NOTE — ED BEHAVIORAL HEALTH ASSESSMENT NOTE - HPI (INCLUDE ILLNESS QUALITY, SEVERITY, DURATION, TIMING, CONTEXT, MODIFYING FACTORS, ASSOCIATED SIGNS AND SYMPTOMS)
The patient is a 23 yo male, single, noncaregiver, lives with parents, h/o psychosis, 1 past psych admission for psychosis ([facility] in 2021), noncompliant with outpatient treatment, no known suicide attempts, no significant medical history, +history of punching walls in the context of psychiatric decompensation, +cannabis use, brought in by mother for acute psychosis in context of medication noncompliance.     See  note for collateral from mother. In summary, pt hasn't been sleeping for days, has poor po intake, reports paranoia and AH. He is med noncompliant.    On interview, pt is vague, easily distracted, internally preoccupied, with increased speech latency. The patient is a 21 yo male, single, noncaregiver, lives with parents, h/o psychosis, 1 past psych admission for psychosis ([facility] in 2021), noncompliant with outpatient treatment, no known suicide attempts, no significant medical history, +history of punching walls in the context of psychiatric decompensation, +cannabis use, brought in by mother for acute psychosis in context of medication noncompliance.     See  note for collateral from mother. In summary, pt hasn't been sleeping for days, has poor po intake, reports paranoia and AH. He is med noncompliant.    On interview, pt is vague, easily distracted, internally preoccupied, with increased speech latency. Pt doesn't know why he is in the ED. He is able to state that he is hearing voices. He describes the AH as "music." He denies command AH. He denies VH. He feels people might be watching him but he is unable/unwilling to elaborate. He reports "sometimes" having SI. States he most recently had suicidal thoughts yesterday (2/20/23). He denies plan or intent. He denies homicidal or violent ideation, intent, or plan. States he hasn't slept in days but can't explain why. He denies substance use.

## 2023-02-21 NOTE — ED PROVIDER NOTE - OBJECTIVE STATEMENT
22-year-old male brought in by mother for concern of anxiety, insomnia, hallucinations.  Patient has history of psychosis NOS few years ago, not on any regular medication and does not have a formal diagnosis.  Mom reports he has been okay up until 5 days ago, she noticed he started to be acting more anxious, not sleeping, and patient himself is reporting he is hearing voices that are singing to him.  No medical complaints, does endorse using marijuana and tobacco, no other drugs or alcohol.

## 2023-02-21 NOTE — ED ADULT NURSE NOTE - CHIEF COMPLAINT QUOTE
Pt bought in by his mother for co 5  days not eating no sleeping  anxious . Pt states he is hearing voiced currently on  no medication. pt had psychiatric  episode in 2021 was seen in [facility] [place].  { father phone number Yessy  [phone] } mother marlys [phone]

## 2023-02-21 NOTE — ED BEHAVIORAL HEALTH ASSESSMENT NOTE - SUMMARY
The patient is a 21 yo male, single, noncaregiver, lives with parents, h/o psychosis, 1 past psych admission for psychosis ([facility] in 2021), noncompliant with outpatient treatment, no known suicide attempts, no significant medical history, +history of punching walls in the context of psychiatric decompensation, +cannabis use, brought in by mother for acute psychosis in context of medication noncompliance.   Patient hasn't slept in 4 days, has poor po intake, reports AH and paranoia. In ED, pt appears internally preoccupied, exhibits increased speech latency, reports intermittent SI. Pt is unable to care for self at this time and requires inpatient psychiatric hospitalization for safety, symptom stabilization and medication management.

## 2023-02-21 NOTE — ED ADULT NURSE NOTE - NSFALLRSKOUTCOME_ED_ALL_ED
Oma Tyson contacted the office about the patient, and was informed about the recent phone message. She advised the patient has completed the Cipro medication. She also mentioned the patient has been taking the medication Theophylline for numerous years. Nisha Pineda the medication would be added to the patient's chart for future use. She voiced understanding.
Received a medication interaction notice for cipro and theophylline. Theophylline is not on his medication list. Please find out if patient is taking this or not.
noted
Universal Safety Interventions

## 2023-02-21 NOTE — ED ADULT TRIAGE NOTE - CHIEF COMPLAINT QUOTE
Pt bought in by his mother for co 5  days not eating no sleeping  anxious . Pt states he is hearing voiced currently on  no medication. pt had psychiatric  episode in 2021 was seen in [facility] [place].  { father phone number Yessy  [phone] } mother marlys [phone]

## 2023-02-21 NOTE — ED PROVIDER NOTE - CLINICAL SUMMARY MEDICAL DECISION MAKING FREE TEXT BOX
2-year-old male presenting to the ED with anxiety insomnia and hallucinations ongoing for the last 5 days, history of psychosis NOS 2 years ago has not been on any medications since also with regular marijuana and tobacco use.  On evaluation patient is calm cooperative, reporting auditory hallucination and anxiety, no medical complaints, no evidence of intoxication at this time.Plan for labs, psych consult.  Reassess pending initial work-up.

## 2023-02-21 NOTE — ED BEHAVIORAL HEALTH ASSESSMENT NOTE - DETAILS
ABISAI informed mother patient did not endorse any suicidality but exam was limited by his disorganization see HPI

## 2023-02-21 NOTE — BH PATIENT PROFILE - NSVRISKTHREATS_PSY_ALL_CORE
History  Chief Complaint   Patient presents with    Cough     Pt reports cough/headache Irving Pimple  Pt reports covid exposure 5 days ago  Pt reports fully vaccinated for covid      55 y/o female presents to the ER with cough, headache, occasional diarrhea for a few days  She was exposed to someone last week who recently tested positive for covid  She is fully vaccinated for covid  No fevers,  Pt  States that she has a hx of asthma but is not wheezing now  Prior to Admission Medications   Prescriptions Last Dose Informant Patient Reported? Taking? Linaclotide (LINZESS) 67 MCG CAPS   Yes No   Sig: Take by mouth   atenolol-chlorthalidone (TENORETIC) 100-25 mg per tablet   No No   Sig: Take 1 tablet by mouth daily for 14 days   atorvastatin (LIPITOR) 10 mg tablet   Yes No   Sig: Take 10 mg by mouth   calcium polycarbophil (FIBERCON) 625 mg tablet   Yes No   Sig: Take 625 mg by mouth   fluticasone (FLONASE) 50 mcg/act nasal spray   Yes No   Sig: instill 2 sprays into each nostril once daily   lisinopril (ZESTRIL) 5 mg tablet   Yes No   Sig: Take 5 mg by mouth   metFORMIN (GLUCOPHAGE) 500 mg tablet   Yes No   Sig: Take 500 mg by mouth 2 (two) times a day   naproxen (NAPROSYN) 500 mg tablet   No No   Sig: Take 1 tablet (500 mg total) by mouth 2 (two) times a day with meals for 5 days   omeprazole (PRILOSEC) 20 mg delayed release capsule   Yes No   Sig: Take 20 mg by mouth      Facility-Administered Medications: None       Past Medical History:   Diagnosis Date    Diabetes mellitus ([facility] [address] )     Hyperlipidemia     Hypertension        Past Surgical History:   Procedure Laterality Date     SECTION      HYSTERECTOMY         No family history on file  I have reviewed and agree with the history as documented      E-Cigarette/Vaping     E-Cigarette/Vaping Substances     Social History     Tobacco Use    Smoking status: Never Smoker    Smokeless tobacco: Never Used   Substance Use Topics    Alcohol use: No    Drug use: No       Review of Systems   Constitutional: Negative for appetite change, fatigue and fever  HENT: Negative for rhinorrhea and sore throat  Eyes: Negative for pain  Respiratory: Positive for cough  Negative for shortness of breath and wheezing  Cardiovascular: Negative for chest pain and leg swelling  Gastrointestinal: Positive for diarrhea  Negative for abdominal pain, nausea and vomiting  Genitourinary: Negative for dysuria and flank pain  Musculoskeletal: Negative for back pain and neck pain  Skin: Negative for rash  Neurological: Positive for headaches  Negative for syncope  Psychiatric/Behavioral:        Mood normal       Physical Exam  Physical Exam  Vitals and nursing note reviewed  Constitutional:       Appearance: She is well-developed  HENT:      Head: Normocephalic and atraumatic  Eyes:      Pupils: Pupils are equal, round, and reactive to light  Cardiovascular:      Rate and Rhythm: Normal rate and regular rhythm  Pulmonary:      Effort: Pulmonary effort is normal  No respiratory distress  Breath sounds: Normal breath sounds  No wheezing  Abdominal:      Palpations: Abdomen is soft  Tenderness: There is no abdominal tenderness  Musculoskeletal:         General: Normal range of motion  Cervical back: Normal range of motion and neck supple  Skin:     General: Skin is warm and dry  Neurological:      General: No focal deficit present  Mental Status: She is alert and oriented to person, place, and time           Vital Signs  ED Triage Vitals   Temperature Pulse Respirations Blood Pressure SpO2   06/15/21 1544 06/15/21 1523 06/15/21 1523 06/15/21 1523 06/15/21 1523   98 8 °F (37 1 °C) 89 18 140/83 97 %      Temp Source Heart Rate Source Patient Position - Orthostatic VS BP Location FiO2 (%)   06/15/21 1544 06/15/21 1523 06/15/21 1523 06/15/21 1523 --   Oral Monitor Sitting Right arm       Pain Score       06/15/21 1606       8 Vitals:    06/15/21 1523 06/15/21 1641   BP: 140/83 102/52   Pulse: 89 83   Patient Position - Orthostatic VS: Sitting Sitting         Visual Acuity      ED Medications  Medications   ketorolac (TORADOL) injection 30 mg (30 mg Intramuscular Given 6/15/21 1606)   albuterol (PROVENTIL HFA,VENTOLIN HFA) inhaler 2 puff (2 puffs Inhalation Given 6/15/21 1607)       Diagnostic Studies  Results Reviewed     Procedure Component Value Units Date/Time    Novel Coronavirus (Covid-19),PCR [facility] - 24 Hour Routine [[id number]]  (Normal) Collected: 06/15/21 1606    Lab Status: Final result Specimen: Nares from Nasopharyngeal Swab Updated: 06/15/21 1708     SARS-CoV-2 Negative    Narrative: The specimen collection materials, transport medium, and/or testing methodology utilized in the production of these test results have been proven to be reliable in a limited validation with an abbreviated program under the Emergency Utilization Authorization provided by the FDA  Testing reported as "Presumptive positive" will be confirmed with secondary testing to ensure result accuracy  Clinical caution and judgement should be used with the interpretation of these results with consideration of the clinical impression and other laboratory testing  Testing reported as "Positive" or "Negative" has been proven to be accurate according to standard laboratory validation requirements  All testing is performed with control materials showing appropriate reactivity at standard intervals  XR chest 1 view portable   Final Result by Omar Flores MD (06/16 2307)   No acute cardiopulmonary disease        Findings are stable            Workstation performed: [id number]                    Procedures  ECG 12 Lead Documentation Only    Date/Time: 6/15/2021 4:26 PM  Performed by: Lisa Cohen MD  Authorized by: Lisa Cohen MD     Rate:     ECG rate:  81    ECG rate assessment: normal    Rhythm: Rhythm: sinus rhythm    Comments:      No st elevation or depression             ED Course                             SBIRT 20yo+      Most Recent Value   SBIRT (22 yo +)   In order to provide better care to our patients, we are screening all of our patients for alcohol and drug use  Would it be okay to ask you these screening questions? Unable to answer at this time Filed at: 06/15/2021 1549                    [facility]  Number of Diagnoses or Management Options     Amount and/or Complexity of Data Reviewed  Tests in the radiology section of CPT®: ordered and reviewed    Risk of Complications, Morbidity, and/or Mortality  Presenting problems: moderate  General comments: Pt  Is stable for outpt  Follow up        Disposition  Final diagnoses:   URI (upper respiratory infection)     Time reflects when diagnosis was documented in both MDM as applicable and the Disposition within this note     Time User Action Codes Description Comment    6/15/2021  4:38 PM Diego Perla Add [J06 9] URI (upper respiratory infection)       ED Disposition     ED Disposition Condition Date/Time Comment    Discharge Stable Tue Kevin 15, 2021  4:38 PM Davon Gao discharge to home/self care              Follow-up Information     Follow up With Specialties Details Why Contact Info    Cori Waite MD Internal Medicine   [facility] [address]  [phone]            Discharge Medication List as of 6/15/2021  4:39 PM      CONTINUE these medications which have NOT CHANGED    Details   atenolol-chlorthalidone (TENORETIC) 100-25 mg per tablet Take 1 tablet by mouth daily for 14 days, Starting Mon 6/12/2017, Until Mon 6/26/2017, Print      atorvastatin (LIPITOR) 10 mg tablet Take 10 mg by mouth, Starting Wed 4/25/2018, Until Thu 4/25/2019, Historical Med      calcium polycarbophil (FIBERCON) 625 mg tablet Take 625 mg by mouth, Starting Wed 4/25/2018, Until Thu 4/25/2019, Historical Med      fluticasone (FLONASE) 50 mcg/act nasal spray instill 2 sprays into each nostril once daily, Historical Med      Linaclotide (LINZESS) 72 MCG CAPS Take by mouth, Historical Med      lisinopril (ZESTRIL) 5 mg tablet Take 5 mg by mouth, Starting Wed 4/25/2018, Until Thu 4/25/2019, Historical Med      metFORMIN (GLUCOPHAGE) 500 mg tablet Take 500 mg by mouth 2 (two) times a day, Historical Med      naproxen (NAPROSYN) 500 mg tablet Take 1 tablet (500 mg total) by mouth 2 (two) times a day with meals for 5 days, Starting Sun 9/23/2018, Until Fri 9/28/2018, Print      omeprazole (PRILOSEC) 20 mg delayed release capsule Take 20 mg by mouth, Starting Wed 4/25/2018, Until Thu 4/25/2019, Historical Med           No discharge procedures on file      PDMP Review     None          ED Provider  Electronically Signed by           Arie Crowe MD  06/15/21 y [address] Ravi Ivey MD  06/16/21 1429 No

## 2023-02-21 NOTE — ED BEHAVIORAL HEALTH ASSESSMENT NOTE - DESCRIPTION
patient lives at home with his mother and father none known in behavioral control  Vital Signs Last 24 Hrs  T(C): 37.1 (21 Feb 2023 10:52), Max: 37.1 (21 Feb 2023 10:52)  T(F): 98.7 (21 Feb 2023 10:52), Max: 98.7 (21 Feb 2023 10:52)  HR: 94 (21 Feb 2023 10:52) (94 - 94)  BP: 133/84 (21 Feb 2023 10:52) (133/84 - 133/84)  BP(mean): --  RR: 18 (21 Feb 2023 10:52) (18 - 18)  SpO2: 100% (21 Feb 2023 10:52) (100% - 100%)    Parameters below as of 21 Feb 2023 10:52  Patient On (Oxygen Delivery Method): room air

## 2023-02-21 NOTE — ED BEHAVIORAL HEALTH ASSESSMENT NOTE - PSYCHIATRIC ISSUES AND PLAN (INCLUDE STANDING AND PRN MEDICATION)
Defer standing medication/anti psychotic to primary team; use Haldol 5 mg, Ativan 2 mg PO/IM prn agitation

## 2023-02-21 NOTE — ED ADULT NURSE NOTE - OBJECTIVE STATEMENT
Pt bought in by his mother for co 5  days not eating no sleeping  anxious . Pt states he is hearing voiced currently on  no medication. pt had psychiatric  episode in 2021 was seen in [facility] [place].

## 2023-02-21 NOTE — ED PROVIDER NOTE - PHYSICAL EXAMINATION
GEN - NAD; well appearing; A+O x3   HEAD - NC/AT   EYES- PERRL, EOMI  ENT: Airway patent, mmm, Oral cavity and pharynx normal. No inflammation, swelling, exudate, or lesions.    NECK: Neck supple  PULMONARY - CTA b/l, symmetric breath sounds.   CARDIAC -s1s2, RRR, no M,G,R  ABDOMEN - +BS, ND, NT, soft, no guarding  BACK - no CVA tenderness   EXTREMITIES - FROM, no edema   SKIN - no rash or bruising   NEUROLOGIC - alert, speech clear, no focal deficits  PSYCH -calm, cooperative, reporting aud ying and anxiety, no si/hi,

## 2023-02-21 NOTE — ED PROVIDER NOTE - NS ED ATTENDING STATEMENT MOD
This was a shared visit with the EDUARD. I reviewed and verified the documentation and independently performed the documented:

## 2023-02-21 NOTE — ED BEHAVIORAL HEALTH ASSESSMENT NOTE - NSSUICRSKFACTOR_PSY_ALL_CORE
Unable to assess Current and Past Psychiatric Diagnoses/Presenting Symptoms/Treatment Related Factors

## 2023-02-21 NOTE — ED BEHAVIORAL HEALTH ASSESSMENT NOTE - VIOLENCE RISK FACTORS:
Lack of insight into violence risk/need for treatment Feeling of being under threat and being unable to control threat/Lack of insight into violence risk/need for treatment/Noncompliance with treatment

## 2023-02-22 DIAGNOSIS — F12.10 CANNABIS ABUSE, UNCOMPLICATED: ICD-10-CM

## 2023-02-22 LAB
COVID-19 SPIKE DOMAIN AB INTERP: POSITIVE
COVID-19 SPIKE DOMAIN ANTIBODY RESULT: >250 U/ML — HIGH
SARS-COV-2 IGG+IGM SERPL QL IA: >250 U/ML — HIGH
SARS-COV-2 IGG+IGM SERPL QL IA: POSITIVE

## 2023-02-22 PROCEDURE — 99223 1ST HOSP IP/OBS HIGH 75: CPT

## 2023-02-22 RX ORDER — RISPERIDONE 4 MG/1
1 TABLET ORAL AT BEDTIME
Refills: 0 | Status: DISCONTINUED | OUTPATIENT
Start: 2023-02-22 | End: 2023-02-26

## 2023-02-22 RX ORDER — LANOLIN ALCOHOL/MO/W.PET/CERES
3 CREAM (GRAM) TOPICAL AT BEDTIME
Refills: 0 | Status: DISCONTINUED | OUTPATIENT
Start: 2023-02-22 | End: 2023-03-03

## 2023-02-22 RX ORDER — DIPHENHYDRAMINE HCL 50 MG
50 CAPSULE ORAL EVERY 6 HOURS
Refills: 0 | Status: DISCONTINUED | OUTPATIENT
Start: 2023-02-22 | End: 2023-03-03

## 2023-02-22 RX ADMIN — RISPERIDONE 1 MILLIGRAM(S): 4 TABLET ORAL at 21:23

## 2023-02-22 RX ADMIN — Medication 3 MILLIGRAM(S): at 23:21

## 2023-02-22 NOTE — BH INPATIENT PSYCHIATRY ASSESSMENT NOTE - NSBHASSESSSUMMFT_PSY_ALL_CORE
Patient is a 23 yo male, single, noncaregiver, domiciled with parents, unemployed, enrolled into [employer], with PPH of psychosis, 1 past psych admission for psychosis ([facility] in 2021), noncompliant with outpatient treatment, no known suicide attempts, no significant medical history, +history of punching walls in the context of psychiatric decompensation, +cannabis use, brought in by mother for acute psychosis in context of medication noncompliance. Patient was admitted to [facility] on 9.39 legal status. He is currently a danger to self, is unable to fend for self in the community and meets criteria for inpatient hospitalization.      Working Diagnoses:  Psychosis, unspecified  Cannabis use disorder  R/o PTSD, SAD, bipolar type    Plan:  >Legal: 9.13  >Obs: Routine observation, low risk for suicide and is able to engage in safety planning    >Psychiatric Meds:  Start Risperidone 1 mg, HS for psychosis/mood    PRN medications:  Lorazepam 2 mg, Q6H PRN for agitation/anxiety  Lorazepam 2 mg IM, once PRN for agitation.  Haldol 5 mg IM, once PRN for agitation  Haldol 5 mg PO, Q6H PRN for agitation  Benadryl 50 mg PO, Q6H PRN for agitation  Benadryl 50 mg IM, once PRN for agitation    >Labs: Admission labs reviewed, no acute findings. Hold antipsychotics if QTc >500  >Medical: No acute concerns. No consultations needed at this time. No indication for CIWA. Patient with stable VS, no visible physical symptoms of withdrawal. During the course of treatment, will collaborate with medical team to manage medical issues.  >Diet: Regular  >Social: Milieu/structured therapy  >Treatment Interventions: Groups and Individual Therapy/CBT.  >Dispo: Collateral and dispo planning pending further symptom and medication optimization

## 2023-02-22 NOTE — BH SOCIAL WORK INITIAL PSYCHOSOCIAL EVALUATION - OTHER PAST PSYCHIATRIC HISTORY (INCLUDE DETAILS REGARDING ONSET, COURSE OF ILLNESS, INPATIENT/OUTPATIENT TREATMENT)
Per clinicals, patient is a 23 yo male, single, noncaregiver, lives with parents, h/o psychosis, 1 past psych admission for psychosis ([facility] in 2021), noncompliant with outpatient treatment, no known suicide attempts, no significant medical history, +history of punching walls in the context of psychiatric decompensation, +cannabis use, brought in by mother for acute psychosis in context of medication noncompliance. Pt is pleasant upon approach but endorses AH.     Medicaid ID: [id number], [place]: [id number]

## 2023-02-22 NOTE — DIETITIAN INITIAL EVALUATION ADULT - OTHER INFO
Patient is a 21 y/o male with PPH of psychosis, 1 past psych admission for psychosis ([facility] in 2021), noncompliant with outpatient treatment, no known suicide attempts, no significant medical history, +history of punching walls in the context of psychiatric decompensation, +cannabis use, brought in by mother for acute psychosis in context of medication noncompliance.  Met with patient in the day room area today who was able to engage in the interview. Patient reports current appetite remains poor today but denies chewing/swallowing difficulties on current diet. Patient endorses + nausea and vomited "some" undigested food this morning after he had his breakfast likely due to anxiety, denies blood in emesis. Patient reports nausea is "on and off" but is better at time of visit. No specific food that triggers N/V per patient. Patient continues to prefer Halal meals, also wants grilled chicken w/ rice and veg chili w/ rice every other day. Food preferences taken and implemented on Cboard. Otherwise, patient denies C/D. Patient uncertain about his UBW/recent wt status. Writer encouraged po intake as tolerated, patient amenable to try Orgain to optimize his po intake.

## 2023-02-22 NOTE — BH INPATIENT PSYCHIATRY ASSESSMENT NOTE - CURRENT MEDICATION
MEDICATIONS  (STANDING):    MEDICATIONS  (PRN):  haloperidol     Tablet 5 milliGRAM(s) Oral every 6 hours PRN agitation  haloperidol    Injectable 5 milliGRAM(s) IntraMuscular Once PRN severe agitation  LORazepam     Tablet 2 milliGRAM(s) Oral every 6 hours PRN Agitation  LORazepam   Injectable 2 milliGRAM(s) IntraMuscular Once PRN severe agitation   MEDICATIONS  (STANDING):  risperiDONE  Disintegrating Tablet 1 milliGRAM(s) Oral at bedtime    MEDICATIONS  (PRN):  haloperidol     Tablet 5 milliGRAM(s) Oral every 6 hours PRN agitation  haloperidol    Injectable 5 milliGRAM(s) IntraMuscular Once PRN severe agitation  LORazepam     Tablet 2 milliGRAM(s) Oral every 6 hours PRN Agitation  LORazepam   Injectable 2 milliGRAM(s) IntraMuscular Once PRN severe agitation

## 2023-02-22 NOTE — DIETITIAN INITIAL EVALUATION ADULT - PERTINENT MEDS FT
MEDICATIONS  (STANDING):  risperiDONE  Disintegrating Tablet 1 milliGRAM(s) Oral at bedtime    MEDICATIONS  (PRN):  haloperidol     Tablet 5 milliGRAM(s) Oral every 6 hours PRN agitation  haloperidol    Injectable 5 milliGRAM(s) IntraMuscular Once PRN severe agitation  LORazepam     Tablet 2 milliGRAM(s) Oral every 6 hours PRN Agitation  LORazepam   Injectable 2 milliGRAM(s) IntraMuscular Once PRN severe agitation

## 2023-02-22 NOTE — DIETITIAN INITIAL EVALUATION ADULT - ORAL INTAKE PTA/DIET HISTORY
Per chart, patient with decreased appetite for 5 days PTA and mother has to force him to eat. Per patient, his appetite has been poor for the past few days at home and was not on any vitamins/po nutrition supplements PTA. Patient is on Halal diet at home, and does not eat beef/pork. NKFA reported.

## 2023-02-22 NOTE — BH INPATIENT PSYCHIATRY ASSESSMENT NOTE - HPI (INCLUDE ILLNESS QUALITY, SEVERITY, DURATION, TIMING, CONTEXT, MODIFYING FACTORS, ASSOCIATED SIGNS AND SYMPTOMS)
Patient is a 21 yo male, single, noncaregiver, domiciled with parents, unemployed, enrolled into [employer], with PPH of psychosis, 1 past psych admission for psychosis ([facility] in 2021), noncompliant with outpatient treatment, no known suicide attempts, no significant medical history, +history of punching walls in the context of psychiatric decompensation, +cannabis use, brought in by mother for acute psychosis in context of medication noncompliance.     As per ED behavioral health assessment note:  "On interview, pt is vague, easily distracted, internally preoccupied, with increased speech latency. Pt doesn't know why he is in the ED. He is able to state that he is hearing voices. He describes the AH as "music." He denies command AH. He denies VH. He feels people might be watching him but he is unable/unwilling to elaborate. He reports "sometimes" having SI. States he most recently had suicidal thoughts yesterday (2/20/23). He denies plan or intent. He denies homicidal or violent ideation, intent, or plan. States he hasn't slept in days but can't explain why. He denies substance use."    As per ED Behavioral Health note:   "Patient is a 22-year-old male, domiciled with parents and sister, a college student at [employer], psychiatrically hospitalized two years ago for similar presentation, bib accompanied by mother for pt decrease appetite and not sleeping Patient is not currently connected to a psychiatrist and is not on medications. Mother states that the patient smokes marijuana (last use 2-3 days ago), and occasionally drinks alcohol. Mother reports that the patient has not slept in the last 4 days, and someone is telling him something. Patient has been hearing voices and thinks someone is following him. Patient states that everyone keeps rejecting him. Patient also complained of having a headache. Mother states that the patient is not engaging in violent or aggressive behaviors. Patient is not presenting with any SI/HI. Patient similarly had similar symptoms 2 years ago. Patient has no medical issues and has been vaccinated for covid-19. Patient has no current exposure. Mother states that pt has a loss of appetite and she had to force him to eat. Patient has been showering at baseline. No prior SA as per mother. Mother states that the patient starts school on 3/4/2023 and maybe he needs to be on medications. Case discussed with psychiatry."    On unit:    Patient is a 21 yo male, single, noncaregiver, domiciled with parents, unemployed, enrolled into [facility] [employer], with PPH of psychosis, 1 past psych admission for psychosis ([facility] in 2021), noncompliant with outpatient treatment, no known suicide attempts, no significant medical history, +history of punching walls in the context of psychiatric decompensation, +cannabis use, brought in by mother for acute psychosis in context of medication noncompliance.     As per ED behavioral health assessment note:  "On interview, pt is vague, easily distracted, internally preoccupied, with increased speech latency. Pt doesn't know why he is in the ED. He is able to state that he is hearing voices. He describes the AH as "music." He denies command AH. He denies VH. He feels people might be watching him but he is unable/unwilling to elaborate. He reports "sometimes" having SI. States he most recently had suicidal thoughts yesterday (2/20/23). He denies plan or intent. He denies homicidal or violent ideation, intent, or plan. States he hasn't slept in days but can't explain why. He denies substance use."    As per ED Behavioral Health note:   "Patient is a 22-year-old male, domiciled with parents and sister, a college student at [employer], psychiatrically hospitalized two years ago for similar presentation, bib accompanied by mother for pt decrease appetite and not sleeping Patient is not currently connected to a psychiatrist and is not on medications. Mother states that the patient smokes marijuana (last use 2-3 days ago), and occasionally drinks alcohol. Mother reports that the patient has not slept in the last 4 days, and someone is telling him something. Patient has been hearing voices and thinks someone is following him. Patient states that everyone keeps rejecting him. Patient also complained of having a headache. Mother states that the patient is not engaging in violent or aggressive behaviors. Patient is not presenting with any SI/HI. Patient similarly had similar symptoms 2 years ago. Patient has no medical issues and has been vaccinated for covid-19. Patient has no current exposure. Mother states that pt has a loss of appetite and she had to force him to eat. Patient has been showering at baseline. No prior SA as per mother. Mother states that the patient starts school on 3/4/2023 and maybe he needs to be on medications. Case discussed with psychiatry."     On unit:  Patient was seen and is evaluated on the unit. He is disorganized in TP, vague, internally preoccupied and thought blocked, otherwise is calm and cooperative with interview. He states that people around him have been following him. When further questioned about this he verbalizes that he does not know who is following him and what their intentions are. Does express "maybe they want to save me from something." He reports having trouble sleeping for the past 3 days, unable to quantify how much sleep he has been getting. Patient does verbalize being able to sleep on the unit last night. He reports that he spends most of his day playing video games, often into the night. Patient also reports having poor appetite over the past week. He states that he is enrolled at [employer] and will be starting classes next month, majoring in computer science. Patient is difficult to follow at times, answering questions with illogical statements. He does verbalize being noncompliant with treatment after last hospitalization. States that he self-discontinued as the medications made him feel "dizzy and tired".  Patient is a 21 yo male, single, noncaregiver, domiciled with parents, unemployed, enrolled into [employer], with PPH of psychosis, 1 past psych admission for psychosis ([facility] in 2021), noncompliant with outpatient treatment, no known suicide attempts, no significant medical history, +history of punching walls in the context of psychiatric decompensation, +cannabis use, brought in by mother for acute psychosis in context of medication noncompliance.     As per ED behavioral health assessment note:  "On interview, pt is vague, easily distracted, internally preoccupied, with increased speech latency. Pt doesn't know why he is in the ED. He is able to state that he is hearing voices. He describes the AH as "music." He denies command AH. He denies VH. He feels people might be watching him but he is unable/unwilling to elaborate. He reports "sometimes" having SI. States he most recently had suicidal thoughts yesterday (2/20/23). He denies plan or intent. He denies homicidal or violent ideation, intent, or plan. States he hasn't slept in days but can't explain why. He denies substance use."    As per ED Behavioral Health note:   "Patient is a 22-year-old male, domiciled with parents and sister, a college student at [employer], psychiatrically hospitalized two years ago for similar presentation, bib accompanied by mother for pt decrease appetite and not sleeping Patient is not currently connected to a psychiatrist and is not on medications. Mother states that the patient smokes marijuana (last use 2-3 days ago), and occasionally drinks alcohol. Mother reports that the patient has not slept in the last 4 days, and someone is telling him something. Patient has been hearing voices and thinks someone is following him. Patient states that everyone keeps rejecting him. Patient also complained of having a headache. Mother states that the patient is not engaging in violent or aggressive behaviors. Patient is not presenting with any SI/HI. Patient similarly had similar symptoms 2 years ago. Patient has no medical issues and has been vaccinated for covid-19. Patient has no current exposure. Mother states that pt has a loss of appetite and she had to force him to eat. Patient has been showering at baseline. No prior SA as per mother. Mother states that the patient starts school on 3/4/2023 and maybe he needs to be on medications. Case discussed with psychiatry."       On unit:  Patient was seen and is evaluated on the unit. He is disorganized in TP, vague, internally preoccupied and thought blocked, otherwise is calm and cooperative with interview. He states that people around him have been following him. When further questioned about this he verbalizes that he does not know who is following him and what their intentions are. Does express "maybe they want to save me from something." He reports having trouble sleeping for the past 3 days, unable to quantify how much sleep he has been getting. Patient does verbalize being able to sleep on the unit last night. He reports that he spends most of his day playing video games, often into the night. Patient also reports having poor appetite over the past week. He states that he is enrolled at [employer] and will be starting classes next month, majoring in [employer] science. Patient is difficult to follow at times, answering questions with illogical statements. He does verbalize being noncompliant with treatment after last hospitalization. States that he self-discontinued as the medications made him feel "dizzy and tired". Patient denies any SIB/SI plan or intent, no prior hx of NSSIB or SA. Of note, patient did endorse SI while he was in the ED. He denies any HI, plan or intent, has prior hx of aggressive behavior towards property (punching walls). Patient denies psychotic symptoms such as VH, magical thinking, thought insertion/broadcasting, ideas of reference. He does endorse paranoia and AH, states that he can hear voices that tell him "bad words" in English and Greek (native tongue), he denies any CAH. Patient denies symptoms of depression such as dysphoric mood, suicidal ideation, anhedonia, anergia, social isolation, crying spells, feelings of hopelessness/helplessness/worthlessness/guilt. He reports depressive symptoms such as disturbances in sleep and appetite. He denies symptoms associated with bethany such as insomnia with increase in goal-directed activities, racing thoughts, mood lability, tangentiality, distractibility, and impulsivity. Patient denies any hx of sexual/physical/emotional abuse. Denies symptoms associated with PTSD such as nightmares, hypervigilance, dissociation, and flashbacks. He denies drinking alcohol, reports hx of cannabis use approx. 1-2 joints EOD, he denies any cigarette use. No prior admissions into substance rehab/detox. Patient is a 23 yo male, single, noncaregiver, domiciled with parents, unemployed, enrolled into [employer] [employer], with PPH of psychosis, 1 past psych admission for psychosis ([place] in 2021), noncompliant with outpatient treatment, no known suicide attempts, no significant medical history, +history of punching walls in the context of psychiatric decompensation, +cannabis use, brought in by mother for acute psychosis in context of medication noncompliance.     As per ED behavioral health assessment note:  "On interview, pt is vague, easily distracted, internally preoccupied, with increased speech latency. Pt doesn't know why he is in the ED. He is able to state that he is hearing voices. He describes the AH as "music." He denies command AH. He denies VH. He feels people might be watching him but he is unable/unwilling to elaborate. He reports "sometimes" having SI. States he most recently had suicidal thoughts yesterday (2/20/23). He denies plan or intent. He denies homicidal or violent ideation, intent, or plan. States he hasn't slept in days but can't explain why. He denies substance use."    As per ED Behavioral Health note:   "Patient is a 22-year-old male, domiciled with parents and sister, a college student at [employer], psychiatrically hospitalized two years ago for similar presentation, bib accompanied by mother for pt decrease appetite and not sleeping Patient is not currently connected to a psychiatrist and is not on medications. Mother states that the patient smokes marijuana (last use 2-3 days ago), and occasionally drinks alcohol. Mother reports that the patient has not slept in the last 4 days, and someone is telling him something. Patient has been hearing voices and thinks someone is following him. Patient states that everyone keeps rejecting him. Patient also complained of having a headache. Mother states that the patient is not engaging in violent or aggressive behaviors. Patient is not presenting with any SI/HI. Patient similarly had similar symptoms 2 years ago. Patient has no medical issues and has been vaccinated for covid-19. Patient has no current exposure. Mother states that pt has a loss of appetite and she had to force him to eat. Patient has been showering at baseline. No prior SA as per mother. Mother states that the patient starts school on 3/4/2023 and maybe he needs to be on medications. Case discussed with psychiatry."       On unit:  Patient was seen and is evaluated on the unit. He is disorganized in TP, vague, internally preoccupied and thought blocked, otherwise is calm and cooperative with interview. He states that people around him have been following him. When further questioned about this he verbalizes that he does not know who is following him and what their intentions are. Does express "maybe they want to save me from something." He reports having trouble sleeping for the past 3 days, unable to quantify how much sleep he has been getting. Patient does verbalize being able to sleep on the unit last night. He reports that he spends most of his day playing video games, often into the night. Patient also reports having poor appetite over the past week. He states that he is enrolled at [employer] and will be starting classes next month, majoring in [employer] science. Patient is difficult to follow at times, answering questions with illogical statements. He does verbalize being noncompliant with treatment after last hospitalization. States that he self-discontinued as the medications made him feel "dizzy and tired". Patient denies any SIB/SI plan or intent, no prior hx of NSSIB or SA. Of note, patient did endorse SI while he was in the ED. He denies any HI, plan or intent, has prior hx of aggressive behavior towards property (punching walls). Patient denies psychotic symptoms such as VH, magical thinking, thought insertion/broadcasting, ideas of reference. He does endorse paranoia and AH, states that he can hear voices that tell him "bad words" in English and Danish (native tongue), he denies any CAH. Patient denies symptoms of depression such as dysphoric mood, suicidal ideation, anhedonia, anergia, social isolation, crying spells, feelings of hopelessness/helplessness/worthlessness/guilt. He denies symptoms associated with bethany such racing thoughts, mood lability, distractibility, tangentiality, and impulsivity. Patient denies any hx of sexual/physical/emotional abuse. Denies symptoms associated with PTSD such as nightmares, hypervigilance, dissociation, and flashbacks. He denies drinking alcohol, reports hx of cannabis use approx. 1-2 joints EOD, he denies any cigarette use. No prior admissions into substance rehab/detox. Patient is a 23 yo male, single, noncaregiver, domiciled with parents, unemployed, enrolled into [employer] [facility], with PPH of psychosis, 1 past psych admission for psychosis ([facility] in 2021), noncompliant with outpatient treatment, no known suicide attempts, no significant medical history, +history of punching walls in the context of psychiatric decompensation, +cannabis use, brought in by mother for acute psychosis in context of medication noncompliance.     As per ED behavioral health assessment note:  "On interview, pt is vague, easily distracted, internally preoccupied, with increased speech latency. Pt doesn't know why he is in the ED. He is able to state that he is hearing voices. He describes the AH as "music." He denies command AH. He denies VH. He feels people might be watching him but he is unable/unwilling to elaborate. He reports "sometimes" having SI. States he most recently had suicidal thoughts yesterday (2/20/23). He denies plan or intent. He denies homicidal or violent ideation, intent, or plan. States he hasn't slept in days but can't explain why. He denies substance use."    As per ED Behavioral Health note:   "Patient is a 22-year-old male, domiciled with parents and sister, a college student at [employer], psychiatrically hospitalized two years ago for similar presentation, bib accompanied by mother for pt decrease appetite and not sleeping Patient is not currently connected to a psychiatrist and is not on medications. Mother states that the patient smokes marijuana (last use 2-3 days ago), and occasionally drinks alcohol. Mother reports that the patient has not slept in the last 4 days, and someone is telling him something. Patient has been hearing voices and thinks someone is following him. Patient states that everyone keeps rejecting him. Patient also complained of having a headache. Mother states that the patient is not engaging in violent or aggressive behaviors. Patient is not presenting with any SI/HI. Patient similarly had similar symptoms 2 years ago. Patient has no medical issues and has been vaccinated for covid-19. Patient has no current exposure. Mother states that pt has a loss of appetite and she had to force him to eat. Patient has been showering at baseline. No prior SA as per mother. Mother states that the patient starts school on 3/4/2023 and maybe he needs to be on medications. Case discussed with psychiatry."       On unit:  Patient was seen and is evaluated on the unit. He is disorganized in TP, vague, internally preoccupied and thought blocked, otherwise is calm and cooperative with interview. He states that people around him have been following him. When further questioned about this he verbalizes that he does not know who is following him and what their intentions are. Does express "maybe they want to save me from something." He reports having trouble sleeping for the past 3 days, unable to quantify how much sleep he has been getting. Patient does verbalize being able to sleep on the unit last night. He reports that he spends most of his day playing video games, often into the night. Patient also reports having poor appetite over the past week. 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 Patient is difficult to follow at times, answering questions with illogical statements. He does verbalize being noncompliant with treatment after last hospitalization. States that he self-discontinued as the medications made him feel "dizzy and tired". Patient denies any SIB/SI plan or intent, no prior hx of NSSIB or SA. Of note, patient did endorse SI while he was in the ED. He denies any HI, plan or intent, has prior hx of aggressive behavior towards property (punching walls). Patient denies psychotic symptoms such as VH, magical thinking, thought insertion/broadcasting, ideas of reference. He does endorse paranoia and AH, states that he can hear voices that tell him "bad words" in English and Khmer (native tongue), also hears music, he denies any CAH. Patient denies symptoms of depression such as dysphoric mood, suicidal ideation, anhedonia, anergia, social isolation, crying spells, feelings of hopelessness/helplessness/worthlessness/guilt. He denies symptoms associated with bethany such racing thoughts, mood lability, distractibility, tangentiality, and impulsivity. Patient denies any hx of sexual/physical/emotional abuse. Denies symptoms associated with PTSD such as nightmares, hypervigilance, dissociation, and flashbacks. He denies drinking alcohol, reports hx of cannabis use approx. 1-2 joints EOD, he denies any cigarette use. No prior admissions into substance rehab/detox.

## 2023-02-22 NOTE — PSYCHIATRIC REHAB INITIAL EVALUATION - NSBHPRRECOMMEND_PSY_ALL_CORE
Writer met with patient in order to orient patient to unit and briefly introduce patient to psychiatric rehabilitation staff and department function. Patient was provided with a copy of unit schedule. Patient is not a reliable historian. Patient states he does not know why he is here, however chart reports that patient is currently admitted due to increased Psychosis in the context of medication non-compliance. Patient reports currently endorsing AH of songs in his head. Patient denies current SI and is able to contract for safety. Patient and writer established a collaborative psychiatric rehabilitation goal. Writer encouraged patient to attend psychiatric rehabilitation groups and engage in treatment. Psychiatric rehabilitation staff will engage patient daily.

## 2023-02-22 NOTE — DIETITIAN INITIAL EVALUATION ADULT - PERTINENT LABORATORY DATA
02-21    138  |  103  |  8   ----------------------------<  109<H>  4.5   |  23  |  0.94    Ca    10.2      21 Feb 2023 12:09    TPro  7.8  /  Alb  5.0  /  TBili  0.6  /  DBili  x   /  AST  67<H>  /  ALT  102<H>  /  AlkPhos  75  02-21

## 2023-02-22 NOTE — BH INPATIENT PSYCHIATRY ASSESSMENT NOTE - NSBHMETABOLIC_PSY_ALL_CORE_FT
BMI: BMI (kg/m2): 21.7 (02-21-23 @ 16:45)  HbA1c:   Glucose:   BP: 111/83 (02-21-23 @ 16:28) (111/83 - 133/84)  Lipid Panel:

## 2023-02-22 NOTE — BH SOCIAL WORK INITIAL PSYCHOSOCIAL EVALUATION - MEDICATIONS/VISITS
Problem: Potential for Falls  Goal: Patient will remain free of falls  Description: INTERVENTIONS:  - Assess patient frequently for physical needs  -  Identify cognitive and physical deficits and behaviors that affect risk of falls  -  [place] fall precautions as indicated by assessment   - Educate patient/family on patient safety including physical limitations  - Instruct patient to call for assistance with activity based on assessment  - Modify environment to reduce risk of injury  - Consider OT/PT consult to assist with strengthening/mobility  Outcome: Progressing     Problem: Nutrition/Hydration-ADULT  Goal: Nutrient/Hydration intake appropriate for improving, restoring or maintaining nutritional needs  Description: Monitor and assess patient's nutrition/hydration status for malnutrition  Collaborate with interdisciplinary team and initiate plan and interventions as ordered  Monitor patient's weight and dietary intake as ordered or per policy  Utilize nutrition screening tool and intervene as necessary  Determine patient's food preferences and provide high-protein, high-caloric foods as appropriate       INTERVENTIONS:  - Monitor oral intake, urinary output, labs, and treatment plans  - Assess nutrition and hydration status and recommend course of action  - Evaluate amount of meals eaten  - Assist patient with eating if necessary   - Allow adequate time for meals  - Recommend/ encourage appropriate diets, oral nutritional supplements, and vitamin/mineral supplements  - Order, calculate, and assess calorie counts as needed  - Recommend, monitor, and adjust tube feedings and TPN/PPN based on assessed needs  - Assess need for intravenous fluids  - Provide specific nutrition/hydration education as appropriate  - Include patient/family/caregiver in decisions related to nutrition  Outcome: Progressing     Problem: METABOLIC, FLUID AND ELECTROLYTES - ADULT  Goal: Electrolytes maintained within normal limits  Description: INTERVENTIONS:  - Monitor labs and assess patient for signs and symptoms of electrolyte imbalances  - Administer electrolyte replacement as ordered  - Monitor response to electrolyte replacements, including repeat lab results as appropriate  - Instruct patient on fluid and nutrition as appropriate  Outcome: Progressing  Goal: Fluid balance maintained  Description: INTERVENTIONS:  - Monitor labs   - Monitor I/O and WT  - Instruct patient on fluid and nutrition as appropriate  - Assess for signs & symptoms of volume excess or deficit  Outcome: Progressing     Problem: PAIN - ADULT  Goal: Verbalizes/displays adequate comfort level or baseline comfort level  Description: Interventions:  - Encourage patient to monitor pain and request assistance  - Assess pain using appropriate pain scale  - Administer analgesics based on type and severity of pain and evaluate response  - Implement non-pharmacological measures as appropriate and evaluate response  - Consider cultural and social influences on pain and pain management  - Notify physician/advanced practitioner if interventions unsuccessful or patient reports new pain  Outcome: Progressing     Problem: SAFETY ADULT  Goal: Patient will remain free of falls  Description: INTERVENTIONS:  - Assess patient frequently for physical needs  -  Identify cognitive and physical deficits and behaviors that affect risk of falls    -  [place] fall precautions as indicated by assessment   - Educate patient/family on patient safety including physical limitations  - Instruct patient to call for assistance with activity based on assessment  - Modify environment to reduce risk of injury  - Consider OT/PT consult to assist with strengthening/mobility  Outcome: Progressing  Goal: Maintain or return to baseline ADL function  Description: INTERVENTIONS:  -  Assess patient's ability to carry out ADLs; assess patient's baseline for ADL function and identify physical deficits which impact ability to perform ADLs (bathing, care of mouth/teeth, toileting, grooming, dressing, etc )  - Assess/evaluate cause of self-care deficits   - Assess range of motion  - Assess patient's mobility; develop plan if impaired  - Assess patient's need for assistive devices and provide as appropriate  - Encourage maximum independence but intervene and supervise when necessary  - Involve family in performance of ADLs  - Assess for home care needs following discharge   - Consider OT consult to assist with ADL evaluation and planning for discharge  - Provide patient education as appropriate  Outcome: Progressing  Goal: Maintain or return mobility status to optimal level  Description: INTERVENTIONS:  - Assess patient's baseline mobility status (ambulation, transfers, stairs, etc )    - Identify cognitive and physical deficits and behaviors that affect mobility  - Identify mobility aids required to assist with transfers and/or ambulation (gait belt, sit-to-stand, lift, walker, cane, etc )  - [place] fall precautions as indicated by assessment  - Record patient progress and toleration of activity level on Mobility SBAR; progress patient to next Phase/Stage  - Instruct patient to call for assistance with activity based on assessment  - Consider rehabilitation consult to assist with strengthening/weightbearing, etc   Outcome: Progressing     Problem: DISCHARGE PLANNING  Goal: Discharge to home or other facility with appropriate resources  Description: INTERVENTIONS:  - Identify barriers to discharge w/patient and caregiver  - Arrange for needed discharge resources and transportation as appropriate  - Identify discharge learning needs (meds, wound care, etc )  - Arrange for interpretive services to assist at discharge as needed  - Refer to Case Management Department for coordinating discharge planning if the patient needs post-hospital services based on physician/advanced practitioner order or complex needs related to functional status, cognitive ability, or social support system  Outcome: Progressing     Problem: Knowledge Deficit  Goal: Patient/family/caregiver demonstrates understanding of disease process, treatment plan, medications, and discharge instructions  Description: Complete learning assessment and assess knowledge base    Interventions:  - Provide teaching at level of understanding  - Provide teaching via preferred learning methods  Outcome: Progressing no

## 2023-02-22 NOTE — BH INPATIENT PSYCHIATRY ASSESSMENT NOTE - NSTXDCOTHRGOAL_PSY_ALL_CORE
Pt will show a reduction of disorganization and engage meaningfully with writer to identify a safe discharge plan. Pt will comply with recommended tx plan and medications for 5 days, identify 2 benefits for adhering to tx.

## 2023-02-22 NOTE — BH INPATIENT PSYCHIATRY ASSESSMENT NOTE - NSBHCHARTREVIEWVS_PSY_A_CORE FT
Vital Signs Last 24 Hrs  T(C): 36.9 (02-22-23 @ 14:21), Max: 36.9 (02-22-23 @ 14:21)  T(F): 98.4 (02-22-23 @ 14:21), Max: 98.4 (02-22-23 @ 14:21)  HR: 85 (02-21-23 @ 16:28) (85 - 85)  BP: 111/83 (02-21-23 @ 16:28) (111/83 - 111/83)  BP(mean): --  RR: 18 (02-21-23 @ 16:28) (18 - 18)  SpO2: 100% (02-21-23 @ 16:28) (100% - 100%)    Orthostatic VS  02-22-23 @ 08:48  Lying BP: --/-- HR: --  Sitting BP: 104/70 HR: 97  Standing BP: 98/68 HR: 110  Site: --  Mode: electronic  Orthostatic VS  02-21-23 @ 16:45  Lying BP: --/-- HR: --  Sitting BP: 115/90 HR: 108  Standing BP: --/-- HR: --  Site: --  Mode: --

## 2023-02-22 NOTE — DIETITIAN INITIAL EVALUATION ADULT - ADD RECOMMEND
Halal diet, no beef/pork per pt's preference. Encourage po intake as tolerated and honor food preferences PRN. Defer antiemetic rx to MD/team. Monitor PO intake/tolerance, weights, labs, BM's, and skin integrity.

## 2023-02-22 NOTE — BH INPATIENT PSYCHIATRY ASSESSMENT NOTE - RISK ASSESSMENT
Patient is currently at low acute suicide risk, he denies any SIB/SI, plan or intent. (As per chart review, pt. endorsed SI while in the ED). No prior hx of SA. He denies having access to lethal methods. Patient is at elevated chronic suicide risk due to hx of psychosis and cannabis abuse. He is at low risk for potential violence.

## 2023-02-22 NOTE — BH INPATIENT PSYCHIATRY ASSESSMENT NOTE - NSICDXBHTERTIARYDX_PSY_ALL_CORE
R/O PTSD (post-traumatic stress disorder)   F43.10  R/O Schizoaffective disorder, bipolar type   F25.0

## 2023-02-23 PROCEDURE — 99232 SBSQ HOSP IP/OBS MODERATE 35: CPT

## 2023-02-23 RX ORDER — RISPERIDONE 4 MG/1
0.5 TABLET ORAL DAILY
Refills: 0 | Status: DISCONTINUED | OUTPATIENT
Start: 2023-02-24 | End: 2023-02-25

## 2023-02-23 RX ADMIN — Medication 3 MILLIGRAM(S): at 20:20

## 2023-02-23 RX ADMIN — RISPERIDONE 1 MILLIGRAM(S): 4 TABLET ORAL at 20:20

## 2023-02-23 NOTE — BH INPATIENT PSYCHIATRY PROGRESS NOTE - NSBHASSESSSUMMFT_PSY_ALL_CORE
Patient is a 23 yo male, single, noncaregiver, domiciled with parents, unemployed, enrolled into [employer], with PPH of psychosis, 1 past psych admission for psychosis ([facility] in 2021), noncompliant with outpatient treatment, no known suicide attempts, no significant medical history, +history of punching walls in the context of psychiatric decompensation, +cannabis use, brought in by mother for acute psychosis in context of medication noncompliance. Patient was admitted to [facility] on 9.39 legal status. He is currently a danger to self, is unable to fend for self in the community and meets criteria for inpatient hospitalization.      Working Diagnoses:  Bipolar disorder, with psychosis  Cannabis use disorder  R/o PTSD, SAD, bipolar type    Plan:  >Legal: 9.39  >Obs: Routine observation, low risk for suicide and is able to engage in safety planning    >Psychiatric Meds:  Continue Risperidone 1 mg, HS for psychosis/mood    PRN medications:  Lorazepam 2 mg, Q6H PRN for agitation/anxiety  Lorazepam 2 mg IM, once PRN for agitation.  Haldol 5 mg IM, once PRN for agitation  Haldol 5 mg PO, Q6H PRN for agitation  Benadryl 50 mg PO, Q6H PRN for agitation  Benadryl 50 mg IM, once PRN for agitation    >Labs: Admission labs reviewed, no acute findings. Hold antipsychotics if QTc >500  >Medical: No acute concerns. No consultations needed at this time. No indication for CIWA. Patient with stable VS, no visible physical symptoms of withdrawal. During the course of treatment, will collaborate with medical team to manage medical issues.  >Diet: Regular  >Social: Milieu/structured therapy  >Treatment Interventions: Groups and Individual Therapy/CBT.  >Dispo: Collateral and dispo planning pending further symptom and medication optimization

## 2023-02-23 NOTE — BH INPATIENT PSYCHIATRY PROGRESS NOTE - NSBHCHARTREVIEWVS_PSY_A_CORE FT
Vital Signs Last 24 Hrs  T(C): 36.4 (02-23-23 @ 06:24), Max: 36.9 (02-22-23 @ 14:21)  T(F): 97.6 (02-23-23 @ 06:24), Max: 98.4 (02-22-23 @ 14:21)  HR: --  BP: --  BP(mean): --  RR: --  SpO2: --    Orthostatic VS  02-23-23 @ 06:19  Lying BP: --/-- HR: --  Sitting BP: 100/78 HR: 115  Standing BP: 110/71 HR: 111  Site: --  Mode: --  Orthostatic VS  02-22-23 @ 19:19  Lying BP: --/-- HR: --  Sitting BP: 124/89 HR: --  Standing BP: 122/85 HR: 94  Site: --  Mode: --  Orthostatic VS  02-22-23 @ 08:48  Lying BP: --/-- HR: --  Sitting BP: 104/70 HR: 97  Standing BP: 98/68 HR: 110  Site: --  Mode: electronic  Orthostatic VS  02-21-23 @ 16:45  Lying BP: --/-- HR: --  Sitting BP: 115/90 HR: 108  Standing BP: --/-- HR: --  Site: --  Mode: --

## 2023-02-23 NOTE — BH INPATIENT PSYCHIATRY PROGRESS NOTE - CURRENT MEDICATION
MEDICATIONS  (STANDING):  risperiDONE  Disintegrating Tablet 1 milliGRAM(s) Oral at bedtime    MEDICATIONS  (PRN):  diphenhydrAMINE 50 milliGRAM(s) Oral every 6 hours PRN EPS ppx.  haloperidol     Tablet 5 milliGRAM(s) Oral every 6 hours PRN agitation  haloperidol    Injectable 5 milliGRAM(s) IntraMuscular Once PRN severe agitation  LORazepam     Tablet 2 milliGRAM(s) Oral every 6 hours PRN Agitation  LORazepam   Injectable 2 milliGRAM(s) IntraMuscular Once PRN severe agitation  melatonin. 3 milliGRAM(s) Oral at bedtime PRN Insomnia

## 2023-02-24 PROCEDURE — 99232 SBSQ HOSP IP/OBS MODERATE 35: CPT

## 2023-02-24 PROCEDURE — 90853 GROUP PSYCHOTHERAPY: CPT

## 2023-02-24 RX ADMIN — RISPERIDONE 0.5 MILLIGRAM(S): 4 TABLET ORAL at 09:32

## 2023-02-24 RX ADMIN — RISPERIDONE 1 MILLIGRAM(S): 4 TABLET ORAL at 21:08

## 2023-02-24 NOTE — BH INPATIENT PSYCHIATRY PROGRESS NOTE - CURRENT MEDICATION
MEDICATIONS  (STANDING):  risperiDONE  Disintegrating Tablet 0.5 milliGRAM(s) Oral daily  risperiDONE  Disintegrating Tablet 1 milliGRAM(s) Oral at bedtime    MEDICATIONS  (PRN):  diphenhydrAMINE 50 milliGRAM(s) Oral every 6 hours PRN EPS ppx.  haloperidol     Tablet 5 milliGRAM(s) Oral every 6 hours PRN agitation  haloperidol    Injectable 5 milliGRAM(s) IntraMuscular Once PRN severe agitation  LORazepam     Tablet 2 milliGRAM(s) Oral every 6 hours PRN Agitation  LORazepam   Injectable 2 milliGRAM(s) IntraMuscular Once PRN severe agitation  melatonin. 3 milliGRAM(s) Oral at bedtime PRN Insomnia

## 2023-02-24 NOTE — BH PSYCHOLOGY - GROUP THERAPY NOTE - NSPSYCHOLGRPCOGINT_PSY_A_CORE FT
Cognitive/behavioral therapy, Acceptance and Commitment therapy, Emotion regulation/coping skills taught, Psychoed

## 2023-02-24 NOTE — BH INPATIENT PSYCHIATRY PROGRESS NOTE - NSBHASSESSSUMMFT_PSY_ALL_CORE
Patient is a 21 yo male, single, noncaregiver, domiciled with parents, unemployed, enrolled into [employer], with PPH of psychosis, 1 past psych admission for psychosis ([facility] in 2021), noncompliant with outpatient treatment, no known suicide attempts, no significant medical history, +history of punching walls in the context of psychiatric decompensation, +cannabis use, brought in by mother for acute psychosis in context of medication noncompliance. Patient was admitted to [facility] on 9.39 legal status. He is currently a danger to self, is unable to fend for self in the community and meets criteria for inpatient hospitalization.      Working Diagnoses:  Bipolar disorder, with psychosis  Cannabis use disorder  R/o PTSD, SAD, bipolar type    Plan:  >Legal: 9.39  >Obs: Routine observation, low risk for suicide and is able to engage in safety planning    >Psychiatric Meds:  Continue Risperidone 0.5 mg, AM for psychosis/mood  Continue Risperidone 1 mg, HS for psychosis/mood    PRN medications:  Lorazepam 2 mg, Q6H PRN for agitation/anxiety  Lorazepam 2 mg IM, once PRN for agitation.  Haldol 5 mg IM, once PRN for agitation  Haldol 5 mg PO, Q6H PRN for agitation  Benadryl 50 mg PO, Q6H PRN for agitation  Benadryl 50 mg IM, once PRN for agitation    >Labs: Admission labs reviewed, no acute findings. Hold antipsychotics if QTc >500  >Medical: No acute concerns. No consultations needed at this time. No indication for CIWA. Patient with stable VS, no visible physical symptoms of withdrawal. During the course of treatment, will collaborate with medical team to manage medical issues.  >Diet: Regular  >Social: Milieu/structured therapy  >Treatment Interventions: Groups and Individual Therapy/CBT.  >Dispo: Collateral and dispo planning pending further symptom and medication optimization

## 2023-02-24 NOTE — BH INPATIENT PSYCHIATRY PROGRESS NOTE - NSBHCHARTREVIEWVS_PSY_A_CORE FT
Vital Signs Last 24 Hrs  T(C): 36.9 (02-24-23 @ 14:23), Max: 36.9 (02-24-23 @ 14:23)  T(F): 98.4 (02-24-23 @ 14:23), Max: 98.4 (02-24-23 @ 14:23)  HR: --  BP: --  BP(mean): --  RR: --  SpO2: --    Orthostatic VS  02-24-23 @ 08:05  Lying BP: --/-- HR: --  Sitting BP: 131/94 HR: 90  Standing BP: 124/83 HR: 93  Site: upper left arm  Mode: electronic  Orthostatic VS  02-23-23 @ 19:19  Lying BP: --/-- HR: --  Sitting BP: 118/76 HR: 101  Standing BP: 123/74 HR: 98  Site: --  Mode: --  Orthostatic VS  02-23-23 @ 06:19  Lying BP: --/-- HR: --  Sitting BP: 100/78 HR: 115  Standing BP: 110/71 HR: 111  Site: --  Mode: --  Orthostatic VS  02-22-23 @ 19:19  Lying BP: --/-- HR: --  Sitting BP: 124/89 HR: --  Standing BP: 122/85 HR: 94  Site: --  Mode: --

## 2023-02-24 NOTE — BH PSYCHOLOGY - GROUP THERAPY NOTE - NSBHPSYCHOLRESPCOMMENT_PSY_A_CORE FT
The patient appeared adequately groomed. Pt appeared fairly engaged in group; Pt was quiet throughout the discussion but was observed to be listening attentively throughout. Speech was WNL. PT was oriented X3. Pt was appropriate with peers.

## 2023-02-24 NOTE — BH PSYCHOLOGY - GROUP THERAPY NOTE - NSPSYCHOLGRPCOGPT_PSY_A_CORE FT
Patient attended Cognitive Behavioral Therapy Group incorporating ACT-based concepts. The group started with a brief check-in asking Pt’s to share a strength or accomplishment that they are most proud of. Group then engaged Pts in a discussion reacting to quotes about values and the importance of identifying them. Lastly, Group focused on discussing their values, how these values have changed throughout different stages of life/how they differ from others, what they value about others, and how sometimes our actions do not align with our values/the impacts of this. Group facilitator explained concepts, reinforced participation, and engaged patients in the discussion.

## 2023-02-24 NOTE — BH PSYCHOLOGY - GROUP THERAPY NOTE - TOKEN PULL-DIAGNOSIS
Primary Diagnosis:  Bipolar disorder with psychotic features [F31.9]      Schizoaffective disorder, bipolar type [F25.0]      Psychosis, unspecified psychosis type [F29]        Problem Dx:   Cannabis abuse [F12.10]

## 2023-02-25 PROCEDURE — 99232 SBSQ HOSP IP/OBS MODERATE 35: CPT

## 2023-02-25 RX ORDER — RISPERIDONE 4 MG/1
1 TABLET ORAL DAILY
Refills: 0 | Status: DISCONTINUED | OUTPATIENT
Start: 2023-02-26 | End: 2023-02-26

## 2023-02-25 RX ADMIN — RISPERIDONE 1 MILLIGRAM(S): 4 TABLET ORAL at 20:17

## 2023-02-25 RX ADMIN — RISPERIDONE 0.5 MILLIGRAM(S): 4 TABLET ORAL at 08:26

## 2023-02-25 NOTE — BH INPATIENT PSYCHIATRY PROGRESS NOTE - NSBHCHARTREVIEWVS_PSY_A_CORE FT
Vital Signs Last 24 Hrs  T(C): 36.9 (02-25-23 @ 06:41), Max: 36.9 (02-24-23 @ 14:23)  T(F): 98.5 (02-25-23 @ 06:41), Max: 98.5 (02-25-23 @ 06:41)  HR: --  BP: --  BP(mean): --  RR: --  SpO2: --    Orthostatic VS  02-25-23 @ 07:56  Lying BP: --/-- HR: --  Sitting BP: 119/86 HR: 90  Standing BP: 123/71 HR: 104  Site: --  Mode: --  Orthostatic VS  02-24-23 @ 19:51  Lying BP: --/-- HR: --  Sitting BP: 125/85 HR: 87  Standing BP: 136/87 HR: 101  Site: --  Mode: --  Orthostatic VS  02-24-23 @ 08:05  Lying BP: --/-- HR: --  Sitting BP: 131/94 HR: 90  Standing BP: 124/83 HR: 93  Site: upper left arm  Mode: electronic  Orthostatic VS  02-23-23 @ 19:19  Lying BP: --/-- HR: --  Sitting BP: 118/76 HR: 101  Standing BP: 123/74 HR: 98  Site: --  Mode: --

## 2023-02-25 NOTE — BH INPATIENT PSYCHIATRY PROGRESS NOTE - NSBHASSESSSUMMFT_PSY_ALL_CORE
Patient is a 23 yo male, single, noncaregiver, domiciled with parents, unemployed, enrolled into [employer], with PPH of psychosis, 1 past psych admission for psychosis ([facility] in 2021), noncompliant with outpatient treatment, no known suicide attempts, no significant medical history, +history of punching walls in the context of psychiatric decompensation, +cannabis use, brought in by mother for acute psychosis in context of medication noncompliance. Patient was admitted to [facility] on 9.39 legal status. He is currently a danger to self, is unable to fend for self in the community and meets criteria for inpatient hospitalization.      Working Diagnoses:  Bipolar disorder, with psychosis  Cannabis use disorder  R/o PTSD, SAD, bipolar type    Plan:  >Legal: 9.39  >Obs: Routine observation, low risk for suicide and is able to engage in safety planning    >Psychiatric Meds:  Continue Risperidone 0.5 mg, AM for psychosis/mood  Continue Risperidone 1 mg, qhs for psychosis/mood    PRN medications:  Lorazepam 2 mg, Q6H PRN for agitation/anxiety  Lorazepam 2 mg IM, once PRN for agitation.  Haldol 5 mg IM, once PRN for agitation  Haldol 5 mg PO, Q6H PRN for agitation  Benadryl 50 mg PO, Q6H PRN for agitation  Benadryl 50 mg IM, once PRN for agitation    >Labs: Admission labs reviewed, no acute findings. Hold antipsychotics if QTc >500  >Medical: No acute concerns. No consultations needed at this time. No indication for CIWA. Patient with stable VS, no visible physical symptoms of withdrawal. During the course of treatment, will collaborate with medical team to manage medical issues.  >Diet: Regular  >Social: Milieu/structured therapy  >Treatment Interventions: Groups and Individual Therapy/CBT.  >Dispo: Collateral and dispo planning pending further symptom and medication optimization

## 2023-02-26 PROCEDURE — 99232 SBSQ HOSP IP/OBS MODERATE 35: CPT

## 2023-02-26 RX ORDER — IBUPROFEN 200 MG
400 TABLET ORAL ONCE
Refills: 0 | Status: COMPLETED | OUTPATIENT
Start: 2023-02-26 | End: 2023-02-26

## 2023-02-26 RX ORDER — RISPERIDONE 4 MG/1
1 TABLET ORAL
Refills: 0 | Status: DISCONTINUED | OUTPATIENT
Start: 2023-02-26 | End: 2023-03-01

## 2023-02-26 RX ADMIN — Medication 400 MILLIGRAM(S): at 22:35

## 2023-02-26 RX ADMIN — RISPERIDONE 1 MILLIGRAM(S): 4 TABLET ORAL at 20:38

## 2023-02-26 RX ADMIN — RISPERIDONE 1 MILLIGRAM(S): 4 TABLET ORAL at 08:38

## 2023-02-26 NOTE — BH INPATIENT PSYCHIATRY PROGRESS NOTE - NSBHASSESSSUMMFT_PSY_ALL_CORE
Patient is a 21 yo male, single, noncaregiver, domiciled with parents, unemployed, enrolled into [employer], with PPH of psychosis, 1 past psych admission for psychosis ([facility] in 2021), noncompliant with outpatient treatment, no known suicide attempts, no significant medical history, +history of punching walls in the context of psychiatric decompensation, +cannabis use, brought in by mother for acute psychosis in context of medication noncompliance. Patient was admitted to [facility] on 9.39 legal status. He is currently a danger to self, is unable to fend for self in the community and meets criteria for inpatient hospitalization.      Working Diagnoses:  Bipolar disorder, with psychosis  Cannabis use disorder  R/o PTSD, SAD, bipolar type    Plan:  >Legal: 9.39  >Obs: Routine observation, low risk for suicide and is able to engage in safety planning    >Psychiatric Meds:  Continue Risperidone 0.5 mg, AM for psychosis/mood  Continue Risperidone 1 mg, qhs for psychosis/mood    PRN medications:  Lorazepam 2 mg, Q6H PRN for agitation/anxiety  Lorazepam 2 mg IM, once PRN for agitation.  Haldol 5 mg IM, once PRN for agitation  Haldol 5 mg PO, Q6H PRN for agitation  Benadryl 50 mg PO, Q6H PRN for agitation  Benadryl 50 mg IM, once PRN for agitation    >Labs: Admission labs reviewed, no acute findings. Hold antipsychotics if QTc >500  >Medical: No acute concerns. No consultations needed at this time. No indication for CIWA. Patient with stable VS, no visible physical symptoms of withdrawal. During the course of treatment, will collaborate with medical team to manage medical issues.  >Diet: Regular  >Social: Milieu/structured therapy  >Treatment Interventions: Groups and Individual Therapy/CBT.  >Dispo: Collateral and dispo planning pending further symptom and medication optimization Patient is a 21 yo male, single, noncaregiver, domiciled with parents, unemployed, enrolled into [employer], with PPH of psychosis, 1 past psych admission for psychosis ([facility] in 2021), noncompliant with outpatient treatment, no known suicide attempts, no significant medical history, +history of punching walls in the context of psychiatric decompensation, +cannabis use, brought in by mother for acute psychosis in context of medication noncompliance. Patient was admitted to [facility] on 9.39 legal status. He is currently a danger to self, is unable to fend for self in the community and meets criteria for inpatient hospitalization.      Working Diagnoses:  Bipolar disorder, with psychosis  Cannabis use disorder  R/o PTSD, SAD, bipolar type    Plan:  >Legal: 9.39  >Obs: Routine observation, low risk for suicide and is able to engage in safety planning    >Psychiatric Meds:  Increase  Risperidone 1 mg,BID  for psychosis/mood    PRN medications:  Lorazepam 2 mg, Q6H PRN for agitation/anxiety  Lorazepam 2 mg IM, once PRN for agitation.  Haldol 5 mg IM, once PRN for agitation  Haldol 5 mg PO, Q6H PRN for agitation  Benadryl 50 mg PO, Q6H PRN for agitation  Benadryl 50 mg IM, once PRN for agitation    >Labs: Admission labs reviewed, no acute findings. Hold antipsychotics if QTc >500  >Medical: No acute concerns. No consultations needed at this time. No indication for CIWA. Patient with stable VS, no visible physical symptoms of withdrawal. During the course of treatment, will collaborate with medical team to manage medical issues.  >Diet: Regular  >Social: Milieu/structured therapy  >Treatment Interventions: Groups and Individual Therapy/CBT.  >Dispo: Collateral and dispo planning pending further symptom and medication optimization

## 2023-02-26 NOTE — BH INPATIENT PSYCHIATRY PROGRESS NOTE - NSBHCHARTREVIEWVS_PSY_A_CORE FT
Vital Signs Last 24 Hrs  T(C): 36.6 (02-26-23 @ 06:20), Max: 36.9 (02-25-23 @ 06:41)  T(F): 97.9 (02-26-23 @ 06:20), Max: 98.5 (02-25-23 @ 06:41)  HR: --  BP: --  BP(mean): --  RR: --  SpO2: --    Orthostatic VS  02-25-23 @ 19:39  Lying BP: --/-- HR: --  Sitting BP: 124/76 HR: 89  Standing BP: 127/83 HR: 107  Site: --  Mode: --  Orthostatic VS  02-25-23 @ 07:56  Lying BP: --/-- HR: --  Sitting BP: 119/86 HR: 90  Standing BP: 123/71 HR: 104  Site: --  Mode: --  Orthostatic VS  02-24-23 @ 19:51  Lying BP: --/-- HR: --  Sitting BP: 125/85 HR: 87  Standing BP: 136/87 HR: 101  Site: --  Mode: --  Orthostatic VS  02-24-23 @ 08:05  Lying BP: --/-- HR: --  Sitting BP: 131/94 HR: 90  Standing BP: 124/83 HR: 93  Site: upper left arm  Mode: electronic   Vital Signs Last 24 Hrs  T(C): 36.6 (02-26-23 @ 06:20), Max: 36.7 (02-25-23 @ 14:58)  T(F): 97.9 (02-26-23 @ 06:20), Max: 98.1 (02-25-23 @ 14:58)  HR: --  BP: --  BP(mean): --  RR: --  SpO2: --    Orthostatic VS  02-26-23 @ 07:58  Lying BP: --/-- HR: --  Sitting BP: 135/85 HR: 80  Standing BP: 121/81 HR: 96  Site: upper left arm  Mode: electronic  Orthostatic VS  02-25-23 @ 19:39  Lying BP: --/-- HR: --  Sitting BP: 124/76 HR: 89  Standing BP: 127/83 HR: 107  Site: --  Mode: --  Orthostatic VS  02-25-23 @ 07:56  Lying BP: --/-- HR: --  Sitting BP: 119/86 HR: 90  Standing BP: 123/71 HR: 104  Site: --  Mode: --  Orthostatic VS  02-24-23 @ 19:51  Lying BP: --/-- HR: --  Sitting BP: 125/85 HR: 87  Standing BP: 136/87 HR: 101  Site: --  Mode: --

## 2023-02-26 NOTE — BH INPATIENT PSYCHIATRY PROGRESS NOTE - CURRENT MEDICATION
MEDICATIONS  (STANDING):  risperiDONE  Disintegrating Tablet 1 milliGRAM(s) Oral at bedtime  risperiDONE  Disintegrating Tablet 1 milliGRAM(s) Oral daily    MEDICATIONS  (PRN):  diphenhydrAMINE 50 milliGRAM(s) Oral every 6 hours PRN EPS ppx.  haloperidol     Tablet 5 milliGRAM(s) Oral every 6 hours PRN agitation  haloperidol    Injectable 5 milliGRAM(s) IntraMuscular Once PRN severe agitation  LORazepam     Tablet 2 milliGRAM(s) Oral every 6 hours PRN Agitation  LORazepam   Injectable 2 milliGRAM(s) IntraMuscular Once PRN severe agitation  melatonin. 3 milliGRAM(s) Oral at bedtime PRN Insomnia   MEDICATIONS  (STANDING):  risperiDONE  Disintegrating Tablet 1 milliGRAM(s) Oral two times a day    MEDICATIONS  (PRN):  diphenhydrAMINE 50 milliGRAM(s) Oral every 6 hours PRN EPS ppx.  haloperidol     Tablet 5 milliGRAM(s) Oral every 6 hours PRN agitation  haloperidol    Injectable 5 milliGRAM(s) IntraMuscular Once PRN severe agitation  LORazepam     Tablet 2 milliGRAM(s) Oral every 6 hours PRN Agitation  LORazepam   Injectable 2 milliGRAM(s) IntraMuscular Once PRN severe agitation  melatonin. 3 milliGRAM(s) Oral at bedtime PRN Insomnia

## 2023-02-27 PROCEDURE — 99232 SBSQ HOSP IP/OBS MODERATE 35: CPT

## 2023-02-27 RX ORDER — LITHIUM CARBONATE 300 MG/1
900 TABLET, EXTENDED RELEASE ORAL AT BEDTIME
Refills: 0 | Status: DISCONTINUED | OUTPATIENT
Start: 2023-02-27 | End: 2023-03-03

## 2023-02-27 RX ADMIN — LITHIUM CARBONATE 900 MILLIGRAM(S): 300 TABLET, EXTENDED RELEASE ORAL at 21:19

## 2023-02-27 RX ADMIN — RISPERIDONE 1 MILLIGRAM(S): 4 TABLET ORAL at 21:19

## 2023-02-27 RX ADMIN — RISPERIDONE 1 MILLIGRAM(S): 4 TABLET ORAL at 08:38

## 2023-02-27 NOTE — BH INPATIENT PSYCHIATRY PROGRESS NOTE - NSBHCHARTREVIEWVS_PSY_A_CORE FT
Vital Signs Last 24 Hrs  T(C): 36.6 (02-27-23 @ 14:26), Max: 36.8 (02-26-23 @ 22:28)  T(F): 97.9 (02-27-23 @ 14:26), Max: 98.3 (02-26-23 @ 22:28)  HR: --  BP: --  BP(mean): --  RR: --  SpO2: 100% (02-27-23 @ 06:06) (100% - 100%)    Orthostatic VS  02-27-23 @ 06:06  Lying BP: --/-- HR: --  Sitting BP: 120/73 HR: 82  Standing BP: 129/78 HR: 86  Site: --  Mode: --  Orthostatic VS  02-26-23 @ 19:46  Lying BP: --/-- HR: --  Sitting BP: 128/79 HR: 92  Standing BP: 131/84 HR: 104  Site: --  Mode: --  Orthostatic VS  02-26-23 @ 07:58  Lying BP: --/-- HR: --  Sitting BP: 135/85 HR: 80  Standing BP: 121/81 HR: 96  Site: upper left arm  Mode: electronic  Orthostatic VS  02-25-23 @ 19:39  Lying BP: --/-- HR: --  Sitting BP: 124/76 HR: 89  Standing BP: 127/83 HR: 107  Site: --  Mode: --

## 2023-02-27 NOTE — BH INPATIENT PSYCHIATRY PROGRESS NOTE - NSBHASSESSSUMMFT_PSY_ALL_CORE
Patient is a 21 yo male, single, noncaregiver, domiciled with parents, unemployed, enrolled into [employer], with PPH of psychosis, 1 past psych admission for psychosis ([facility] in 2021), noncompliant with outpatient treatment, no known suicide attempts, no significant medical history, +history of punching walls in the context of psychiatric decompensation, +cannabis use, brought in by mother for acute psychosis in context of medication noncompliance. Patient was admitted to [facility] on 9.39 legal status. He is currently a danger to self, is unable to fend for self in the community and meets criteria for inpatient hospitalization.      Working Diagnoses:  Bipolar disorder, with psychosis  Cannabis use disorder  R/o PTSD, SAD, bipolar type    Plan:  >Legal: 9.39  >Obs: Routine observation, low risk for suicide and is able to engage in safety planning    >Psychiatric Meds:  Continue Risperidone 1 mg BID, for psychosis  Start Lithium  mg, HS for mood    PRN medications:  Lorazepam 2 mg, Q6H PRN for agitation/anxiety  Lorazepam 2 mg IM, once PRN for agitation.  Haldol 5 mg IM, once PRN for agitation  Haldol 5 mg PO, Q6H PRN for agitation  Benadryl 50 mg PO, Q6H PRN for agitation  Benadryl 50 mg IM, once PRN for agitation    >Labs: Admission labs reviewed, no acute findings. Hold antipsychotics if QTc >500  >Medical: No acute concerns. No consultations needed at this time. No indication for CIWA. Patient with stable VS, no visible physical symptoms of withdrawal. During the course of treatment, will collaborate with medical team to manage medical issues.  >Diet: Regular  >Social: Milieu/structured therapy  >Treatment Interventions: Groups and Individual Therapy/CBT.  >Dispo: Collateral and dispo planning pending further symptom and medication optimization

## 2023-02-27 NOTE — BH INPATIENT PSYCHIATRY PROGRESS NOTE - CURRENT MEDICATION
MEDICATIONS  (STANDING):  lithium CR (ESKALITH-CR) 900 milliGRAM(s) Oral at bedtime  risperiDONE  Disintegrating Tablet 1 milliGRAM(s) Oral two times a day    MEDICATIONS  (PRN):  diphenhydrAMINE 50 milliGRAM(s) Oral every 6 hours PRN EPS ppx.  haloperidol     Tablet 5 milliGRAM(s) Oral every 6 hours PRN agitation  haloperidol    Injectable 5 milliGRAM(s) IntraMuscular Once PRN severe agitation  LORazepam     Tablet 2 milliGRAM(s) Oral every 6 hours PRN Agitation  LORazepam   Injectable 2 milliGRAM(s) IntraMuscular Once PRN severe agitation  melatonin. 3 milliGRAM(s) Oral at bedtime PRN Insomnia

## 2023-02-28 PROCEDURE — 99232 SBSQ HOSP IP/OBS MODERATE 35: CPT

## 2023-02-28 RX ADMIN — RISPERIDONE 1 MILLIGRAM(S): 4 TABLET ORAL at 08:24

## 2023-02-28 RX ADMIN — RISPERIDONE 1 MILLIGRAM(S): 4 TABLET ORAL at 19:57

## 2023-02-28 RX ADMIN — LITHIUM CARBONATE 900 MILLIGRAM(S): 300 TABLET, EXTENDED RELEASE ORAL at 19:57

## 2023-02-28 NOTE — BH INPATIENT PSYCHIATRY PROGRESS NOTE - NSBHCHARTREVIEWVS_PSY_A_CORE FT
Vital Signs Last 24 Hrs  T(C): 37.1 (02-28-23 @ 14:33), Max: 37.1 (02-28-23 @ 14:33)  T(F): 98.7 (02-28-23 @ 14:33), Max: 98.7 (02-28-23 @ 14:33)  HR: --  BP: --  BP(mean): --  RR: --  SpO2: --    Orthostatic VS  02-28-23 @ 06:45  Lying BP: --/-- HR: --  Sitting BP: 127/78 HR: 89  Standing BP: 126/74 HR: 101  Site: --  Mode: --  Orthostatic VS  02-27-23 @ 19:34  Lying BP: --/-- HR: --  Sitting BP: 133/90 HR: 81  Standing BP: 117/83 HR: 94  Site: --  Mode: electronic  Orthostatic VS  02-27-23 @ 06:06  Lying BP: --/-- HR: --  Sitting BP: 120/73 HR: 82  Standing BP: 129/78 HR: 86  Site: --  Mode: --  Orthostatic VS  02-26-23 @ 19:46  Lying BP: --/-- HR: --  Sitting BP: 128/79 HR: 92  Standing BP: 131/84 HR: 104  Site: --  Mode: --

## 2023-02-28 NOTE — BH INPATIENT PSYCHIATRY PROGRESS NOTE - NSBHASSESSSUMMFT_PSY_ALL_CORE
Patient is a 21 yo male, single, noncaregiver, domiciled with parents, unemployed, enrolled into [employer], with PPH of psychosis, 1 past psych admission for psychosis ([facility] in 2021), noncompliant with outpatient treatment, no known suicide attempts, no significant medical history, +history of punching walls in the context of psychiatric decompensation, +cannabis use, brought in by mother for acute psychosis in context of medication noncompliance. Patient was admitted to [facility] on 9.39 legal status. He is currently a danger to self, is unable to fend for self in the community and meets criteria for inpatient hospitalization.      Working Diagnoses:  Bipolar disorder, with psychosis  Cannabis use disorder  R/o PTSD, SAD, bipolar type    Plan:  >Legal: 9.39  >Obs: Routine observation, low risk for suicide and is able to engage in safety planning    >Psychiatric Meds:  Continue Risperidone 1 mg BID, for psychosis  Continue Lithium  mg, HS for mood    PRN medications:  Lorazepam 2 mg, Q6H PRN for agitation/anxiety  Lorazepam 2 mg IM, once PRN for agitation.  Haldol 5 mg IM, once PRN for agitation  Haldol 5 mg PO, Q6H PRN for agitation  Benadryl 50 mg PO, Q6H PRN for agitation  Benadryl 50 mg IM, once PRN for agitation    >Labs: Admission labs reviewed, no acute findings. Hold antipsychotics if QTc >500  >Medical: No acute concerns. No consultations needed at this time. No indication for CIWA. Patient with stable VS, no visible physical symptoms of withdrawal. During the course of treatment, will collaborate with medical team to manage medical issues.  >Diet: Regular  >Social: Milieu/structured therapy  >Treatment Interventions: Groups and Individual Therapy/CBT.  >Dispo: Collateral and dispo planning pending further symptom and medication optimization

## 2023-03-01 PROCEDURE — 99232 SBSQ HOSP IP/OBS MODERATE 35: CPT

## 2023-03-01 RX ORDER — RISPERIDONE 4 MG/1
1 TABLET ORAL DAILY
Refills: 0 | Status: DISCONTINUED | OUTPATIENT
Start: 2023-03-02 | End: 2023-03-02

## 2023-03-01 RX ORDER — RISPERIDONE 4 MG/1
2 TABLET ORAL AT BEDTIME
Refills: 0 | Status: DISCONTINUED | OUTPATIENT
Start: 2023-03-01 | End: 2023-03-02

## 2023-03-01 RX ORDER — PALIPERIDONE 1.5 MG/1
156 TABLET, EXTENDED RELEASE ORAL
Qty: 1 | Refills: 0
Start: 2023-03-01

## 2023-03-01 RX ADMIN — RISPERIDONE 1 MILLIGRAM(S): 4 TABLET ORAL at 08:40

## 2023-03-01 RX ADMIN — LITHIUM CARBONATE 900 MILLIGRAM(S): 300 TABLET, EXTENDED RELEASE ORAL at 20:51

## 2023-03-01 RX ADMIN — RISPERIDONE 2 MILLIGRAM(S): 4 TABLET ORAL at 20:51

## 2023-03-01 NOTE — BH INPATIENT PSYCHIATRY PROGRESS NOTE - NSBHASSESSSUMMFT_PSY_ALL_CORE
Patient is a 23 yo male, single, noncaregiver, domiciled with parents, unemployed, enrolled into [employer], with PPH of psychosis, 1 past psych admission for psychosis ([facility] in 2021), noncompliant with outpatient treatment, no known suicide attempts, no significant medical history, +history of punching walls in the context of psychiatric decompensation, +cannabis use, brought in by mother for acute psychosis in context of medication noncompliance. Patient was admitted to [facility] on 9.39 legal status. He is currently a danger to self, is unable to fend for self in the community and meets criteria for inpatient hospitalization.      Working Diagnoses:  Bipolar disorder, with psychosis  Cannabis use disorder  R/o PTSD, SAD, bipolar type    Plan:  >Legal: 9.39  >Obs: Routine observation, low risk for suicide and is able to engage in safety planning    >Psychiatric Meds:  Increase Risperidone 1 mg AM, 2 mg HS for psychosis  Continue Lithium  mg, HS for mood    PRN medications:  Lorazepam 2 mg, Q6H PRN for agitation/anxiety  Lorazepam 2 mg IM, once PRN for agitation.  Haldol 5 mg IM, once PRN for agitation  Haldol 5 mg PO, Q6H PRN for agitation  Benadryl 50 mg PO, Q6H PRN for agitation  Benadryl 50 mg IM, once PRN for agitation    >Labs: Admission labs reviewed, no acute findings. Hold antipsychotics if QTc >500  >Medical: No acute concerns. No consultations needed at this time. No indication for CIWA. Patient with stable VS, no visible physical symptoms of withdrawal. During the course of treatment, will collaborate with medical team to manage medical issues.  >Diet: Regular  >Social: Milieu/structured therapy  >Treatment Interventions: Groups and Individual Therapy/CBT.  >Dispo: Collateral and dispo planning pending further symptom and medication optimization

## 2023-03-01 NOTE — BH INPATIENT PSYCHIATRY PROGRESS NOTE - NSBHCHARTREVIEWVS_PSY_A_CORE FT
Vital Signs Last 24 Hrs  T(C): 36.8 (03-01-23 @ 14:39), Max: 36.8 (03-01-23 @ 14:39)  T(F): 98.3 (03-01-23 @ 14:39), Max: 98.3 (03-01-23 @ 14:39)  HR: --  BP: --  BP(mean): --  RR: 18 (03-01-23 @ 07:39) (18 - 18)  SpO2: --    Orthostatic VS  03-01-23 @ 06:13  Lying BP: --/-- HR: --  Sitting BP: 119/74 HR: 83  Standing BP: 111/70 HR: 86  Site: --  Mode: --  Orthostatic VS  02-28-23 @ 19:46  Lying BP: --/-- HR: --  Sitting BP: 130/66 HR: 79  Standing BP: 130/69 HR: 94  Site: --  Mode: --  Orthostatic VS  02-28-23 @ 06:45  Lying BP: --/-- HR: --  Sitting BP: 127/78 HR: 89  Standing BP: 126/74 HR: 101  Site: --  Mode: --  Orthostatic VS  02-27-23 @ 19:34  Lying BP: --/-- HR: --  Sitting BP: 133/90 HR: 81  Standing BP: 117/83 HR: 94  Site: --  Mode: electronic

## 2023-03-02 PROCEDURE — 99232 SBSQ HOSP IP/OBS MODERATE 35: CPT

## 2023-03-02 RX ORDER — RISPERIDONE 4 MG/1
1 TABLET ORAL
Refills: 0 | Status: DISCONTINUED | OUTPATIENT
Start: 2023-03-02 | End: 2023-03-03

## 2023-03-02 RX ADMIN — RISPERIDONE 1 MILLIGRAM(S): 4 TABLET ORAL at 08:27

## 2023-03-02 NOTE — BH INPATIENT PSYCHIATRY PROGRESS NOTE - NSBHASSESSSUMMFT_PSY_ALL_CORE
Patient is a 23 yo male, single, noncaregiver, domiciled with parents, unemployed, enrolled into [employer], with PPH of psychosis, 1 past psych admission for psychosis ([facility] in 2021), noncompliant with outpatient treatment, no known suicide attempts, no significant medical history, +history of punching walls in the context of psychiatric decompensation, +cannabis use, brought in by mother for acute psychosis in context of medication noncompliance. Patient was admitted to [facility] on 9.39 legal status. He is currently a danger to self, is unable to fend for self in the community and meets criteria for inpatient hospitalization.      Working Diagnoses:  Bipolar disorder, with psychosis  Cannabis use disorder  R/o PTSD, SAD, bipolar type    Plan:  >Legal: 9.39  >Obs: Routine observation, low risk for suicide and is able to engage in safety planning    >Psychiatric Meds:  Decrease Risperidone 1 mg, BID for psychosis  Continue Lithium  mg, HS for mood    PRN medications:  Lorazepam 2 mg, Q6H PRN for agitation/anxiety  Lorazepam 2 mg IM, once PRN for agitation.  Haldol 5 mg IM, once PRN for agitation  Haldol 5 mg PO, Q6H PRN for agitation  Benadryl 50 mg PO, Q6H PRN for agitation  Benadryl 50 mg IM, once PRN for agitation    >Labs: Admission labs reviewed, no acute findings. Hold antipsychotics if QTc >500  >Medical: No acute concerns. No consultations needed at this time. No indication for CIWA. Patient with stable VS, no visible physical symptoms of withdrawal. During the course of treatment, will collaborate with medical team to manage medical issues.  >Diet: Regular  >Social: Milieu/structured therapy  >Treatment Interventions: Groups and Individual Therapy/CBT.  >Dispo: Collateral and dispo planning pending further symptom and medication optimization

## 2023-03-02 NOTE — BH INPATIENT PSYCHIATRY PROGRESS NOTE - NSBHMSESPABN_PSY_A_CORE
Decreased productivity/Increased latency

## 2023-03-02 NOTE — BH INPATIENT PSYCHIATRY PROGRESS NOTE - NSBHCHARTREVIEWVS_PSY_A_CORE FT
Vital Signs Last 24 Hrs  T(C): 36.5 (03-02-23 @ 06:31), Max: 36.8 (03-01-23 @ 14:39)  T(F): 97.7 (03-02-23 @ 06:31), Max: 98.3 (03-01-23 @ 14:39)  HR: --  BP: --  BP(mean): --  RR: 18 (03-02-23 @ 07:57) (18 - 18)  SpO2: --    Orthostatic VS  03-02-23 @ 06:31  Lying BP: --/-- HR: --  Sitting BP: 132/72 HR: 80  Standing BP: 130/77 HR: 90  Site: --  Mode: --  Orthostatic VS  03-01-23 @ 19:17  Lying BP: --/-- HR: --  Sitting BP: 122/80 HR: 92  Standing BP: 120/74 HR: 106  Site: --  Mode: --  Orthostatic VS  03-01-23 @ 06:13  Lying BP: --/-- HR: --  Sitting BP: 119/74 HR: 83  Standing BP: 111/70 HR: 86  Site: --  Mode: --  Orthostatic VS  02-28-23 @ 19:46  Lying BP: --/-- HR: --  Sitting BP: 130/66 HR: 79  Standing BP: 130/69 HR: 94  Site: --  Mode: --

## 2023-03-02 NOTE — BH INPATIENT PSYCHIATRY PROGRESS NOTE - CURRENT MEDICATION
MEDICATIONS  (STANDING):  lithium CR (ESKALITH-CR) 900 milliGRAM(s) Oral at bedtime  risperiDONE  Disintegrating Tablet 2 milliGRAM(s) Oral at bedtime  risperiDONE  Disintegrating Tablet 1 milliGRAM(s) Oral daily    MEDICATIONS  (PRN):  diphenhydrAMINE 50 milliGRAM(s) Oral every 6 hours PRN EPS ppx.  haloperidol     Tablet 5 milliGRAM(s) Oral every 6 hours PRN agitation  haloperidol    Injectable 5 milliGRAM(s) IntraMuscular Once PRN severe agitation  LORazepam     Tablet 2 milliGRAM(s) Oral every 6 hours PRN Agitation  LORazepam   Injectable 2 milliGRAM(s) IntraMuscular Once PRN severe agitation  melatonin. 3 milliGRAM(s) Oral at bedtime PRN Insomnia

## 2023-03-03 LAB
ALBUMIN SERPL ELPH-MCNC: 4.6 G/DL — SIGNIFICANT CHANGE UP (ref 3.3–5)
ALP SERPL-CCNC: 64 U/L — SIGNIFICANT CHANGE UP (ref 40–120)
ALT FLD-CCNC: 23 U/L — SIGNIFICANT CHANGE UP (ref 4–41)
ANION GAP SERPL CALC-SCNC: 9 MMOL/L — SIGNIFICANT CHANGE UP (ref 7–14)
AST SERPL-CCNC: 18 U/L — SIGNIFICANT CHANGE UP (ref 4–40)
BASOPHILS # BLD AUTO: 0.02 K/UL — SIGNIFICANT CHANGE UP (ref 0–0.2)
BASOPHILS NFR BLD AUTO: 0.3 % — SIGNIFICANT CHANGE UP (ref 0–2)
BILIRUB SERPL-MCNC: 0.4 MG/DL — SIGNIFICANT CHANGE UP (ref 0.2–1.2)
BUN SERPL-MCNC: 14 MG/DL — SIGNIFICANT CHANGE UP (ref 7–23)
CALCIUM SERPL-MCNC: 9.7 MG/DL — SIGNIFICANT CHANGE UP (ref 8.4–10.5)
CHLORIDE SERPL-SCNC: 103 MMOL/L — SIGNIFICANT CHANGE UP (ref 98–107)
CO2 SERPL-SCNC: 27 MMOL/L — SIGNIFICANT CHANGE UP (ref 22–31)
CREAT SERPL-MCNC: 1.01 MG/DL — SIGNIFICANT CHANGE UP (ref 0.5–1.3)
EGFR: 106 ML/MIN/1.73M2 — SIGNIFICANT CHANGE UP
EOSINOPHIL # BLD AUTO: 0.27 K/UL — SIGNIFICANT CHANGE UP (ref 0–0.5)
EOSINOPHIL NFR BLD AUTO: 4.3 % — SIGNIFICANT CHANGE UP (ref 0–6)
GLUCOSE SERPL-MCNC: 72 MG/DL — SIGNIFICANT CHANGE UP (ref 70–99)
HCT VFR BLD CALC: 42.6 % — SIGNIFICANT CHANGE UP (ref 39–50)
HGB BLD-MCNC: 13.5 G/DL — SIGNIFICANT CHANGE UP (ref 13–17)
IANC: 3.91 K/UL — SIGNIFICANT CHANGE UP (ref 1.8–7.4)
IMM GRANULOCYTES NFR BLD AUTO: 0.3 % — SIGNIFICANT CHANGE UP (ref 0–0.9)
LITHIUM SERPL-MCNC: 0.8 MMOL/L — SIGNIFICANT CHANGE UP (ref 0.6–1.2)
LYMPHOCYTES # BLD AUTO: 1.68 K/UL — SIGNIFICANT CHANGE UP (ref 1–3.3)
LYMPHOCYTES # BLD AUTO: 26.7 % — SIGNIFICANT CHANGE UP (ref 13–44)
MCHC RBC-ENTMCNC: 26.5 PG — LOW (ref 27–34)
MCHC RBC-ENTMCNC: 31.7 GM/DL — LOW (ref 32–36)
MCV RBC AUTO: 83.7 FL — SIGNIFICANT CHANGE UP (ref 80–100)
MONOCYTES # BLD AUTO: 0.39 K/UL — SIGNIFICANT CHANGE UP (ref 0–0.9)
MONOCYTES NFR BLD AUTO: 6.2 % — SIGNIFICANT CHANGE UP (ref 2–14)
NEUTROPHILS # BLD AUTO: 3.91 K/UL — SIGNIFICANT CHANGE UP (ref 1.8–7.4)
NEUTROPHILS NFR BLD AUTO: 62.2 % — SIGNIFICANT CHANGE UP (ref 43–77)
NRBC # BLD: 0 /100 WBCS — SIGNIFICANT CHANGE UP (ref 0–0)
NRBC # FLD: 0 K/UL — SIGNIFICANT CHANGE UP (ref 0–0)
PLATELET # BLD AUTO: 176 K/UL — SIGNIFICANT CHANGE UP (ref 150–400)
POTASSIUM SERPL-MCNC: 4.2 MMOL/L — SIGNIFICANT CHANGE UP (ref 3.5–5.3)
POTASSIUM SERPL-SCNC: 4.2 MMOL/L — SIGNIFICANT CHANGE UP (ref 3.5–5.3)
PROT SERPL-MCNC: 7 G/DL — SIGNIFICANT CHANGE UP (ref 6–8.3)
RBC # BLD: 5.09 M/UL — SIGNIFICANT CHANGE UP (ref 4.2–5.8)
RBC # FLD: 13.1 % — SIGNIFICANT CHANGE UP (ref 10.3–14.5)
SODIUM SERPL-SCNC: 139 MMOL/L — SIGNIFICANT CHANGE UP (ref 135–145)
WBC # BLD: 6.29 K/UL — SIGNIFICANT CHANGE UP (ref 3.8–10.5)
WBC # FLD AUTO: 6.29 K/UL — SIGNIFICANT CHANGE UP (ref 3.8–10.5)

## 2023-03-03 PROCEDURE — 99232 SBSQ HOSP IP/OBS MODERATE 35: CPT

## 2023-03-03 RX ORDER — LITHIUM CARBONATE 300 MG/1
2 TABLET, EXTENDED RELEASE ORAL
Qty: 60 | Refills: 0
Start: 2023-03-03 | End: 2023-04-01

## 2023-03-03 RX ORDER — RISPERIDONE 4 MG/1
2 TABLET ORAL AT BEDTIME
Refills: 0 | Status: DISCONTINUED | OUTPATIENT
Start: 2023-03-03 | End: 2023-03-03

## 2023-03-03 RX ORDER — RISPERIDONE 4 MG/1
1 TABLET ORAL
Qty: 30 | Refills: 0
Start: 2023-03-03 | End: 2023-04-01

## 2023-03-03 RX ADMIN — RISPERIDONE 1 MILLIGRAM(S): 4 TABLET ORAL at 08:26

## 2023-03-03 RX ADMIN — LITHIUM CARBONATE 900 MILLIGRAM(S): 300 TABLET, EXTENDED RELEASE ORAL at 21:03

## 2023-03-03 RX ADMIN — RISPERIDONE 2 MILLIGRAM(S): 4 TABLET ORAL at 21:03

## 2023-03-03 NOTE — BH INPATIENT PSYCHIATRY PROGRESS NOTE - NSBHCHARTREVIEWVS_PSY_A_CORE FT
Vital Signs Last 24 Hrs  T(C): 36.1 (03-03-23 @ 06:32), Max: 36.3 (03-02-23 @ 19:21)  T(F): 97 (03-03-23 @ 06:32), Max: 97.4 (03-02-23 @ 19:21)  HR: --  BP: --  BP(mean): --  RR: --  SpO2: --    Orthostatic VS  03-03-23 @ 08:53  Lying BP: --/-- HR: --  Sitting BP: 125/78 HR: 68  Standing BP: 120/88 HR: 78  Site: --  Mode: electronic  Orthostatic VS  03-02-23 @ 19:21  Lying BP: --/-- HR: --  Sitting BP: 129/76 HR: 82  Standing BP: 131/77 HR: 91  Site: --  Mode: --  Orthostatic VS  03-02-23 @ 06:31  Lying BP: --/-- HR: --  Sitting BP: 132/72 HR: 80  Standing BP: 130/77 HR: 90  Site: --  Mode: --  Orthostatic VS  03-01-23 @ 19:17  Lying BP: --/-- HR: --  Sitting BP: 122/80 HR: 92  Standing BP: 120/74 HR: 106  Site: --  Mode: --

## 2023-03-03 NOTE — BH DISCHARGE NOTE NURSING/SOCIAL WORK/PSYCH REHAB - NSCDUDCCRISIS_PSY_A_CORE
[facility]  [phone] [facility]-WELL ([phone])  Text "WELL" to [phone]  Website: [email or web address]  [facility]  [address] [place] [id number] [phone]   Hours:  Monday through Friday from 9 AM to 3 PM/[facility]

## 2023-03-03 NOTE — BH INPATIENT PSYCHIATRY PROGRESS NOTE - OTHER
illogical at times

## 2023-03-03 NOTE — BH INPATIENT PSYCHIATRY PROGRESS NOTE - NSBHASSESSSUMMFT_PSY_ALL_CORE
Patient is a 23 yo male, single, noncaregiver, domiciled with parents, unemployed, enrolled into [employer], with PPH of psychosis, 1 past psych admission for psychosis ([facility] in 2021), noncompliant with outpatient treatment, no known suicide attempts, no significant medical history, +history of punching walls in the context of psychiatric decompensation, +cannabis use, brought in by mother for acute psychosis in context of medication noncompliance. Patient was admitted to [facility] on 9.39 legal status. He is currently a danger to self, is unable to fend for self in the community and meets criteria for inpatient hospitalization.      Working Diagnoses:  Bipolar disorder, with psychosis  Cannabis use disorder  R/o PTSD, SAD, bipolar type    Plan:  >Legal: 9.39  >Obs: Routine observation, low risk for suicide and is able to engage in safety planning    >Psychiatric Meds:  Decrease Risperidone 1 mg, BID for psychosis  Continue Lithium  mg, HS for mood    PRN medications:  Lorazepam 2 mg, Q6H PRN for agitation/anxiety  Lorazepam 2 mg IM, once PRN for agitation.  Haldol 5 mg IM, once PRN for agitation  Haldol 5 mg PO, Q6H PRN for agitation  Benadryl 50 mg PO, Q6H PRN for agitation  Benadryl 50 mg IM, once PRN for agitation    >Labs: Admission labs reviewed, no acute findings. Hold antipsychotics if QTc >500  >Medical: No acute concerns. No consultations needed at this time. No indication for CIWA. Patient with stable VS, no visible physical symptoms of withdrawal. During the course of treatment, will collaborate with medical team to manage medical issues.  >Diet: Regular  >Social: Milieu/structured therapy  >Treatment Interventions: Groups and Individual Therapy/CBT.  >Dispo: Collateral and dispo planning pending further symptom and medication optimization Patient is a 23 yo male, single, noncaregiver, domiciled with parents, unemployed, enrolled [employer], with PPH of psychosis, 1 past psych admission for psychosis ([facility] in 2021), noncompliant with outpatient treatment, no known suicide attempts, no significant medical history, +history of punching walls in the context of psychiatric decompensation, +cannabis use, brought in by mother for acute psychosis in context of medication noncompliance. Patient was admitted to [facility] on 9.39 legal status. He is currently a danger to self, is unable to fend for self in the community and meets criteria for inpatient hospitalization.      Working Diagnoses:  Bipolar disorder, with psychosis  Cannabis use disorder  R/o PTSD, SAD, bipolar type    Plan:  >Legal: 9.39  >Obs: Routine observation, low risk for suicide and is able to engage in safety planning    >Psychiatric Meds:  Continue Risperidone 2 mg, HS for psychosis  Continue Lithium  mg, HS for mood    PRN medications:  Lorazepam 2 mg, Q6H PRN for agitation/anxiety  Lorazepam 2 mg IM, once PRN for agitation.  Haldol 5 mg IM, once PRN for agitation  Haldol 5 mg PO, Q6H PRN for agitation  Benadryl 50 mg PO, Q6H PRN for agitation  Benadryl 50 mg IM, once PRN for agitation    >Labs: Admission labs reviewed, no acute findings. Hold antipsychotics if QTc >500  >Medical: No acute concerns. No consultations needed at this time. No indication for CIWA. Patient with stable VS, no visible physical symptoms of withdrawal. During the course of treatment, will collaborate with medical team to manage medical issues.  >Diet: Regular  >Social: Milieu/structured therapy  >Treatment Interventions: Groups and Individual Therapy/CBT.  >Dispo: Collateral and dispo planning pending further symptom and medication optimization

## 2023-03-03 NOTE — BH DISCHARGE NOTE NURSING/SOCIAL WORK/PSYCH REHAB - NSDCPRRECOMMEND_PSY_ALL_CORE
Please follow up your treatment with [facility] Crisis first appointment on 3/7/2023 at 1:30 PM.

## 2023-03-03 NOTE — BH INPATIENT PSYCHIATRY PROGRESS NOTE - CURRENT MEDICATION
MEDICATIONS  (STANDING):  lithium CR (ESKALITH-CR) 900 milliGRAM(s) Oral at bedtime  risperiDONE  Disintegrating Tablet 1 milliGRAM(s) Oral <User Schedule>    MEDICATIONS  (PRN):  diphenhydrAMINE 50 milliGRAM(s) Oral every 6 hours PRN EPS ppx.  haloperidol     Tablet 5 milliGRAM(s) Oral every 6 hours PRN agitation  haloperidol    Injectable 5 milliGRAM(s) IntraMuscular Once PRN severe agitation  LORazepam     Tablet 2 milliGRAM(s) Oral every 6 hours PRN Agitation  LORazepam   Injectable 2 milliGRAM(s) IntraMuscular Once PRN severe agitation  melatonin. 3 milliGRAM(s) Oral at bedtime PRN Insomnia   MEDICATIONS  (STANDING):  lithium CR (ESKALITH-CR) 900 milliGRAM(s) Oral at bedtime  risperiDONE  Disintegrating Tablet 2 milliGRAM(s) Oral at bedtime    MEDICATIONS  (PRN):  diphenhydrAMINE 50 milliGRAM(s) Oral every 6 hours PRN EPS ppx.  haloperidol     Tablet 5 milliGRAM(s) Oral every 6 hours PRN agitation  haloperidol    Injectable 5 milliGRAM(s) IntraMuscular Once PRN severe agitation  LORazepam     Tablet 2 milliGRAM(s) Oral every 6 hours PRN Agitation  LORazepam   Injectable 2 milliGRAM(s) IntraMuscular Once PRN severe agitation  melatonin. 3 milliGRAM(s) Oral at bedtime PRN Insomnia

## 2023-03-03 NOTE — BH DISCHARGE NOTE NURSING/SOCIAL WORK/PSYCH REHAB - NSDCPRGOAL_PSY_ALL_CORE
Pt made some progress during his hospitalization. Pt has been compliant with medication during this hospitalization. Pt has verbally agreed to comply with medication post-discharge. Pt has identified benefits of medication compliance such as increase energy, sleep better, and no more paranoia. Pt has identified his coping strategies such as talk to parents, play video game, go out and play basketball, and listen to music to manage symptoms. Pt currently denies SI, HI, AH, and VH. Pt was observed to be less internally preoccupied towards his discharge.  During this hospitalization, pt showed approximately 90% of group attendance. During the group session, pt was able to tolerate group structure, participated with some prompting. Pt was visible on the unit, showed improvement on interactions with others. Pt maintained fair ADLs. Pt has participated in his safety plan. Pt was provided with press ganey survey.

## 2023-03-03 NOTE — BH DISCHARGE NOTE NURSING/SOCIAL WORK/PSYCH REHAB - DISCHARGE INSTRUCTIONS AFTERCARE APPOINTMENTS
In order to check the location, date, or time of your aftercare appointment, please refer to your Discharge Instructions Document given to you upon leaving the hospital.  If you have lost the instructions please call [phone]

## 2023-03-03 NOTE — BH DISCHARGE NOTE NURSING/SOCIAL WORK/PSYCH REHAB - NSBHDCADDR1FT_A_CORE
[address] [place] [address] [place] [address], [facility]

## 2023-03-03 NOTE — BH DISCHARGE NOTE NURSING/SOCIAL WORK/PSYCH REHAB - PATIENT PORTAL LINK FT
You can access the FollowMyHealth Patient Portal offered by [facility] by registering at the following website: [email or web address]. By joining [employer]’s FollowMyHealth portal, you will also be able to view your health information using other applications (apps) compatible with our system.

## 2023-03-04 RX ADMIN — LITHIUM CARBONATE 900 MILLIGRAM(S): 300 TABLET, EXTENDED RELEASE ORAL at 20:11

## 2023-03-04 RX ADMIN — RISPERIDONE 2 MILLIGRAM(S): 4 TABLET ORAL at 20:11

## 2023-03-05 RX ADMIN — RISPERIDONE 2 MILLIGRAM(S): 4 TABLET ORAL at 20:16

## 2023-03-05 RX ADMIN — LITHIUM CARBONATE 900 MILLIGRAM(S): 300 TABLET, EXTENDED RELEASE ORAL at 20:16

## 2023-03-06 VITALS — TEMPERATURE: 97 F

## 2023-03-06 NOTE — BH INPATIENT PSYCHIATRY PROGRESS NOTE - NSBHATTESTBILLING_PSY_A_CORE
84988-Cspjrafvux OBS or IP - moderate complexity OR 35-49 mins
[id number]-Umxmytmstm OBS or IP - moderate complexity OR 35-49 mins
[id number]-Sifvccfxkt OBS or IP - moderate complexity OR 35-49 mins
[id number]-Dwkjmbhypk OBS or IP - moderate complexity OR 35-49 mins
[id number]-Hnxkjiibwz OBS or IP - moderate complexity OR 35-49 mins
[id number]-Yqniqyjkju OBS or IP - moderate complexity OR 35-49 mins
[id number]-Lfqnygtnxn OBS or IP - moderate complexity OR 35-49 mins
[id number]-Nzyezormuj OBS or IP - moderate complexity OR 35-49 mins
[id number]-Qxcuisfrfu OBS or IP - moderate complexity OR 35-49 mins
[id number]-Uvmgiwwknf OBS or IP - moderate complexity OR 35-49 mins

## 2023-03-06 NOTE — BH INPATIENT PSYCHIATRY PROGRESS NOTE - ATTENDING COMMENTS
"I independently performed the documented Medical Decision Making" is selected to indicate that I discussed the patient with the treatment team and reviewed and revised the medications and treatment plan with the ACP. 

## 2023-03-06 NOTE — BH INPATIENT PSYCHIATRY PROGRESS NOTE - NSTXDCOTHRGOAL_PSY_ALL_CORE
Pt will show a reduction of disorganization and engage meaningfully with writer to identify a safe discharge plan. Pt will comply with recommended tx plan and medications for 5 days, identify 2 benefits for adhering to tx.
The pt. will show a reduction of disorganization and engage meaningfully to identify a safe discharge plan.  Pt. will comply with the recommended tx. plan and medications for five days. and identify two benefits from adhering to tx.
Pt will show a reduction of disorganization and engage meaningfully with writer to identify a safe discharge plan. Pt will comply with recommended tx plan and medications for 5 days, identify 2 benefits for adhering to tx.
The pt. will show a reduction of disorganization and engage meaningfully to identify a safe discharge plan.  Pt. will comply with the recommended tx. plan and medications for five days. and identify two benefits from adhering to tx.
Pt will show a reduction of disorganization and engage meaningfully with writer to identify a safe discharge plan. Pt will comply with recommended tx plan and medications for 5 days, identify 2 benefits for adhering to tx.

## 2023-03-06 NOTE — BH INPATIENT PSYCHIATRY DISCHARGE NOTE - ATTENDING DISCHARGE PHYSICAL EXAMINATION:
On day of discharge, patient is calm and cooperative, reports stable mood and decreased anxiety, affect remains blunted, taking medication, reporting benefit in reduced distress and improved anxiety, expresses intention to continue taking medication on discharge, appropriately interacting with peers, decreased paranoia and internal preoccupation, denies AH/VH and no evidence of response to internal stimuli, denies SI/HI and engages in appropriate safety planning, more linear and spontaneous than on admission, somewhat improved insight and judgment, although chronically elevated risk, acute risk is sufficiently reduced to no longer require inpatient psychiatric hospitalization, appropriate for discharge with outpatient follow-up as arranged.

## 2023-03-06 NOTE — BH INPATIENT PSYCHIATRY PROGRESS NOTE - NSTXMEDICGOAL_PSY_ALL_CORE
Be able to describe the benefit of medication/treatment
Take all medications as prescribed
Be able to describe the benefit of medication/treatment
Be able to describe the benefit of medication/treatment
Take all medications as prescribed
Be able to describe the benefit of medication/treatment
Take all medications as prescribed

## 2023-03-06 NOTE — BH INPATIENT PSYCHIATRY PROGRESS NOTE - NSICDXBHSECONDARYDX_PSY_ALL_CORE
Cannabis abuse   F12.10  
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
## 2023-03-06 NOTE — BH INPATIENT PSYCHIATRY PROGRESS NOTE - CURRENT MEDICATION
MEDICATIONS  (STANDING):  lithium CR (ESKALITH-CR) 900 milliGRAM(s) Oral at bedtime  risperiDONE  Disintegrating Tablet 2 milliGRAM(s) Oral at bedtime    MEDICATIONS  (PRN):  diphenhydrAMINE 50 milliGRAM(s) Oral every 6 hours PRN EPS ppx.  haloperidol     Tablet 5 milliGRAM(s) Oral every 6 hours PRN agitation  haloperidol    Injectable 5 milliGRAM(s) IntraMuscular Once PRN severe agitation  melatonin. 3 milliGRAM(s) Oral at bedtime PRN Insomnia

## 2023-03-06 NOTE — BH INPATIENT PSYCHIATRY PROGRESS NOTE - NSTXMEDICDATEEST_PSY_ALL_CORE
22-Feb-2023
01-Mar-2023
22-Feb-2023
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
## 2023-03-06 NOTE — BH INPATIENT PSYCHIATRY PROGRESS NOTE - NSBHMETABOLIC_PSY_ALL_CORE_FT
BMI: BMI (kg/m2): 21.7 (02-21-23 @ 16:45)  HbA1c:   Glucose:   BP: --  Lipid Panel: 
BMI: BMI (kg/m2): 22.2 (03-05-23 @ 16:51)  HbA1c:   Glucose:   BP: --  Lipid Panel: 
BMI: BMI (kg/m2): 21.7 (02-21-23 @ 16:45)  HbA1c:   Glucose:   BP: --  Lipid Panel: 
BMI: BMI (kg/m2): 21.7 (02-21-23 @ 16:45)  HbA1c:   Glucose:   BP: --  Lipid Panel: 
BMI: BMI (kg/m2): 21.7 (02-21-23 @ 16:45)  HbA1c:   Glucose:   BP: 111/83 (02-21-23 @ 16:28) (111/83 - 133/84)  Lipid Panel: 
BMI: BMI (kg/m2): 21.7 (02-21-23 @ 16:45)  HbA1c:   Glucose:   BP: 111/83 (02-21-23 @ 16:28) (111/83 - 111/83)  Lipid Panel: 
BMI: BMI (kg/m2): 21.7 (02-21-23 @ 16:45)  HbA1c:   Glucose:   BP: --  Lipid Panel:

## 2023-03-06 NOTE — BH INPATIENT PSYCHIATRY PROGRESS NOTE - NSTXDCOTHRDATETRGT_PSY_ALL_CORE
01-Mar-2023
09-Mar-2023
01-Mar-2023
01-Mar-2023
09-Mar-2023
01-Mar-2023
09-Mar-2023
09-Mar-2023

## 2023-03-06 NOTE — BH INPATIENT PSYCHIATRY PROGRESS NOTE - NSBHMSESPEECH_PSY_A_CORE
Normal volume, rate, productivity, spontaneity and articulation
Abnormal as indicated, otherwise normal...
Normal volume, rate, productivity, spontaneity and articulation
Abnormal as indicated, otherwise normal...

## 2023-03-06 NOTE — BH INPATIENT PSYCHIATRY PROGRESS NOTE - NSBHATTESTATTENDPERFORM_PSY_A_CORE
Medical Decision Making

## 2023-03-06 NOTE — BH INPATIENT PSYCHIATRY PROGRESS NOTE - PRN MEDS
MEDICATIONS  (PRN):  diphenhydrAMINE 50 milliGRAM(s) Oral every 6 hours PRN EPS ppx.  haloperidol     Tablet 5 milliGRAM(s) Oral every 6 hours PRN agitation  haloperidol    Injectable 5 milliGRAM(s) IntraMuscular Once PRN severe agitation  LORazepam     Tablet 2 milliGRAM(s) Oral every 6 hours PRN Agitation  LORazepam   Injectable 2 milliGRAM(s) IntraMuscular Once PRN severe agitation  melatonin. 3 milliGRAM(s) Oral at bedtime PRN Insomnia  
MEDICATIONS  (PRN):  diphenhydrAMINE 50 milliGRAM(s) Oral every 6 hours PRN EPS ppx.  haloperidol     Tablet 5 milliGRAM(s) Oral every 6 hours PRN agitation  haloperidol    Injectable 5 milliGRAM(s) IntraMuscular Once PRN severe agitation  melatonin. 3 milliGRAM(s) Oral at bedtime PRN Insomnia  
MEDICATIONS  (PRN):  diphenhydrAMINE 50 milliGRAM(s) Oral every 6 hours PRN EPS ppx.  haloperidol     Tablet 5 milliGRAM(s) Oral every 6 hours PRN agitation  haloperidol    Injectable 5 milliGRAM(s) IntraMuscular Once PRN severe agitation  LORazepam     Tablet 2 milliGRAM(s) Oral every 6 hours PRN Agitation  LORazepam   Injectable 2 milliGRAM(s) IntraMuscular Once PRN severe agitation  melatonin. 3 milliGRAM(s) Oral at bedtime PRN Insomnia

## 2023-03-06 NOTE — BH INPATIENT PSYCHIATRY PROGRESS NOTE - NSTXMEDICDATETRGT_PSY_ALL_CORE
01-Mar-2023
06-Mar-2023
01-Mar-2023

## 2023-03-06 NOTE — BH INPATIENT PSYCHIATRY PROGRESS NOTE - NSBHMSEPERCEPT_PSY_A_CORE
No abnormalities
Auditory hallucinations
No abnormalities
Auditory hallucinations
No abnormalities
Auditory hallucinations

## 2023-03-06 NOTE — BH INPATIENT PSYCHIATRY PROGRESS NOTE - NSTXPSYCHODATEEST_PSY_ALL_CORE
22-Feb-2023

## 2023-03-06 NOTE — BH INPATIENT PSYCHIATRY PROGRESS NOTE - NSBHMSETHTPROC_PSY_A_CORE
Disorganized/Impaired reasoning/Other
Linear
Disorganized/Impaired reasoning/Other
Disorganized/Impaired reasoning/Other
Linear/Impaired reasoning/Other
Disorganized/Impaired reasoning/Other
Linear/Impaired reasoning/Other
Disorganized/Impaired reasoning/Other

## 2023-03-06 NOTE — BH INPATIENT PSYCHIATRY DISCHARGE NOTE - NSDCMRMEDTOKEN_GEN_ALL_CORE_FT
Sridhar Purienna 156 mg/mL intramuscular suspension, extended release: 156 milligram(s) intramuscularly every 4 weeks   lithium 450 mg oral tablet, extended release: 2 tab(s) orally once a day (at bedtime)  risperiDONE 2 mg oral tablet: 1 tab(s) orally once a day (at bedtime)

## 2023-03-06 NOTE — BH INPATIENT PSYCHIATRY PROGRESS NOTE - NSDCCRITERIA_PSY_ALL_CORE
symptoms improvement

## 2023-03-06 NOTE — BH INPATIENT PSYCHIATRY DISCHARGE NOTE - HPI (INCLUDE ILLNESS QUALITY, SEVERITY, DURATION, TIMING, CONTEXT, MODIFYING FACTORS, ASSOCIATED SIGNS AND SYMPTOMS)
Patient is a 23 yo male, single, noncaregiver, domiciled with parents, unemployed, enrolled into [employer], with PPH of psychosis, 1 past psych admission for psychosis ([facility] in 2021), noncompliant with outpatient treatment, no known suicide attempts, no significant medical history, +history of punching walls in the context of psychiatric decompensation, +cannabis use, brought in by mother for acute psychosis in context of medication noncompliance.     As per ED behavioral health assessment note:  "On interview, pt is vague, easily distracted, internally preoccupied, with increased speech latency. Pt doesn't know why he is in the ED. He is able to state that he is hearing voices. He describes the AH as "music." He denies command AH. He denies VH. He feels people might be watching him but he is unable/unwilling to elaborate. He reports "sometimes" having SI. States he most recently had suicidal thoughts yesterday (2/20/23). He denies plan or intent. He denies homicidal or violent ideation, intent, or plan. States he hasn't slept in days but can't explain why. He denies substance use."    As per ED Behavioral Health note:   "Patient is a 22-year-old male, domiciled with parents and sister, a college student at [employer], psychiatrically hospitalized two years ago for similar presentation, bib accompanied by mother for pt decrease appetite and not sleeping Patient is not currently connected to a psychiatrist and is not on medications. Mother states that the patient smokes marijuana (last use 2-3 days ago), and occasionally drinks alcohol. Mother reports that the patient has not slept in the last 4 days, and someone is telling him something. Patient has been hearing voices and thinks someone is following him. Patient states that everyone keeps rejecting him. Patient also complained of having a headache. Mother states that the patient is not engaging in violent or aggressive behaviors. Patient is not presenting with any SI/HI. Patient similarly had similar symptoms 2 years ago. Patient has no medical issues and has been vaccinated for covid-19. Patient has no current exposure. Mother states that pt has a loss of appetite and she had to force him to eat. Patient has been showering at baseline. No prior SA as per mother. Mother states that the patient starts school on 3/4/2023 and maybe he needs to be on medications. Case discussed with psychiatry."       On unit:  Patient was seen and is evaluated on the unit. He is disorganized in TP, vague, internally preoccupied and thought blocked, otherwise is calm and cooperative with interview. He states that people around him have been following him. When further questioned about this he verbalizes that he does not know who is following him and what their intentions are. Does express "maybe they want to save me from something." He reports having trouble sleeping for the past 3 days, unable to quantify how much sleep he has been getting. Patient does verbalize being able to sleep on the unit last night. He reports that he spends most of his day playing video games, often into the night. Patient also reports having poor appetite over the past week. He states that he is enrolled at [employer] and will be starting classes next month, majoring in [employer] science. Patient is difficult to follow at times, answering questions with illogical statements. He does verbalize being noncompliant with treatment after last hospitalization. States that he self-discontinued as the medications made him feel "dizzy and tired". Patient denies any SIB/SI plan or intent, no prior hx of NSSIB or SA. Of note, patient did endorse SI while he was in the ED. He denies any HI, plan or intent, has prior hx of aggressive behavior towards property (punching walls). Patient denies psychotic symptoms such as VH, magical thinking, thought insertion/broadcasting, ideas of reference. He does endorse paranoia and AH, states that he can hear voices that tell him "bad words" in English and Telugu (native tongue), also hears music, he denies any CAH. Patient denies symptoms of depression such as dysphoric mood, suicidal ideation, anhedonia, anergia, social isolation, crying spells, feelings of hopelessness/helplessness/worthlessness/guilt. He denies symptoms associated with bethany such racing thoughts, mood lability, distractibility, tangentiality, and impulsivity. Patient denies any hx of sexual/physical/emotional abuse. Denies symptoms associated with PTSD such as nightmares, hypervigilance, dissociation, and flashbacks. He denies drinking alcohol, reports hx of cannabis use approx. 1-2 joints EOD, he denies any cigarette use. No prior admissions into substance rehab/detox.

## 2023-03-06 NOTE — BH INPATIENT PSYCHIATRY PROGRESS NOTE - NSTXPSYCHOGOAL_PSY_ALL_CORE
Will be able to report experiencing hallucinations to staff
Will report using a mindfulness skill to be able to read 5 pages of a book daily despite hallucinations
Will report using a mindfulness skill to be able to read 5 pages of a book daily despite hallucinations
Will be able to report experiencing hallucinations to staff
Will report using a mindfulness skill to be able to read 5 pages of a book daily despite hallucinations

## 2023-03-06 NOTE — BH INPATIENT PSYCHIATRY PROGRESS NOTE - NSTXPSYCHODATETRGT_PSY_ALL_CORE
01-Mar-2023
08-Mar-2023
01-Mar-2023
08-Mar-2023
01-Mar-2023
01-Mar-2023
08-Mar-2023
01-Mar-2023
01-Mar-2023
08-Mar-2023

## 2023-03-06 NOTE — BH INPATIENT PSYCHIATRY PROGRESS NOTE - NSBHATTESTTYPEVISIT_PSY_A_CORE
On-site Attending supervising EDUARD (99XXX codes)
On-site Attending supervising EDUARD (99XXX codes)
EDUARD without on-site Attending supervision
On-site Attending supervising EDUARD (99XXX codes)
EDUARD without on-site Attending supervision
On-site Attending supervising EDUARD (99XXX codes)

## 2023-03-06 NOTE — BH INPATIENT PSYCHIATRY PROGRESS NOTE - NSBHMSETHTCONTENT_PSY_A_CORE
Delusions
Delusions
Unremarkable
Delusions

## 2023-03-06 NOTE — BH INPATIENT PSYCHIATRY PROGRESS NOTE - NSBHFUPINTERVALCCFT_PSY_A_CORE
f/u for psychosis

## 2023-03-06 NOTE — BH INPATIENT PSYCHIATRY PROGRESS NOTE - NSBHMSEAFFCONG_PSY_A_CORE
Congruent
Attending Attestation (For Attendings USE Only)...
Congruent

## 2023-03-06 NOTE — BH INPATIENT PSYCHIATRY PROGRESS NOTE - NSBHCHARTREVIEWVS_PSY_A_CORE FT
Vital Signs Last 24 Hrs  T(C): 36.2 (03-06-23 @ 06:25), Max: 36.4 (03-05-23 @ 13:57)  T(F): 97.2 (03-06-23 @ 06:25), Max: 97.6 (03-05-23 @ 13:57)  HR: --  BP: --  BP(mean): --  RR: --  SpO2: --    Orthostatic VS  03-06-23 @ 08:31  Lying BP: --/-- HR: --  Sitting BP: 109/71 HR: 84  Standing BP: 110/62 HR: 95  Site: --  Mode: --  Orthostatic VS  03-05-23 @ 18:55  Lying BP: --/-- HR: --  Sitting BP: 134/73 HR: 74  Standing BP: 129/65 HR: 76  Site: --  Mode: electronic  Orthostatic VS  03-05-23 @ 08:38  Lying BP: --/-- HR: --  Sitting BP: 115/66 HR: 81  Standing BP: 114/66 HR: 102  Site: --  Mode: electronic  Orthostatic VS  03-04-23 @ 19:25  Lying BP: --/-- HR: --  Sitting BP: 118/63 HR: 80  Standing BP: 119/63 HR: 90  Site: --  Mode: electronic

## 2023-03-07 ENCOUNTER — OUTPATIENT (OUTPATIENT)
Dept: OUTPATIENT SERVICES | Facility: HOSPITAL | Age: 23
LOS: 1 days | Discharge: STILL ADMITTED OR ADMINISTRATIVE | End: 2023-03-07
Payer: MEDICAID

## 2023-03-07 DIAGNOSIS — Z98.890 OTHER SPECIFIED POSTPROCEDURAL STATES: Chronic | ICD-10-CM

## 2023-03-10 DIAGNOSIS — F31.73 BIPOLAR DISORDER, IN PARTIAL REMISSION, MOST RECENT EPISODE MANIC: ICD-10-CM

## 2023-03-15 ENCOUNTER — OUTPATIENT (OUTPATIENT)
Dept: OUTPATIENT SERVICES | Facility: HOSPITAL | Age: 23
LOS: 1 days | Discharge: HOME | End: 2023-03-15
Payer: MEDICAID

## 2023-03-15 DIAGNOSIS — Z98.890 OTHER SPECIFIED POSTPROCEDURAL STATES: Chronic | ICD-10-CM

## 2023-03-28 PROCEDURE — 99214 OFFICE O/P EST MOD 30 MIN: CPT | Mod: 95

## 2023-04-04 PROCEDURE — 90792 PSYCH DIAG EVAL W/MED SRVCS: CPT

## 2023-04-17 LAB — LITHIUM SERPL-MCNC: 0.4 MMOL/L — LOW (ref 0.6–1.2)

## 2023-04-17 PROCEDURE — 99214 OFFICE O/P EST MOD 30 MIN: CPT

## 2023-05-03 DIAGNOSIS — F31.64 BIPOLAR DISORDER, CURRENT EPISODE MIXED, SEVERE, WITH PSYCHOTIC FEATURES: ICD-10-CM

## 2023-09-21 PROCEDURE — ZZZZZ: CPT

## 2023-10-24 NOTE — BH PATIENT PROFILE - ABILITY TO HEAR (WITH HEARING AID OR HEARING APPLIANCE IF NORMALLY USED):
----- Message from Keara Pringle sent at 10/24/2023  9:04 AM CDT -----  Contact: KAROLINA SINGH [[id number]]  Type:  Patient Returning Call      Who Called:  KAROLINA SINGH [[id number]]      Who Left Message for Patient: Nicole Wren LPN      Does the patient know what this is regarding?: Yes      Would the patient rather a call back or a response via [facility]?  Call back      Best Call Back Number: [phone] ([place])       Additional Information:      
----- Message from Wilma Xiong MD sent at 10/23/2023  8:22 PM CDT -----  Please call pt as I am unsure if she checks the portal on a regular basis to ensure she receives message:     Xray shows a compression fracture at L1 that appears new when compared to a chest xray from 8/2022. I suspect this is from her recent fall as well. I recommend completing a MRI to better evaluate the extent of the compression fracture and soft tissues around this. There is arthritis lower in the spine. These findings explain her back pain.   Please arrange the MRI at her convenience in the next 1-2 weeks   
Attempted to contact Ms. Kearns to inform her per Dr. Xiong that Xray shows a compression fracture at L1 that appears new when compared to a chest xray from 8/2022. I suspect this is from her recent fall as well. I recommend completing a MRI to better evaluate the extent of the compression fracture and soft tissues around this. There is arthritis lower in the spine. These findings explain her back pain.   Please arrange the MRI at her convenience in the next 1-2 weeks     But no answer.  LVM to call the office.   
Spoke with pt, they had a verbal understanding. She didn't want to schedule the MRI yet. She will call us back.  
Adequate: hears normal conversation without difficulty

## 2023-11-17 NOTE — BH PATIENT PROFILE - NSPROPOAPRESSUREINJURY_GEN_A_NUR
I reviewed the H&P, I examined the patient, and there are no changes in the patient's condition.    no

## 2024-07-01 NOTE — ED PROVIDER NOTE - CROS ED PSYCH ALL NEG
Body Location Override (Optional - Billing Will Still Be Based On Selected Body Map Location If Applicable): left upper back Detail Level: Simple Size Of Lesion In Cm (Optional): 0.3 X Size Of Lesion In Cm (Optional): 0 Morphology Per Location (Optional): brown macule Body Location Override (Optional - Billing Will Still Be Based On Selected Body Map Location If Applicable): right anterior thigh Size Of Lesion In Cm (Optional): 0.4 Morphology Per Location (Optional): 2-color brown macule Body Location Override (Optional - Billing Will Still Be Based On Selected Body Map Location If Applicable): left anterior thigh negative...

## 2024-07-11 NOTE — BH DISCHARGE NOTE NURSING/SOCIAL WORK/PSYCH REHAB - NSBHREFERPURPOSE1_PSY_ALL_CORE
show Mental Health Treatment Please be informed of the followin) Patient must respond to call from Centers team to register by phone prior to scheduled appointment time in order for appointment to take place.  2) It is expected that the patient arrives to the Zoom appointment using the above Zoom ID.  Patient will NOT receive an email with Zoom link or a phone call prompting them to log in.  3) For the appointment to take place the patient must arrive on time.  4) The patient must have access to a private space for the length of the intake appointment./Mental Health Treatment

## 2024-09-27 ENCOUNTER — INPATIENT (INPATIENT)
Facility: HOSPITAL | Age: 24
LOS: 16 days | Discharge: HOME | End: 2024-10-14
Attending: PSYCHIATRY & NEUROLOGY | Admitting: PSYCHIATRY & NEUROLOGY
Payer: MEDICAID

## 2024-09-27 VITALS
SYSTOLIC BLOOD PRESSURE: 149 MMHG | TEMPERATURE: 98 F | HEART RATE: 73 BPM | DIASTOLIC BLOOD PRESSURE: 101 MMHG | OXYGEN SATURATION: 100 % | RESPIRATION RATE: 15 BRPM

## 2024-09-27 DIAGNOSIS — Z98.890 OTHER SPECIFIED POSTPROCEDURAL STATES: Chronic | ICD-10-CM

## 2024-09-27 PROCEDURE — 99285 EMERGENCY DEPT VISIT HI MDM: CPT

## 2024-09-27 NOTE — ED ADULT TRIAGE NOTE - CHIEF COMPLAINT QUOTE
C/O aggressive behavior. as per sister was breaking stuff at home. No complaints of chest pain, headache, nausea, dizziness, vomiting  SOB, fever, chills verbalized. Phx schizophrenia. C/O aggressive behavior. as per sister was breaking stuff at home. lack of sleep x2 weeks. denies SI/Hi AVH. No complaints of chest pain, headache, nausea, dizziness, vomiting  SOB, fever, chills verbalized. Phx schizophrenia.
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
## 2024-09-27 NOTE — ED ADULT TRIAGE NOTE - TEMPERATURE IN FAHRENHEIT (DEGREES F)
Danny Palomino  Orthopaedic Surgery  [address], [address]  [place] [address]  Phone: [phone]  Fax: [phone]  Follow Up Time:   
98.1

## 2024-09-27 NOTE — ED PROVIDER NOTE - RAPID ASSESSMENT
Attending MD Lepe.  Pt seen by me in waiting room 2/2 desire to determine best care.  Pt presents with escalating behavioral disturbance at home per family with dec sleep, disorganized and aggressive behavior.  On arrival for these reasons pt endorses neck pain.  I evaluated pt in triage.  Pt without phonation disturbance, tolerating secretions.  Denies sig change in throat but endorses 1 yr anterior throat pain indicating bilateral SCM's as site of greatest discomfort.  Trachea midline.  Occasional posterior neck pain.  FROM of head/neck.  No drooling or resp distress.  Shotty lymphadenopathy bilaterally.  Planned screening imaging of head neck however pt without focal neuro deficits or acute change.  Planned  eval and med clearance with expanded clearance to assess anterior neck discomfort.

## 2024-09-27 NOTE — ED PROVIDER NOTE - CLINICAL SUMMARY MEDICAL DECISION MAKING FREE TEXT BOX
24-year-old male history of depression sent in by family for neck pain and worsening depression.  Patient having angry outbursts at home.  Patient however denies SI and HI.  On physical exam patient had tenderness to the right anterior neck.  Possibly some shotty lymphadenopathy.  CT of the head and neck was ordered.

## 2024-09-28 DIAGNOSIS — F31.2 BIPOLAR DISORDER, CURRENT EPISODE MANIC SEVERE WITH PSYCHOTIC FEATURES: ICD-10-CM

## 2024-09-28 DIAGNOSIS — F29 UNSPECIFIED PSYCHOSIS NOT DUE TO A SUBSTANCE OR KNOWN PHYSIOLOGICAL CONDITION: ICD-10-CM

## 2024-09-28 LAB
ALBUMIN SERPL ELPH-MCNC: 4.8 G/DL — SIGNIFICANT CHANGE UP (ref 3.3–5)
ALP SERPL-CCNC: 90 U/L — SIGNIFICANT CHANGE UP (ref 40–120)
ALT FLD-CCNC: 27 U/L — SIGNIFICANT CHANGE UP (ref 4–41)
AMPHET UR-MCNC: NEGATIVE — SIGNIFICANT CHANGE UP
ANION GAP SERPL CALC-SCNC: 13 MMOL/L — SIGNIFICANT CHANGE UP (ref 7–14)
APAP SERPL-MCNC: <10 UG/ML — LOW (ref 15–25)
APPEARANCE UR: CLEAR — SIGNIFICANT CHANGE UP
AST SERPL-CCNC: 22 U/L — SIGNIFICANT CHANGE UP (ref 4–40)
BARBITURATES UR SCN-MCNC: NEGATIVE — SIGNIFICANT CHANGE UP
BASOPHILS # BLD AUTO: 0.03 K/UL — SIGNIFICANT CHANGE UP (ref 0–0.2)
BASOPHILS NFR BLD AUTO: 0.5 % — SIGNIFICANT CHANGE UP (ref 0–2)
BENZODIAZ UR-MCNC: NEGATIVE — SIGNIFICANT CHANGE UP
BILIRUB SERPL-MCNC: 0.6 MG/DL — SIGNIFICANT CHANGE UP (ref 0.2–1.2)
BILIRUB UR-MCNC: NEGATIVE — SIGNIFICANT CHANGE UP
BUN SERPL-MCNC: 11 MG/DL — SIGNIFICANT CHANGE UP (ref 7–23)
CALCIUM SERPL-MCNC: 10 MG/DL — SIGNIFICANT CHANGE UP (ref 8.4–10.5)
CHLORIDE SERPL-SCNC: 102 MMOL/L — SIGNIFICANT CHANGE UP (ref 98–107)
CO2 SERPL-SCNC: 26 MMOL/L — SIGNIFICANT CHANGE UP (ref 22–31)
COCAINE METAB.OTHER UR-MCNC: NEGATIVE — SIGNIFICANT CHANGE UP
COLOR SPEC: YELLOW — SIGNIFICANT CHANGE UP
CREAT SERPL-MCNC: 1.09 MG/DL — SIGNIFICANT CHANGE UP (ref 0.5–1.3)
CREATININE URINE RESULT, DAU: 105 MG/DL — SIGNIFICANT CHANGE UP
DIFF PNL FLD: NEGATIVE — SIGNIFICANT CHANGE UP
EGFR: 96 ML/MIN/1.73M2 — SIGNIFICANT CHANGE UP
EOSINOPHIL # BLD AUTO: 0.13 K/UL — SIGNIFICANT CHANGE UP (ref 0–0.5)
EOSINOPHIL NFR BLD AUTO: 2 % — SIGNIFICANT CHANGE UP (ref 0–6)
ETHANOL SERPL-MCNC: <10 MG/DL — SIGNIFICANT CHANGE UP
FENTANYL UR QL SCN: NEGATIVE — SIGNIFICANT CHANGE UP
GLUCOSE SERPL-MCNC: 95 MG/DL — SIGNIFICANT CHANGE UP (ref 70–99)
GLUCOSE UR QL: NEGATIVE MG/DL — SIGNIFICANT CHANGE UP
HCT VFR BLD CALC: 48.6 % — SIGNIFICANT CHANGE UP (ref 39–50)
HGB BLD-MCNC: 16.2 G/DL — SIGNIFICANT CHANGE UP (ref 13–17)
IANC: 3.05 K/UL — SIGNIFICANT CHANGE UP (ref 1.8–7.4)
IMM GRANULOCYTES NFR BLD AUTO: 0.2 % — SIGNIFICANT CHANGE UP (ref 0–0.9)
KETONES UR-MCNC: NEGATIVE MG/DL — SIGNIFICANT CHANGE UP
LEUKOCYTE ESTERASE UR-ACNC: NEGATIVE — SIGNIFICANT CHANGE UP
LYMPHOCYTES # BLD AUTO: 2.8 K/UL — SIGNIFICANT CHANGE UP (ref 1–3.3)
LYMPHOCYTES # BLD AUTO: 43.9 % — SIGNIFICANT CHANGE UP (ref 13–44)
MCHC RBC-ENTMCNC: 25.9 PG — LOW (ref 27–34)
MCHC RBC-ENTMCNC: 33.3 GM/DL — SIGNIFICANT CHANGE UP (ref 32–36)
MCV RBC AUTO: 77.8 FL — LOW (ref 80–100)
METHADONE UR-MCNC: NEGATIVE — SIGNIFICANT CHANGE UP
MONOCYTES # BLD AUTO: 0.36 K/UL — SIGNIFICANT CHANGE UP (ref 0–0.9)
MONOCYTES NFR BLD AUTO: 5.6 % — SIGNIFICANT CHANGE UP (ref 2–14)
NEUTROPHILS # BLD AUTO: 3.05 K/UL — SIGNIFICANT CHANGE UP (ref 1.8–7.4)
NEUTROPHILS NFR BLD AUTO: 47.8 % — SIGNIFICANT CHANGE UP (ref 43–77)
NITRITE UR-MCNC: NEGATIVE — SIGNIFICANT CHANGE UP
NRBC # BLD: 0 /100 WBCS — SIGNIFICANT CHANGE UP (ref 0–0)
NRBC # FLD: 0 K/UL — SIGNIFICANT CHANGE UP (ref 0–0)
OPIATES UR-MCNC: NEGATIVE — SIGNIFICANT CHANGE UP
OXYCODONE UR-MCNC: NEGATIVE — SIGNIFICANT CHANGE UP
PCP SPEC-MCNC: SIGNIFICANT CHANGE UP
PCP UR-MCNC: NEGATIVE — SIGNIFICANT CHANGE UP
PH UR: 6 — SIGNIFICANT CHANGE UP (ref 5–8)
PLATELET # BLD AUTO: 205 K/UL — SIGNIFICANT CHANGE UP (ref 150–400)
POTASSIUM SERPL-MCNC: 4.7 MMOL/L — SIGNIFICANT CHANGE UP (ref 3.5–5.3)
POTASSIUM SERPL-SCNC: 4.7 MMOL/L — SIGNIFICANT CHANGE UP (ref 3.5–5.3)
PROT SERPL-MCNC: 7.8 G/DL — SIGNIFICANT CHANGE UP (ref 6–8.3)
PROT UR-MCNC: NEGATIVE MG/DL — SIGNIFICANT CHANGE UP
RBC # BLD: 6.25 M/UL — HIGH (ref 4.2–5.8)
RBC # FLD: 14.2 % — SIGNIFICANT CHANGE UP (ref 10.3–14.5)
SALICYLATES SERPL-MCNC: <0.3 MG/DL — LOW (ref 15–30)
SARS-COV-2 RNA SPEC QL NAA+PROBE: SIGNIFICANT CHANGE UP
SODIUM SERPL-SCNC: 141 MMOL/L — SIGNIFICANT CHANGE UP (ref 135–145)
SP GR SPEC: 1.01 — SIGNIFICANT CHANGE UP (ref 1–1.03)
THC UR QL: NEGATIVE — SIGNIFICANT CHANGE UP
TOXICOLOGY SCREEN, DRUGS OF ABUSE, SERUM RESULT: SIGNIFICANT CHANGE UP
TSH SERPL-MCNC: 2.09 UIU/ML — SIGNIFICANT CHANGE UP (ref 0.27–4.2)
UROBILINOGEN FLD QL: 1 MG/DL — SIGNIFICANT CHANGE UP (ref 0.2–1)
WBC # BLD: 6.38 K/UL — SIGNIFICANT CHANGE UP (ref 3.8–10.5)
WBC # FLD AUTO: 6.38 K/UL — SIGNIFICANT CHANGE UP (ref 3.8–10.5)

## 2024-09-28 PROCEDURE — 70450 CT HEAD/BRAIN W/O DYE: CPT | Mod: 26,MC

## 2024-09-28 PROCEDURE — 99222 1ST HOSP IP/OBS MODERATE 55: CPT

## 2024-09-28 PROCEDURE — 70491 CT SOFT TISSUE NECK W/DYE: CPT | Mod: 26,MC

## 2024-09-28 PROCEDURE — 99285 EMERGENCY DEPT VISIT HI MDM: CPT

## 2024-09-28 PROCEDURE — 93010 ELECTROCARDIOGRAM REPORT: CPT

## 2024-09-28 RX ORDER — OLANZAPINE 2.5 MG
5 TABLET ORAL AT BEDTIME
Refills: 0 | Status: DISCONTINUED | OUTPATIENT
Start: 2024-09-28 | End: 2024-09-29

## 2024-09-28 RX ORDER — DIPHENHYDRAMINE HCL 12.5MG/5ML
50 LIQUID (ML) ORAL EVERY 4 HOURS
Refills: 0 | Status: DISCONTINUED | OUTPATIENT
Start: 2024-09-28 | End: 2024-10-14

## 2024-09-28 RX ORDER — HALOPERIDOL LACTATE 2 MG/ML
5 CONCENTRATE, ORAL ORAL ONCE
Refills: 0 | Status: DISCONTINUED | OUTPATIENT
Start: 2024-09-28 | End: 2024-10-14

## 2024-09-28 RX ORDER — DIPHENHYDRAMINE HCL 12.5MG/5ML
50 LIQUID (ML) ORAL ONCE
Refills: 0 | Status: DISCONTINUED | OUTPATIENT
Start: 2024-09-28 | End: 2024-10-14

## 2024-09-28 RX ORDER — INFLUENZA VIRUS VACCINE 15; 15; 15; 15 UG/.5ML; UG/.5ML; UG/.5ML; UG/.5ML
0.5 SUSPENSION INTRAMUSCULAR ONCE
Refills: 0 | Status: COMPLETED | OUTPATIENT
Start: 2024-09-28 | End: 2024-09-28

## 2024-09-28 RX ORDER — HALOPERIDOL LACTATE 2 MG/ML
5 CONCENTRATE, ORAL ORAL EVERY 4 HOURS
Refills: 0 | Status: DISCONTINUED | OUTPATIENT
Start: 2024-09-28 | End: 2024-10-14

## 2024-09-28 RX ADMIN — Medication 5 MILLIGRAM(S): at 20:36

## 2024-09-28 RX ADMIN — Medication 2 MILLIGRAM(S): at 09:18

## 2024-09-28 NOTE — BH INPATIENT PSYCHIATRY ASSESSMENT NOTE - NSICDXBHPRIMARYDX_PSY_ALL_CORE
Bipolar affective disorder, currently manic, severe, with psychotic features   F31.2   Schizoaffective disorder, bipolar type   F25.0

## 2024-09-28 NOTE — ED ADULT NURSE NOTE - CHIEF COMPLAINT QUOTE
C/O aggressive behavior. as per sister was breaking stuff at home. lack of sleep x2 weeks. denies SI/Hi AVH. No complaints of chest pain, headache, nausea, dizziness, vomiting  SOB, fever, chills verbalized. Phx schizophrenia.

## 2024-09-28 NOTE — BH INPATIENT PSYCHIATRY ASSESSMENT NOTE - CURRENT MEDICATION
MEDICATIONS  (STANDING):    MEDICATIONS  (PRN):  LORazepam     Tablet 2 milliGRAM(s) Oral every 4 hours PRN Agitation   MEDICATIONS  (STANDING):  influenza   Vaccine 0.5 milliLiter(s) IntraMuscular once  OLANZapine 5 milliGRAM(s) Oral at bedtime    MEDICATIONS  (PRN):  diphenhydrAMINE 50 milliGRAM(s) Oral every 4 hours PRN agitation  diphenhydrAMINE Injectable 50 milliGRAM(s) IntraMuscular once PRN severe agitation  haloperidol     Tablet 5 milliGRAM(s) Oral every 4 hours PRN agitation  haloperidol    Injectable 5 milliGRAM(s) IntraMuscular once PRN severe agitation  LORazepam     Tablet 2 milliGRAM(s) Oral every 4 hours PRN Agitation  LORazepam   Injectable 2 milliGRAM(s) IntraMuscular once PRN severe agitation

## 2024-09-28 NOTE — BH INPATIENT PSYCHIATRY ASSESSMENT NOTE - NSBHCHARTREVIEWVS_PSY_A_CORE FT
Vital Signs Last 24 Hrs  T(C): 36.6 (09-28-24 @ 07:39), Max: 36.7 (09-27-24 @ 22:04)  T(F): 97.8 (09-28-24 @ 07:39), Max: 98.1 (09-27-24 @ 22:04)  HR: 73 (09-28-24 @ 07:39) (73 - 73)  BP: 136/87 (09-28-24 @ 07:39) (136/87 - 149/101)  BP(mean): --  RR: 16 (09-28-24 @ 07:39) (15 - 16)  SpO2: 98% (09-28-24 @ 07:39) (98% - 100%)     Vital Signs Last 24 Hrs  T(C): 36.4 (09-28-24 @ 10:55), Max: 36.7 (09-27-24 @ 22:04)  T(F): 97.5 (09-28-24 @ 10:55), Max: 98.1 (09-27-24 @ 22:04)  HR: 73 (09-28-24 @ 07:39) (73 - 73)  BP: 136/87 (09-28-24 @ 07:39) (136/87 - 149/101)  BP(mean): --  RR: 16 (09-28-24 @ 07:39) (15 - 16)  SpO2: 98% (09-28-24 @ 07:39) (98% - 100%)    Orthostatic VS  09-28-24 @ 10:55  Lying BP: 124/84 HR: 71  Sitting BP: 125/94 HR: --  Standing BP: --/90 HR: --  Site: --  Mode: --
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
## 2024-09-28 NOTE — ED BEHAVIORAL HEALTH ASSESSMENT NOTE - DESCRIPTION
none known irritable but in behavioral control     Vital Signs Last 24 Hrs  T(C): 36.7 (27 Sep 2024 22:04), Max: 36.7 (27 Sep 2024 22:04)  T(F): 98.1 (27 Sep 2024 22:04), Max: 98.1 (27 Sep 2024 22:04)  HR: 73 (27 Sep 2024 22:04) (73 - 73)  BP: 149/101 (27 Sep 2024 22:04) (149/101 - 149/101)  BP(mean): --  RR: 15 (27 Sep 2024 22:04) (15 - 15)  SpO2: 100% (27 Sep 2024 22:04) (100% - 100%)    Parameters below as of 27 Sep 2024 22:04  Patient On (Oxygen Delivery Method): room air patient lives at home with his mother and father. unemployed.

## 2024-09-28 NOTE — BH PATIENT PROFILE - HOME MEDICATIONS
risperiDONE 2 mg oral tablet , 1 tab(s) orally once a day (at bedtime)   lithium 450 mg oral tablet, extended release , 2 tab(s) orally once a day (at bedtime)  Invega Sustenna 156 mg/mL intramuscular suspension, extended release , 156 milligram(s) intramuscularly every 4 weeks

## 2024-09-28 NOTE — ED BEHAVIORAL HEALTH ASSESSMENT NOTE - RISK ASSESSMENT
At this time, patient is unable to care for self at this time due to severity of psychosis and has recently engaged in property destruction at home. He has several protective factors including support from family, denial of current SI/HI, no hx of SI, no access to firearms. He requires psychiatric hospitalization for safety and stabilization.

## 2024-09-28 NOTE — ED BEHAVIORAL HEALTH ASSESSMENT NOTE - SUMMARY
The patient is a 25 yo male, lives with parents, unemployed, hx bipolar disorder with psychotic features, 2 past psych admissions ([facility] in 2021 for psychosis, [facility] 2023 for bethany w/psychosis), noncompliant with outpatient treatment, previously discharged on Invega and lithium, no known suicide attempts, no significant medical history, +history of punching walls in the context of psychiatric decompensation, hx of cannabis use, brought in by mother for poor sleep, irritability, bizarre behavior, and property destruction at home.    On exam, patient is irritable, labile, laughing inappropriately to himself, with delusional thought content and poor insight. He admits to recent sleep disturbance and irritability at home, and talks extensively about delusional thought content including believing that people are tracking him (leading him to smash his computer), somatic complaints that he feels "them" in his neck, and statements about seeing paranormal activity. He is nonadherent with medication and not currently connected to treatment. His family is concerned for his safety given his recent decompensation. Given his acute symptoms and poor insight into the need for treatment, he requires inpatient psychiatric hospitalization for safety and stabilization.     PLAN  - Admit 9.39  - routine observation, patient denies SI/HI  - PRN Haldol 5/Ativan 2/Benadryl 50  - start risperidone 1mg BID for psychosis given good previous response \  - defer discussion around re-initiation of lithium to inpatient team if indicated   - no medical issues  - family is aware of and in agreement with hospitalization The patient is a 23 yo male, lives with parents, unemployed, hx bipolar disorder with psychotic features, 2 past psych admissions ([facility] in 2021 for psychosis, [facility] 2023 for bethany w/psychosis), noncompliant with outpatient treatment, previously discharged on Invega and lithium, no known suicide attempts, no significant medical history, +history of punching walls in the context of psychiatric decompensation, hx of cannabis use, brought in by mother for poor sleep, irritability, bizarre behavior, and property destruction at home.    On exam, patient is irritable, labile, laughing inappropriately to himself, with delusional thought content and poor insight. He admits to recent sleep disturbance and irritability at home, and talks extensively about delusional thought content including believing that people are tracking him (leading him to smash his computer), somatic complaints that he feels "them" in his neck, and statements about seeing paranormal activity. He is nonadherent with medication and not currently connected to treatment. His family is concerned for his safety given his recent decompensation. Given his acute symptoms and poor insight into the need for treatment, he requires inpatient psychiatric hospitalization for safety and stabilization.     PLAN  - Admit 9.39  - routine observation, patient denies SI/HI  - PRN Haldol 5/Ativan 2/Benadryl 50  - start risperidone 1mg BID for psychosis given good previous response   - defer discussion around re-initiation of lithium to inpatient team if indicated   - no medical issues  - family is aware of and in agreement with hospitalization

## 2024-09-28 NOTE — BH INPATIENT PSYCHIATRY ASSESSMENT NOTE - VIOLENCE RISK FACTORS:
Lack of insight into violence risk/need for treatment/Noncompliance with treatment Feeling of being under threat and being unable to control threat/Lack of insight into violence risk/need for treatment/Noncompliance with treatment

## 2024-09-28 NOTE — BH INPATIENT PSYCHIATRY ASSESSMENT NOTE - MSE UNSTRUCTURED FT
On exam today the patient is anxious but generally cooperative with fair eye contact.    Speech is clear and of normal rate.    Thought process: with no evidence of a disorder of thought process.    Thought content: with paranoid delusions of being monitored and controlled   Perception: Denies hallucinations.    Mood: Describes as "anxious".  Affect: constricted.    Patient denies suicidal ideation, active intent and plan.    Patient with active aggressive ideation toward the "people doing this" but denies homicidal ideation, intent or plan.   Patient is Alert and oriented in all spheres. Patient is cognitively grossly intact.   Insight and judgment are limited.   Impulse control is intact at this time.    Vital signs are stable.   Gait and station WNL

## 2024-09-28 NOTE — ED BEHAVIORAL HEALTH ASSESSMENT NOTE - ACCOMPANIED BY
Self/Family member Non-Graft Cartilage Fenestration Text: The cartilage was fenestrated with a 2mm punch biopsy to help facilitate healing.

## 2024-09-28 NOTE — ED ADULT NURSE REASSESSMENT NOTE - NS ED NURSE REASSESS COMMENT FT1
8:30am: Received report from PM RN. Patient received medication as ordered and call made to RN for acceptance of patient for 10am. Patient left to [address] with security and personal belongings.  BLAKE Alcala

## 2024-09-28 NOTE — ED BEHAVIORAL HEALTH ASSESSMENT NOTE - NSBHMSEINTELL_PSY_A_CORE
Quality 47: Advance Care Plan: Advance Care Planning discussed and documented; advance care plan or surrogate decision maker documented in the medical record. Quality 431: Preventive Care And Screening: Unhealthy Alcohol Use - Screening: Patient not identified as an unhealthy alcohol user when screened for unhealthy alcohol use using a systematic screening method Name And Contact Information For Health Care Proxy: Angeline Hatfield wife Detail Level: Detailed Quality 110: Preventive Care And Screening: Influenza Immunization: Influenza Immunization Administered during Influenza season Quality 111:Pneumonia Vaccination Status For Older Adults: Pneumococcal vaccine administered on or after patientâs 60th birthday and before the end of the measurement period Quality 226: Preventive Care And Screening: Tobacco Use: Screening And Cessation Intervention: Patient screened for tobacco use and is an ex/non-smoker Quality 130: Documentation Of Current Medications In The Medical Record: Current Medications Documented Average

## 2024-09-28 NOTE — BH INPATIENT PSYCHIATRY ASSESSMENT NOTE - NSBHMETABOLIC_PSY_ALL_CORE_FT
BMI:   HbA1c:   Glucose:   BP: 136/87 (09-28-24 @ 07:39) (136/87 - 149/101)Vital Signs Last 24 Hrs  T(C): 36.6 (09-28-24 @ 07:39), Max: 36.7 (09-27-24 @ 22:04)  T(F): 97.8 (09-28-24 @ 07:39), Max: 98.1 (09-27-24 @ 22:04)  HR: 73 (09-28-24 @ 07:39) (73 - 73)  BP: 136/87 (09-28-24 @ 07:39) (136/87 - 149/101)  BP(mean): --  RR: 16 (09-28-24 @ 07:39) (15 - 16)  SpO2: 98% (09-28-24 @ 07:39) (98% - 100%)      Lipid Panel:  BMI:   HbA1c:   Glucose:   BP: 136/87 (09-28-24 @ 07:39) (136/87 - 149/101)Vital Signs Last 24 Hrs  T(C): 36.4 (09-28-24 @ 10:55), Max: 36.7 (09-27-24 @ 22:04)  T(F): 97.5 (09-28-24 @ 10:55), Max: 98.1 (09-27-24 @ 22:04)  HR: 73 (09-28-24 @ 07:39) (73 - 73)  BP: 136/87 (09-28-24 @ 07:39) (136/87 - 149/101)  BP(mean): --  RR: 16 (09-28-24 @ 07:39) (15 - 16)  SpO2: 98% (09-28-24 @ 07:39) (98% - 100%)    Orthostatic VS  09-28-24 @ 10:55  Lying BP: 124/84 HR: 71  Sitting BP: 125/94 HR: --  Standing BP: --/90 HR: --  Site: --  Mode: --    Lipid Panel:
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
## 2024-09-28 NOTE — BH INPATIENT PSYCHIATRY ASSESSMENT NOTE - NSSUICPROTFACT_PSY_ALL_CORE
Supportive social network of family or friends Responsibility to children, family, or others/Supportive social network of family or friends/Fear of death or the actual act of killing self/Cultural, spiritual and/or moral attitudes against suicide

## 2024-09-28 NOTE — BH INPATIENT PSYCHIATRY ASSESSMENT NOTE - OTHER PAST PSYCHIATRIC HISTORY (INCLUDE DETAILS REGARDING ONSET, COURSE OF ILLNESS, INPATIENT/OUTPATIENT TREATMENT)
Endocrinology/Event Note Event Note/Negative path Event Note/Preop Note Nutrition Services post-operative note (Admit Diagnosis) Chronic obstructive pulmonary disease with acute exacerbation  (PMH) Chronic GERD  (PMH) COPD with asthma  (PMH) Post traumatic stress disorder (PTSD) see HPI

## 2024-09-28 NOTE — ED BEHAVIORAL HEALTH ASSESSMENT NOTE - PAST PSYCHOTROPIC MEDICATION
discharged March 2023 on:  Invega Sustenna 156 mg/mL intramuscular suspension, extended release: 156 milligram(s) intramuscularly every 4 weeks   lithium 450 mg oral tablet, extended release: 2 tab(s) orally once a day (at bedtime)  risperiDONE 2 mg oral tablet: 1 tab(s) orally once a day (at bedtime)

## 2024-09-28 NOTE — BH INPATIENT PSYCHIATRY ASSESSMENT NOTE - NSBHATTESTBILLING_PSY_A_CORE
To get better and follow your care plan as instructed. 99222-Initial OBS or IP - moderate complexity OR 55-74 mins
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
## 2024-09-29 LAB
A1C WITH ESTIMATED AVERAGE GLUCOSE RESULT: 5.4 % — SIGNIFICANT CHANGE UP (ref 4–5.6)
CHOLEST SERPL-MCNC: 220 MG/DL — HIGH
ESTIMATED AVERAGE GLUCOSE: 108 — SIGNIFICANT CHANGE UP
HDLC SERPL-MCNC: 36 MG/DL — LOW
LIPID PNL WITH DIRECT LDL SERPL: 135 MG/DL — HIGH
NON HDL CHOLESTEROL: 184 MG/DL — HIGH
TRIGL SERPL-MCNC: 247 MG/DL — HIGH

## 2024-09-29 PROCEDURE — 99232 SBSQ HOSP IP/OBS MODERATE 35: CPT

## 2024-09-29 RX ORDER — OLANZAPINE 2.5 MG
10 TABLET ORAL AT BEDTIME
Refills: 0 | Status: DISCONTINUED | OUTPATIENT
Start: 2024-09-29 | End: 2024-10-04

## 2024-09-29 RX ADMIN — Medication 10 MILLIGRAM(S): at 20:50

## 2024-09-29 RX ADMIN — Medication 4 MILLIGRAM(S): at 20:50

## 2024-09-29 NOTE — PSYCHIATRIC REHAB INITIAL EVALUATION - PRIMARY ROLES/RESPONSIBILITIES
Quality 111:Pneumonia Vaccination Status For Older Adults: Pneumococcal Vaccination Previously Received Detail Level: Detailed Quality 130: Documentation Of Current Medications In The Medical Record: Current Medications Documented Quality 131: Pain Assessment And Follow-Up: Pain assessment using a standardized tool is documented as negative, no follow-up plan required Quality 110: Preventive Care And Screening: Influenza Immunization: Influenza Immunization Administered during Influenza season Quality 402: Tobacco Use And Help With Quitting Among Adolescents: Patient screened for tobacco and never smoked none

## 2024-09-29 NOTE — PSYCHIATRIC REHAB INITIAL EVALUATION - NSBHALCSUBTREAT_PSY_ALL_CORE
As per chart, pt has psychiatric history of bipolar disorder with psychotic features, 2 past psych admissions ([facility] in 2021 for psychosis, [facility] 2023 for bethany w/psychosis), noncompliant with outpatient treatment./None

## 2024-09-29 NOTE — PSYCHIATRIC REHAB INITIAL EVALUATION - NSBHPRRECOMMEND_PSY_ALL_CORE
Writer met with patient to orient to unit and introduce self, psychiatric rehabilitation staff and department functions. Patient was provided a copy of the unit schedule. On interview, patient was calm and cooperative upon approach. Patient was dressed in hospital gown and was disheveled. Patient demonstrated limited insight into his symptoms and needs of treatment at this time. Writer encouraged patient to attend psychiatric rehabilitation groups and engage in treatment. Writer and patient were able to establish a collaborative psychiatric rehabilitation goal. Psychiatric Rehabilitation staff will continue to engage patient daily in order to develop therapeutic rapport.

## 2024-09-30 PROCEDURE — 99232 SBSQ HOSP IP/OBS MODERATE 35: CPT

## 2024-09-30 RX ADMIN — Medication 10 MILLIGRAM(S): at 20:32

## 2024-09-30 RX ADMIN — Medication 4 MILLIGRAM(S): at 21:19

## 2024-09-30 RX ADMIN — Medication 2 MILLIGRAM(S): at 20:33

## 2024-09-30 NOTE — ED BEHAVIORAL HEALTH NOTE - BEHAVIORAL HEALTH NOTE
INSURANCE AUTH: Writer called ramona  spoke to Lupillo OSPINA 12:43pm on 9/30/24 states kristin needs to call.   Writer called back spoke to Paola auth# [id number]  to be assigned.
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
## 2024-10-01 PROCEDURE — 99232 SBSQ HOSP IP/OBS MODERATE 35: CPT

## 2024-10-01 RX ADMIN — Medication 4 MILLIGRAM(S): at 20:47

## 2024-10-01 RX ADMIN — Medication 10 MILLIGRAM(S): at 20:47

## 2024-10-01 NOTE — BH INPATIENT PSYCHIATRY PROGRESS NOTE - NSTXPSYCHOGOALOTHER_PSY_ALL_CORE
Patient will be less psychotic and stable for discharge with a CGI Improvement Score of 2 

## 2024-10-02 PROCEDURE — 99232 SBSQ HOSP IP/OBS MODERATE 35: CPT

## 2024-10-02 RX ADMIN — Medication 10 MILLIGRAM(S): at 20:51

## 2024-10-02 RX ADMIN — Medication 2 MILLIGRAM(S): at 20:51

## 2024-10-02 RX ADMIN — Medication 4 MILLIGRAM(S): at 16:45

## 2024-10-03 PROCEDURE — 99231 SBSQ HOSP IP/OBS SF/LOW 25: CPT

## 2024-10-03 RX ADMIN — Medication 10 MILLIGRAM(S): at 20:17

## 2024-10-04 PROCEDURE — 99232 SBSQ HOSP IP/OBS MODERATE 35: CPT

## 2024-10-04 RX ORDER — OLANZAPINE 2.5 MG
15 TABLET ORAL AT BEDTIME
Refills: 0 | Status: DISCONTINUED | OUTPATIENT
Start: 2024-10-04 | End: 2024-10-07

## 2024-10-04 RX ADMIN — Medication 15 MILLIGRAM(S): at 20:18

## 2024-10-05 RX ORDER — ACETAMINOPHEN 325 MG
650 TABLET ORAL EVERY 6 HOURS
Refills: 0 | Status: DISCONTINUED | OUTPATIENT
Start: 2024-10-05 | End: 2024-10-14

## 2024-10-05 RX ADMIN — Medication 15 MILLIGRAM(S): at 19:55

## 2024-10-05 RX ADMIN — Medication 650 MILLIGRAM(S): at 11:09

## 2024-10-05 RX ADMIN — Medication 650 MILLIGRAM(S): at 19:51

## 2024-10-05 RX ADMIN — Medication 650 MILLIGRAM(S): at 21:00

## 2024-10-06 RX ADMIN — Medication 4 MILLIGRAM(S): at 19:58

## 2024-10-06 RX ADMIN — Medication 15 MILLIGRAM(S): at 20:14

## 2024-10-07 PROCEDURE — 99232 SBSQ HOSP IP/OBS MODERATE 35: CPT

## 2024-10-07 RX ORDER — OLANZAPINE 2.5 MG
20 TABLET ORAL AT BEDTIME
Refills: 0 | Status: DISCONTINUED | OUTPATIENT
Start: 2024-10-07 | End: 2024-10-14

## 2024-10-07 RX ADMIN — Medication 4 MILLIGRAM(S): at 10:52

## 2024-10-07 RX ADMIN — Medication 20 MILLIGRAM(S): at 20:32

## 2024-10-07 RX ADMIN — Medication 4 MILLIGRAM(S): at 19:56

## 2024-10-08 PROCEDURE — 99232 SBSQ HOSP IP/OBS MODERATE 35: CPT

## 2024-10-08 RX ADMIN — Medication 4 MILLIGRAM(S): at 20:52

## 2024-10-08 RX ADMIN — Medication 4 MILLIGRAM(S): at 14:50

## 2024-10-08 RX ADMIN — Medication 20 MILLIGRAM(S): at 20:52

## 2024-10-08 RX ADMIN — Medication 4 MILLIGRAM(S): at 17:19

## 2024-10-08 NOTE — BH TREATMENT PLAN - NSTXDCOPNOINTERSW_PSY_ALL_CORE
SW reviewed EMR, met with pt, met with team , gained consnet to speak with parents and make referral for provider.
SW met with , met with team, communicated with pts motehr, made referral to [facility].

## 2024-10-08 NOTE — BH TREATMENT PLAN - NSTXVIOLNTINTERRN_PSY_ALL_CORE
Encourage the patient to verbalize feelings of anger without breaking things.
Encourage the patient to verbalize feelings of anger without breaking things.

## 2024-10-08 NOTE — BH TREATMENT PLAN - NSTXVIOLNTGOAL_PSY_ALL_CORE
Will communicate an angry feeling in a controlled manner
Will decrease the number/duration/intensity of angry/aggressive outbursts

## 2024-10-08 NOTE — BH TREATMENT PLAN - NSTXMEDICINTERPR_PSY_ALL_CORE
Psychiatric rehabilitation staff will provide encouragement, support, and psychoeducation to assist the patient in the progression of his treatment goal.
Writer met pt to review progress into his psych rehab goal. Pt was in his bed but receptive to writer’s engagement. Pt has made minimal progress into his psych rehab goal of identifying benefits of medication compliance, as pt is unable to identify any benefits despite assistance at this time. Within the group setting, pt has been intermittently visible and socializing with select peers. Pt attended approximately 25% of psych rehab groups over the past week. Pt prefers leisure-based groups. Pt is able to appropriately participate in group activities. Psych rehab staff will continue to support patient via 1:1 engagement and encourage programming attendance in effort to facilitate continued progress towards goal.

## 2024-10-08 NOTE — BH TREATMENT PLAN - NSTXMEDICINTERRN_PSY_ALL_CORE
continue med teaching and the importance of med compliances.
Encourage the patient to take daily meds. Educate the patient about the benefits of medications.

## 2024-10-09 PROCEDURE — 99232 SBSQ HOSP IP/OBS MODERATE 35: CPT

## 2024-10-09 RX ADMIN — Medication 20 MILLIGRAM(S): at 20:01

## 2024-10-09 RX ADMIN — Medication 4 MILLIGRAM(S): at 20:01

## 2024-10-10 PROCEDURE — 99232 SBSQ HOSP IP/OBS MODERATE 35: CPT

## 2024-10-10 RX ADMIN — Medication 4 MILLIGRAM(S): at 08:28

## 2024-10-10 RX ADMIN — Medication 20 MILLIGRAM(S): at 20:01

## 2024-10-10 RX ADMIN — Medication 4 MILLIGRAM(S): at 20:02

## 2024-10-10 RX ADMIN — Medication 4 MILLIGRAM(S): at 15:03

## 2024-10-10 NOTE — BH DISCHARGE NOTE NURSING/SOCIAL WORK/PSYCH REHAB - PATIENT PORTAL LINK FT
You can access the FollowMyHealth Patient Portal offered by [facility] by registering at the following website: [email or web address]. By joining [employer]’s FollowMyHealth portal, you will also be able to view your health information using other applications (apps) compatible with our system.

## 2024-10-10 NOTE — BH PSYCHOLOGY - GROUP THERAPY NOTE - NSBHPSYCHOLPARTICIPCOMMENT_PSY_A_CORE FT
Pt. engaged appropriately in group. Pt. was alert and oriented throughout group. During check-in, pt. shared "video games" helps him feel most absorbed. Pt. participated in the Acceptance Box activity and discussion. Pt. shared his reflections and listened as others shared their experiences.

## 2024-10-10 NOTE — BH DISCHARGE NOTE NURSING/SOCIAL WORK/PSYCH REHAB - NSDCPRGOAL_PSY_ALL_CORE
Pt met specified psych rehab goal of identifying benefits of medication compliance, as pt is able to identify better sleep and stable mood as benefit of compliance. Pt reported overall improvement and is future oriented.  Pt completed a safety plan prior discharge.

## 2024-10-10 NOTE — BH DISCHARGE NOTE NURSING/SOCIAL WORK/PSYCH REHAB - NSBHDCADDR1FT_A_CORE
[id number] , [facility]. Wilfredo perez at [facility]

## 2024-10-10 NOTE — BH DISCHARGE NOTE NURSING/SOCIAL WORK/PSYCH REHAB - DISCHARGE INSTRUCTIONS AFTERCARE APPOINTMENTS
In order to check the location, date, or time of your aftercare appointment, please refer to your Discharge Instructions Document given to you upon leaving the hospital.  If you have lost the instructions please call [phone]

## 2024-10-10 NOTE — BH PSYCHOLOGY - GROUP THERAPY NOTE - TOKEN PULL-DIAGNOSIS
Primary Diagnosis:  Schizoaffective disorder, bipolar type [F25.0]      Bipolar affective disorder, currently manic, severe, with psychotic features [F31.2]        Problem Dx:   Bipolar affective disorder, currently manic, severe, with psychotic features [F31.2]

## 2024-10-10 NOTE — BH PSYCHOLOGY - GROUP THERAPY NOTE - NSPSYCHOLGRPCOGPT_PSY_A_CORE FT
Patient attended recovery oriented/acceptance & commitment therapy group. The group started with the brief check in question: "What is one activity that helps you feel present or absorbed?". The group then focused on topics of acceptance, cognitive defusion, and compassion. Patients engaged in an activity of making an origmai box. Patients were then asked to place experiences, emotions, or thoughts they had to accept into the box. A discussion about how to handle and relate to the content of the box followed. Patients shared examples of experiences they were working to accept, as well as their view that the box could "never be empty" and that "you have to accept and face it".  facilitated discussion of concepts, encouraged active participation, and supported members providing feedback to peers.

## 2024-10-10 NOTE — BH PSYCHOLOGY - GROUP THERAPY NOTE - NSBHPSYCHOLASSESSPROV_PSY_A_CORE
Assessment & Plan   CAD s/p CABG x 3 POD#1  H/o HTN, DM  BPH    Plan:  -Wean oxygen as tolerated  -Insulin drip x 24 hrs  - IS, pulm toilet  -keep swan  -Albumin bolus given today  -CT as per sx  -ICU prophylaxis         Deni Brooks MD  Pulmonary and Critical Care Medicine  [facility]  Intensivist Phone: [phone]    _____________________________________________  Subjective   Extubated overnight. On NC this am in chair. Off pressors. On insulin drip. Had brief syncopal episode when standing up.     Objective     I/O's    Intake/Output Summary (Last 24 hours) at 5/22/2020 1014  Last data filed at 5/22/2020 1000  Gross per 24 hour   Intake 6934.82 ml   Output 1576 ml   Net 5358.82 ml       Last Recorded Vitals  Blood pressure 106/63, pulse 80, temperature 97.9 °F (36.6 °C), resp. rate 18, height 5' 10\" (1.778 m), weight 114.3 kg (251 lb 15.8 oz), SpO2 97 %.  Body mass index is 36.16 kg/m².    Physical Exam  NAD  EOMI, AVA  Decreased BS in bases  [id number]  NT, ND,+bs       Ventilator Settings:  FiO2 (%):  [40 %-100 %] 40 %  S RR:  [12] 12  S VT:  [600 mL] 600 mL  PEEP/CPAP/EPAP:  [5 cm H20] 5 cm H20  MS SUP:  [10 cm H20] 10 cm H20  Labs   Recent Labs   Lab 05/22/20  0408 05/21/20  1313 05/21/20  0415   SODIUM 144 139 139   POTASSIUM 3.9 4.5 3.7   CHLORIDE 114* 110* 107   CO2 24 26 28   BUN 13 13 13   CREATININE 1.19* 1.11 0.97   GLUCOSE 128* 179* 135*   CALCIUM 7.4* 8.8 8.0*      Recent Labs   Lab 05/22/20  0408 05/21/20  1313 05/21/20  0415 05/18/20  0949   SODIUM 144 139 139 137   CO2 24 26 28 25   BUN 13 13 13 14   CREATININE 1.19* 1.11 0.97 1.08   CALCIUM 7.4* 8.8 8.0* 8.9   ALBUMIN  --   --  3.0* 3.6   BILIRUBIN  --   --  0.5 0.4   ALKPT  --   --  74 88   GPT  --   --  23 32   AST  --   --  15 20   GLUCOSE 128* 179* 135* 155*      Recent Labs   Lab 05/22/20  0408 05/21/20  1313 05/21/20  0415   WBC 10.6 18.5* 5.8   RBC 3.25* 3.10* 4.18*   HGB 7.3* 7.1* 9.3*   HCT 24.4* 23.0* 31.3*   PLT 92* 147  135*          Xr Chest Pa Or Ap 1 View    Result Date: 5/22/2020  EXAM: PORTABLE CXR TIME: 0607 HRS CLINICAL INDICATION: Status post open-heart surgery.  Follow-up. COMPARISON: Chest x-ray 05/21/2020 FINDINGS: Cardiomegaly without change.  Slightly better lung expansion.  Decreased bilateral interstitial infiltrates.  Mild central pulmonary venous congestion.  Persistent linear atelectasis at lung bases.  No pneumothorax is seen.  There has been interval removal of the endotracheal tube.  Stable position of the chest tubes and [place]-Mehrdad catheter.     1. Interval decrease bilateral interstitial infiltrates/edema. 2. Persistent bibasilar linear atelectasis. 3.  Removal of the endotracheal tube.  No pneumothorax seen. Electronically Signed by: JUDY BLUE JR, M.D. Signed on: 5/22/2020 8:16 AM     Xr Tube Check Chest    Result Date: 5/21/2020  EXAM: XR TUBE CHECK CHEST CLINICAL INDICATION: Post CABG, instrument count. COMPARISON: CT chest exam 05/20/2020 FINDINGS: Portable supine frontal view of the chest was obtained.  There is mild enlargement of cardiomediastinal silhouette.  Post CABG change is noted.  Endotracheal tube is present with tip 4.7 cm above the alexis.  Mediastinal and left-sided chest tubes are noted.  Left-sided [place]-Mehrdad catheter is present with distal tip in proximal right main pulmonary artery.  Supine imaging limits assessment of pulmonary vasculature, but there are coarse and perihilar interstitial opacities suggesting mild pulmonary vascular congestion.  Some additional superimposed opacities in lower lung zones may represent postoperative atelectasis, but can be reassessed in follow-up imaging. Surgical clips project at the upper abdomen. The operating room, Betsy, was notified these findings by telephone on 05/21/2020 at 2:16 PM.     1.  Postoperative changes and supporting devices as described. 2.  No radiopaque foreign body/surgical instrument is seen within the visualized field. 3.  Mild  pulmonary vascular congestion suspected, but with imaging limited by supine positioning.  Superimposed lower lung zone opacities may represent postoperative atelectasis. Electronically Signed by: JATIN SOSA M.D. Signed on: 5/21/2020 2:18 PM       Imaging  Xr Chest Pa Or Ap 1 View    Result Date: 5/22/2020  EXAM: PORTABLE CXR TIME: 0607 HRS CLINICAL INDICATION: Status post open-heart surgery.  Follow-up. COMPARISON: Chest x-ray 05/21/2020 FINDINGS: Cardiomegaly without change.  Slightly better lung expansion.  Decreased bilateral interstitial infiltrates.  Mild central pulmonary venous congestion.  Persistent linear atelectasis at lung bases.  No pneumothorax is seen.  There has been interval removal of the endotracheal tube.  Stable position of the chest tubes and [place]-Mehrdad catheter.     1. Interval decrease bilateral interstitial infiltrates/edema. 2. Persistent bibasilar linear atelectasis. 3.  Removal of the endotracheal tube.  No pneumothorax seen. Electronically Signed by: JUDY BLUE JR, M.D. Signed on: 5/22/2020 8:16 AM     Xr Tube Check Chest    Result Date: 5/21/2020  EXAM: XR TUBE CHECK CHEST CLINICAL INDICATION: Post CABG, instrument count. COMPARISON: CT chest exam 05/20/2020 FINDINGS: Portable supine frontal view of the chest was obtained.  There is mild enlargement of cardiomediastinal silhouette.  Post CABG change is noted.  Endotracheal tube is present with tip 4.7 cm above the alexis.  Mediastinal and left-sided chest tubes are noted.  Left-sided [place]-Mehrdad catheter is present with distal tip in proximal right main pulmonary artery.  Supine imaging limits assessment of pulmonary vasculature, but there are coarse and perihilar interstitial opacities suggesting mild pulmonary vascular congestion.  Some additional superimposed opacities in lower lung zones may represent postoperative atelectasis, but can be reassessed in follow-up imaging. Surgical clips project at the upper abdomen. The operating  Betsy nguyen, was notified these findings by telephone on 05/21/2020 at 2:16 PM.     1.  Postoperative changes and supporting devices as described. 2.  No radiopaque foreign body/surgical instrument is seen within the visualized field. 3.  Mild pulmonary vascular congestion suspected, but with imaging limited by supine positioning.  Superimposed lower lung zone opacities may represent postoperative atelectasis. Electronically Signed by: JATIN SOSA M.D. Signed on: 5/21/2020 2:18 PM                    Licensed Psychologist and Psychology Trainee

## 2024-10-10 NOTE — BH DISCHARGE NOTE NURSING/SOCIAL WORK/PSYCH REHAB - NSCDUDCCRISIS_PSY_A_CORE
[facility]  1 [phone]) [facility]-WELL ([phone])  Text "WELL" to 65413  Website: [email or web address]/.National Suicide Prevention Lifeline 1 (841) 752-3142/.  [employer]  1 (717) LIFENET (851-1719)/.  [facility]  [address] [address] [phone]   Hours:  Monday through Friday from 9 AM to 3 PM/988 Suicide and Crisis Lifeline

## 2024-10-11 PROCEDURE — 99232 SBSQ HOSP IP/OBS MODERATE 35: CPT

## 2024-10-11 RX ORDER — OLANZAPINE 2.5 MG
1 TABLET ORAL
Qty: 30 | Refills: 0
Start: 2024-10-11 | End: 2024-11-09

## 2024-10-11 RX ADMIN — Medication 20 MILLIGRAM(S): at 20:45

## 2024-10-11 RX ADMIN — Medication 4 MILLIGRAM(S): at 09:52

## 2024-10-11 RX ADMIN — Medication 4 MILLIGRAM(S): at 13:07

## 2024-10-11 NOTE — BH INPATIENT PSYCHIATRY DISCHARGE NOTE - NSDCMRMEDTOKEN_GEN_ALL_CORE_FT
Nicorette 4 mg oral transmucosal gum: 1 gum by transmucosal administration 3 times a day as needed for craving  OLANZapine 20 mg oral tablet: 1 tab(s) orally once a day (at bedtime)

## 2024-10-11 NOTE — BH INPATIENT PSYCHIATRY PROGRESS NOTE - NSBHCONSULTIPREASON_PSY_A_CORE
Emotional support and pre op teaching provided to patient and wife with verbalized understanding.
danger to others; mental illness expected to respond to inpatient care

## 2024-10-11 NOTE — BH INPATIENT PSYCHIATRY PROGRESS NOTE - NSTXDCOPNODATETRGT_PSY_ALL_CORE
16-Oct-2024
07-Oct-2024
16-Oct-2024
07-Oct-2024
16-Oct-2024
07-Oct-2024

## 2024-10-11 NOTE — BH INPATIENT PSYCHIATRY DISCHARGE NOTE - NSDCCPCAREPLAN_GEN_ALL_CORE_FT
PRINCIPAL DISCHARGE DIAGNOSIS  Diagnosis: Chronic schizophrenia with acute exacerbation  Assessment and Plan of Treatment:      PRINCIPAL DISCHARGE DIAGNOSIS  Diagnosis: Chronic schizophrenia with acute exacerbation  Assessment and Plan of Treatment:       SECONDARY DISCHARGE DIAGNOSES  Diagnosis: Nicotine dependence  Assessment and Plan of Treatment:

## 2024-10-11 NOTE — BH INPATIENT PSYCHIATRY PROGRESS NOTE - NSBHMETABOLIC_PSY_ALL_CORE_FT
BMI:   HbA1c: A1C with Estimated Average Glucose Result: 5.4 % (09-29-24 @ 08:44)    Glucose:   BP: 136/87 (09-28-24 @ 07:39) (136/87 - 149/101)Vital Signs Last 24 Hrs  T(C): 36.6 (09-29-24 @ 08:32), Max: 36.6 (09-29-24 @ 08:32)  T(F): 97.8 (09-29-24 @ 08:32), Max: 97.8 (09-29-24 @ 08:32)  HR: --  BP: --  BP(mean): --  RR: --  SpO2: --    Orthostatic VS  09-29-24 @ 08:32  Lying BP: --/-- HR: --  Sitting BP: 112/78 HR: 69  Standing BP: 125/83 HR: 98  Site: --  Mode: --  Orthostatic VS  09-28-24 @ 10:55  Lying BP: 124/84 HR: 71  Sitting BP: 125/94 HR: --  Standing BP: --/90 HR: --  Site: --  Mode: --    Lipid Panel: Date/Time: 09-29-24 @ 08:44  Cholesterol, Serum: 220  LDL Cholesterol Calculated: 135  HDL Cholesterol, Serum: 36  Total Cholesterol/HDL Ration Measurement: --  Triglycerides, Serum: 247  
BMI:   HbA1c: A1C with Estimated Average Glucose Result: 5.4 % (09-29-24 @ 08:44)    Glucose:   BP: 136/87 (09-28-24 @ 07:39) (136/87 - 149/101)Vital Signs Last 24 Hrs  T(C): 36.6 (09-30-24 @ 07:40), Max: 36.6 (09-30-24 @ 07:40)  T(F): 97.9 (09-30-24 @ 07:40), Max: 97.9 (09-30-24 @ 07:40)  HR: --  BP: --  BP(mean): --  RR: --  SpO2: --    Orthostatic VS  09-30-24 @ 07:40  Lying BP: --/-- HR: --  Sitting BP: 121/78 HR: 74  Standing BP: 114/71 HR: 99  Site: --  Mode: --  Orthostatic VS  09-29-24 @ 08:32  Lying BP: --/-- HR: --  Sitting BP: 112/78 HR: 69  Standing BP: 125/83 HR: 98  Site: --  Mode: --    Lipid Panel: Date/Time: 09-29-24 @ 08:44  Cholesterol, Serum: 220  LDL Cholesterol Calculated: 135  HDL Cholesterol, Serum: 36  Total Cholesterol/HDL Ration Measurement: --  Triglycerides, Serum: 247  
BMI:   HbA1c: A1C with Estimated Average Glucose Result: 5.4 % (09-29-24 @ 08:44)    Glucose:   BP: --Vital Signs Last 24 Hrs  T(C): --  T(F): --  HR: --  BP: --  BP(mean): --  RR: --  SpO2: --    Orthostatic VS  09-30-24 @ 07:40  Lying BP: --/-- HR: --  Sitting BP: 121/78 HR: 74  Standing BP: 114/71 HR: 99  Site: --  Mode: --    Lipid Panel: Date/Time: 09-29-24 @ 08:44  Cholesterol, Serum: 220  LDL Cholesterol Calculated: 135  HDL Cholesterol, Serum: 36  Total Cholesterol/HDL Ration Measurement: --  Triglycerides, Serum: 247  
BMI:   HbA1c: A1C with Estimated Average Glucose Result: 5.4 % (09-29-24 @ 08:44)    Glucose:   BP: --Vital Signs Last 24 Hrs  T(C): 36.3 (10-03-24 @ 06:52), Max: 36.3 (10-03-24 @ 06:52)  T(F): 97.4 (10-03-24 @ 06:52), Max: 97.4 (10-03-24 @ 06:52)  HR: --  BP: --  BP(mean): --  RR: --  SpO2: --    Orthostatic VS  10-03-24 @ 06:52  Lying BP: --/-- HR: --  Sitting BP: 122/77 HR: 75  Standing BP: 115/84 HR: 89  Site: upper right arm  Mode: electronic  Orthostatic VS  10-02-24 @ 08:07  Lying BP: --/-- HR: --  Sitting BP: 128/79 HR: 86  Standing BP: 134/86 HR: 97  Site: --  Mode: --    Lipid Panel: Date/Time: 09-29-24 @ 08:44  Cholesterol, Serum: 220  LDL Cholesterol Calculated: 135  HDL Cholesterol, Serum: 36  Total Cholesterol/HDL Ration Measurement: --  Triglycerides, Serum: 247  
BMI:   HbA1c: A1C with Estimated Average Glucose Result: 5.4 % (09-29-24 @ 08:44)    Glucose:   BP: --Vital Signs Last 24 Hrs  T(C): 36.4 (10-11-24 @ 06:31), Max: 36.4 (10-11-24 @ 06:31)  T(F): 97.5 (10-11-24 @ 06:31), Max: 97.5 (10-11-24 @ 06:31)  HR: --  BP: --  BP(mean): --  RR: --  SpO2: --    Orthostatic VS  10-11-24 @ 06:31  Lying BP: --/-- HR: --  Sitting BP: 112/71 HR: 99  Standing BP: 114/72 HR: 109  Site: --  Mode: --  Orthostatic VS  10-10-24 @ 08:17  Lying BP: --/-- HR: --  Sitting BP: 124/87 HR: 86  Standing BP: 136/88 HR: 94  Site: --  Mode: --  Orthostatic VS  10-09-24 @ 08:29  Lying BP: --/-- HR: --  Sitting BP: 133/80 HR: 81  Standing BP: 131/81 HR: 90  Site: --  Mode: --    Lipid Panel: Date/Time: 09-29-24 @ 08:44  Cholesterol, Serum: 220  LDL Cholesterol Calculated: 135  HDL Cholesterol, Serum: 36  Total Cholesterol/HDL Ration Measurement: --  Triglycerides, Serum: 247  
BMI:   HbA1c: A1C with Estimated Average Glucose Result: 5.4 % (09-29-24 @ 08:44)    Glucose:   BP: --Vital Signs Last 24 Hrs  T(C): 36 (10-08-24 @ 08:18), Max: 36 (10-08-24 @ 08:18)  T(F): 96.8 (10-08-24 @ 08:18), Max: 96.8 (10-08-24 @ 08:18)  HR: --  BP: --  BP(mean): --  RR: --  SpO2: --    Orthostatic VS  10-08-24 @ 08:18  Lying BP: --/-- HR: --  Sitting BP: 132/96 HR: 86  Standing BP: 122/82 HR: 88  Site: --  Mode: --    Lipid Panel: Date/Time: 09-29-24 @ 08:44  Cholesterol, Serum: 220  LDL Cholesterol Calculated: 135  HDL Cholesterol, Serum: 36  Total Cholesterol/HDL Ration Measurement: --  Triglycerides, Serum: 247  
BMI:   HbA1c: A1C with Estimated Average Glucose Result: 5.4 % (09-29-24 @ 08:44)    Glucose:   BP: --Vital Signs Last 24 Hrs  T(C): 36.8 (10-02-24 @ 08:07), Max: 36.8 (10-02-24 @ 08:07)  T(F): 98.3 (10-02-24 @ 08:07), Max: 98.3 (10-02-24 @ 08:07)  HR: --  BP: --  BP(mean): --  RR: --  SpO2: --    Orthostatic VS  10-02-24 @ 08:07  Lying BP: --/-- HR: --  Sitting BP: 128/79 HR: 86  Standing BP: 134/86 HR: 97  Site: --  Mode: --    Lipid Panel: Date/Time: 09-29-24 @ 08:44  Cholesterol, Serum: 220  LDL Cholesterol Calculated: 135  HDL Cholesterol, Serum: 36  Total Cholesterol/HDL Ration Measurement: --  Triglycerides, Serum: 247  
BMI:   HbA1c: A1C with Estimated Average Glucose Result: 5.4 % (09-29-24 @ 08:44)    Glucose:   BP: --Vital Signs Last 24 Hrs  T(C): 36.1 (10-09-24 @ 08:29), Max: 36.1 (10-09-24 @ 08:29)  T(F): 96.9 (10-09-24 @ 08:29), Max: 96.9 (10-09-24 @ 08:29)  HR: --  BP: --  BP(mean): --  RR: --  SpO2: --    Orthostatic VS  10-09-24 @ 08:29  Lying BP: --/-- HR: --  Sitting BP: 133/80 HR: 81  Standing BP: 131/81 HR: 90  Site: --  Mode: --  Orthostatic VS  10-08-24 @ 08:18  Lying BP: --/-- HR: --  Sitting BP: 132/96 HR: 86  Standing BP: 122/82 HR: 88  Site: --  Mode: --    Lipid Panel: Date/Time: 09-29-24 @ 08:44  Cholesterol, Serum: 220  LDL Cholesterol Calculated: 135  HDL Cholesterol, Serum: 36  Total Cholesterol/HDL Ration Measurement: --  Triglycerides, Serum: 247  
BMI:   HbA1c: A1C with Estimated Average Glucose Result: 5.4 % (09-29-24 @ 08:44)    Glucose:   BP: --Vital Signs Last 24 Hrs  T(C): 36.2 (10-10-24 @ 08:17), Max: 36.2 (10-10-24 @ 08:17)  T(F): 97.2 (10-10-24 @ 08:17), Max: 97.2 (10-10-24 @ 08:17)  HR: --  BP: --  BP(mean): --  RR: --  SpO2: --    Orthostatic VS  10-10-24 @ 08:17  Lying BP: --/-- HR: --  Sitting BP: 124/87 HR: 86  Standing BP: 136/88 HR: 94  Site: --  Mode: --  Orthostatic VS  10-09-24 @ 08:29  Lying BP: --/-- HR: --  Sitting BP: 133/80 HR: 81  Standing BP: 131/81 HR: 90  Site: --  Mode: --    Lipid Panel: Date/Time: 09-29-24 @ 08:44  Cholesterol, Serum: 220  LDL Cholesterol Calculated: 135  HDL Cholesterol, Serum: 36  Total Cholesterol/HDL Ration Measurement: --  Triglycerides, Serum: 247  
BMI:   HbA1c: A1C with Estimated Average Glucose Result: 5.4 % (09-29-24 @ 08:44)    Glucose:   BP: --Vital Signs Last 24 Hrs  T(C): --  T(F): --  HR: --  BP: --  BP(mean): --  RR: --  SpO2: --    Orthostatic VS  10-06-24 @ 08:14  Lying BP: --/-- HR: --  Sitting BP: 111/68 HR: 77  Standing BP: 105/63 HR: 96  Site: --  Mode: --    Lipid Panel: Date/Time: 09-29-24 @ 08:44  Cholesterol, Serum: 220  LDL Cholesterol Calculated: 135  HDL Cholesterol, Serum: 36  Total Cholesterol/HDL Ration Measurement: --  Triglycerides, Serum: 247  
BMI:   HbA1c: A1C with Estimated Average Glucose Result: 5.4 % (09-29-24 @ 08:44)    Glucose:   BP: --Vital Signs Last 24 Hrs  T(C): 35.7 (10-04-24 @ 08:42), Max: 35.7 (10-04-24 @ 08:42)  T(F): 96.2 (10-04-24 @ 08:42), Max: 96.2 (10-04-24 @ 08:42)  HR: --  BP: --  BP(mean): --  RR: --  SpO2: --    Orthostatic VS  10-04-24 @ 08:42  Lying BP: --/-- HR: --  Sitting BP: 126/79 HR: 67  Standing BP: 118/80 HR: 109  Site: --  Mode: --  Orthostatic VS  10-03-24 @ 06:52  Lying BP: --/-- HR: --  Sitting BP: 122/77 HR: 75  Standing BP: 115/84 HR: 89  Site: upper right arm  Mode: electronic    Lipid Panel: Date/Time: 09-29-24 @ 08:44  Cholesterol, Serum: 220  LDL Cholesterol Calculated: 135  HDL Cholesterol, Serum: 36  Total Cholesterol/HDL Ration Measurement: --  Triglycerides, Serum: 247

## 2024-10-11 NOTE — BH INPATIENT PSYCHIATRY DISCHARGE NOTE - MSE UNSTRUCTURED FT
On exam today the patient is anxious but generally cooperative with fair eye contact.    Speech is clear and of normal rate.    Thought process: with no evidence of a disorder of thought process.    Thought content: with paranoid delusions of being monitored and controlled but able to be redirected easily  Perception: Denies hallucinations.    Mood: Describes as "better".  Affect: tired, constricted.    Patient denies suicidal ideation, active intent and plan.    Patient today denies active aggressive ideation toward the "people who did this" and denies homicidal ideation, intent or plan.   Patient is Alert and oriented in all spheres. Patient is cognitively grossly intact.   Insight and judgment are limited.   Impulse control is intact at this time.    Vital signs are stable.   Gait and station WNL

## 2024-10-11 NOTE — BH INPATIENT PSYCHIATRY PROGRESS NOTE - NSTXMEDICDATEEST_PSY_ALL_CORE
29-Sep-2024
02-Oct-2024
29-Sep-2024

## 2024-10-11 NOTE — BH INPATIENT PSYCHIATRY PROGRESS NOTE - NSTXPSYCHOGOAL_PSY_ALL_CORE
Will verbalize decreased distress related to psychosis to 2 on a 10-point scale
Other...
Will verbalize decreased distress related to psychosis to 2 on a 10-point scale
Other...
Other...
Will verbalize decreased distress related to psychosis to 2 on a 10-point scale
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
## 2024-10-11 NOTE — BH INPATIENT PSYCHIATRY PROGRESS NOTE - NSTXVIOLNTPROGRES_PSY_ALL_CORE
No Change
Improving
No Change
Improving
No Change
Improving
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
## 2024-10-11 NOTE — BH INPATIENT PSYCHIATRY PROGRESS NOTE - NSTXMEDICPROGRES_PSY_ALL_CORE
No Change
No Change
Improving
Improving
No Change
Improving

## 2024-10-11 NOTE — BH INPATIENT PSYCHIATRY DISCHARGE NOTE - NSBHSUBSTUSED_PSY_A_CORE
Cannabis W Plasty Text: The lesion was extirpated to the level of the fat with a #15 scalpel blade.  Given the location of the defect, shape of the defect and the proximity to free margins a W-plasty was deemed most appropriate for repair.  Using a sterile surgical marker, the appropriate transposition arms of the W-plasty were drawn incorporating the defect and placing the expected incisions within the relaxed skin tension lines where possible.    The area thus outlined was incised deep to adipose tissue with a #15 scalpel blade.  The skin margins were undermined to an appropriate distance in all directions utilizing iris scissors.  The opposing transposition arms were then transposed into place in opposite direction and anchored with interrupted buried subcutaneous sutures.

## 2024-10-11 NOTE — BH INPATIENT PSYCHIATRY PROGRESS NOTE - PRN MEDS
MEDICATIONS  (PRN):  acetaminophen     Tablet .. 650 milliGRAM(s) Oral every 6 hours PRN Mild Pain (1 - 3), Moderate Pain (4 - 6)  diphenhydrAMINE 50 milliGRAM(s) Oral every 4 hours PRN agitation  diphenhydrAMINE Injectable 50 milliGRAM(s) IntraMuscular once PRN severe agitation  haloperidol     Tablet 5 milliGRAM(s) Oral every 4 hours PRN agitation  haloperidol    Injectable 5 milliGRAM(s) IntraMuscular once PRN severe agitation  nicotine  Polacrilex Gum 4 milliGRAM(s) Oral every 2 hours PRN Smoking Cessation  
MEDICATIONS  (PRN):  acetaminophen     Tablet .. 650 milliGRAM(s) Oral every 6 hours PRN Mild Pain (1 - 3), Moderate Pain (4 - 6)  diphenhydrAMINE 50 milliGRAM(s) Oral every 4 hours PRN agitation  diphenhydrAMINE Injectable 50 milliGRAM(s) IntraMuscular once PRN severe agitation  haloperidol     Tablet 5 milliGRAM(s) Oral every 4 hours PRN agitation  haloperidol    Injectable 5 milliGRAM(s) IntraMuscular once PRN severe agitation  nicotine  Polacrilex Gum 4 milliGRAM(s) Oral every 2 hours PRN Smoking Cessation  
MEDICATIONS  (PRN):  diphenhydrAMINE 50 milliGRAM(s) Oral every 4 hours PRN agitation  diphenhydrAMINE Injectable 50 milliGRAM(s) IntraMuscular once PRN severe agitation  haloperidol     Tablet 5 milliGRAM(s) Oral every 4 hours PRN agitation  haloperidol    Injectable 5 milliGRAM(s) IntraMuscular once PRN severe agitation  LORazepam     Tablet 2 milliGRAM(s) Oral every 4 hours PRN Agitation  LORazepam   Injectable 2 milliGRAM(s) IntraMuscular once PRN severe agitation  nicotine  Polacrilex Gum 4 milliGRAM(s) Oral every 2 hours PRN Smoking Cessation  
MEDICATIONS  (PRN):  diphenhydrAMINE 50 milliGRAM(s) Oral every 4 hours PRN agitation  diphenhydrAMINE Injectable 50 milliGRAM(s) IntraMuscular once PRN severe agitation  haloperidol     Tablet 5 milliGRAM(s) Oral every 4 hours PRN agitation  haloperidol    Injectable 5 milliGRAM(s) IntraMuscular once PRN severe agitation  LORazepam     Tablet 2 milliGRAM(s) Oral every 4 hours PRN Agitation  LORazepam   Injectable 2 milliGRAM(s) IntraMuscular once PRN severe agitation  nicotine  Polacrilex Gum 4 milliGRAM(s) Oral every 2 hours PRN Smoking Cessation  
MEDICATIONS  (PRN):  acetaminophen     Tablet .. 650 milliGRAM(s) Oral every 6 hours PRN Mild Pain (1 - 3), Moderate Pain (4 - 6)  diphenhydrAMINE 50 milliGRAM(s) Oral every 4 hours PRN agitation  diphenhydrAMINE Injectable 50 milliGRAM(s) IntraMuscular once PRN severe agitation  haloperidol     Tablet 5 milliGRAM(s) Oral every 4 hours PRN agitation  haloperidol    Injectable 5 milliGRAM(s) IntraMuscular once PRN severe agitation  nicotine  Polacrilex Gum 4 milliGRAM(s) Oral every 2 hours PRN Smoking Cessation  
MEDICATIONS  (PRN):  diphenhydrAMINE 50 milliGRAM(s) Oral every 4 hours PRN agitation  diphenhydrAMINE Injectable 50 milliGRAM(s) IntraMuscular once PRN severe agitation  haloperidol     Tablet 5 milliGRAM(s) Oral every 4 hours PRN agitation  haloperidol    Injectable 5 milliGRAM(s) IntraMuscular once PRN severe agitation  LORazepam     Tablet 2 milliGRAM(s) Oral every 4 hours PRN Agitation  LORazepam   Injectable 2 milliGRAM(s) IntraMuscular once PRN severe agitation  nicotine  Polacrilex Gum 4 milliGRAM(s) Oral every 2 hours PRN Smoking Cessation  
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
## 2024-10-11 NOTE — BH INPATIENT PSYCHIATRY DISCHARGE NOTE - VIOLENCE RISK FACTORS:
Violent ideation/threat/speech/Lack of insight into violence risk/need for treatment/Noncompliance with treatment

## 2024-10-11 NOTE — BH INPATIENT PSYCHIATRY PROGRESS NOTE - NSTXMEDICGOAL_PSY_ALL_CORE
Take all medications as prescribed
Be able to describe the benefit of medication/treatment
Be able to describe the benefit of medication/treatment
Take all medications as prescribed
Be able to describe the benefit of medication/treatment
Take all medications as prescribed
Be able to describe the benefit of medication/treatment
Be able to describe the benefit of medication/treatment

## 2024-10-11 NOTE — BH INPATIENT PSYCHIATRY DISCHARGE NOTE - HOSPITAL COURSE
Patient admitted to [address] from 9/28/24-10/14/2024            On exam today the patient is generally cooperative and makes fair eye contact.   Speech is clear and of normal rate.  Thought process: with no disorder of thought process.   Thought content: with no evidence of delusional beliefs.   Perception: Denies hallucinations.  Mood: Describes as "improved"   Affect: flat.  Patient denies suicidal and aggressive ideation, intent and plan.   AAO X3. Cognitively grossly intact.   Insight and judgment are improved.  Impulse control is intact at this time    Suicide and risk assessment performed prior to discharge. The patient has a low acute risk and low chronic risk of self-harm and aggression towards others. Protective factors include denying SI, no SIB, denying HI, good social supports in their family, no substance abuse, no current mood symptoms, no hopelessness, future-oriented in returning to home, no access to firearms.  Risk factors include presenting illness. Immediate risk was minimized by inpatient admission to a safe environment with appropriate supervision and limited access to lethal means. Future risk was minimized before discharge by treatment of acute episode, maximizing outpatient support, providing relevant patient education, discussing emergency procedures, and ensuring close follow-up. The patient remains at a low risk of self-harm, and such risk cannot be further ameliorated by continued inpatient treatment and the patient is therefore appropriate for discharge.       There were no behavioral problems on the unit.  Patient did not become agitated and did not require emergent intramuscular medications or seclusion / restraints.  Patient did not self-harm on the unit.  Patient remained actively engaged in treatment.  Patient participated in individual, group, and milieu therapy.  Patient got along appropriately with staff and peers. Patient did not have any medical problems during this hospitalization.  There were no medical consultations.    A full discussion of the factors that predict treatment success and relapse was held including safety planning.  A discussion of the risks and benefits of patient’s medication was held including a discussion of the metabolic risks and risk of EPS and TD was done   On day of discharge, the patient no longer requires inpatient treatment and care. Patient denies all suicidal and aggressive ideation, intent and plan. Patient denies anxiety symptoms and panic attacks. Patient is not judged to be an acute danger to self or others at this time. Patient will be discharged to home and outpatient follow up.   Patient admitted to [address] from 9/28/24-10/14/2024    Patient admitted after exacerbation of psychosis in the context of nonadherence. Admitted to stopping his Lithium and Invega CAR and PO due to side effects of restlessness and belief he doesn't have an illness.  Asked not to be put back on risperidone. Started on Olanzapine trial which was well tolerated and patient's symptoms improved.  However he continued to have minimal insight into his symptoms or into having psychiatric illness  He refused a transition to CAR and parents felt strongly he had returned to his baseline.   As a result he was discharged on olanzapine 20 mg HS     On exam today the patient is generally cooperative and makes fair eye contact.   Speech is clear and of normal rate.  Thought process: with no disorder of thought process.   Thought content: with no evidence of delusional beliefs.   Perception: Denies hallucinations.  Mood: Describes as "improved"   Affect: flat.  Patient denies suicidal and aggressive ideation, intent and plan.   AAO X3. Cognitively grossly intact.   Insight and judgment are improved.  Impulse control is intact at this time    Suicide and risk assessment performed prior to discharge. The patient has a low acute risk and low chronic risk of self-harm and aggression towards others. Protective factors include denying SI, no SIB, denying HI, good social supports in their family, no substance abuse, no current mood symptoms, no hopelessness, future-oriented in returning to home, no access to firearms.  Risk factors include presenting illness. Immediate risk was minimized by inpatient admission to a safe environment with appropriate supervision and limited access to lethal means. Future risk was minimized before discharge by treatment of acute episode, maximizing outpatient support, providing relevant patient education, discussing emergency procedures, and ensuring close follow-up. The patient remains at a low risk of self-harm, and such risk cannot be further ameliorated by continued inpatient treatment and the patient is therefore appropriate for discharge.       There were no behavioral problems on the unit.  Patient did not become agitated and did not require emergent intramuscular medications or seclusion / restraints.  Patient did not self-harm on the unit.  Patient remained actively engaged in treatment.  Patient participated in individual, group, and milieu therapy.  Patient got along appropriately with staff and peers. Patient did not have any medical problems during this hospitalization.  There were no medical consultations.    A full discussion of the factors that predict treatment success and relapse was held including safety planning.  A discussion of the risks and benefits of patient’s medication was held including a discussion of the metabolic risks and risk of EPS and TD was done   On day of discharge, the patient no longer requires inpatient treatment and care. Patient denies all suicidal and aggressive ideation, intent and plan. Patient denies anxiety symptoms and panic attacks. Patient is not judged to be an acute danger to self or others at this time. Patient will be discharged to home and outpatient follow up.   Patient admitted to [address] from 9/28/24-10/14/2024    Patient admitted after exacerbation of psychosis in the context of nonadherence. Admitted to stopping his Lithium and Invega CAR and PO due to side effects of restlessness and belief he doesn't have an illness.  Asked not to be put back on risperidone. Started on Olanzapine trial which was well tolerated and patient's symptoms improved.  However he continued to have minimal insight into his symptoms or into having psychiatric illness  He refused a transition to CAR and parents felt strongly he had returned to his baseline.   As a result he was discharged on olanzapine 20 mg HS     On exam today the patient is generally cooperative and makes fair eye contact.   Speech is clear and of normal rate.  Thought process: with no disorder of thought process.   Thought content: with no evidence of delusional beliefs.   Perception: Denies hallucinations.  Mood: Describes as "improved"   Affect: flat.  Patient denies suicidal and aggressive ideation, intent and plan.   AAO X3. Cognitively grossly intact.   Insight and judgment are improved.  Impulse control is intact at this time    Suicide and risk assessment performed prior to discharge. The patient has a low acute risk and low chronic risk of self-harm and aggression towards others. Protective factors include denying SI, no SIB, denying HI, good social supports in their family, no substance abuse, no current mood symptoms, no hopelessness, future-oriented in returning to home, no access to firearms.  Risk factors include presenting illness. Immediate risk was minimized by inpatient admission to a safe environment with appropriate supervision and limited access to lethal means. Future risk was minimized before discharge by treatment of acute episode, maximizing outpatient support, providing relevant patient education, discussing emergency procedures, and ensuring close follow-up. The patient remains at a low risk of self-harm, and such risk cannot be further ameliorated by continued inpatient treatment and the patient is therefore appropriate for discharge.       There were no behavioral problems on the unit.  Patient did not become agitated and did not require emergent intramuscular medications or seclusion / restraints.  Patient did not self-harm on the unit.  Patient remained actively engaged in treatment.  Patient participated in individual, group, and milieu therapy.  Patient got along appropriately with staff and peers. Patient did not have any medical problems during this hospitalization.  There were no medical consultations.    A full discussion of the factors that predict treatment success and relapse was held including safety planning.  A discussion of the risks and benefits of patient’s medication was held including a discussion of the metabolic risks and risk of EPS and TD was done   On day of discharge, the patient no longer requires inpatient treatment and care. Patient denies all suicidal and aggressive ideation, intent and plan. Patient denies anxiety symptoms and panic attacks. Patient is not judged to be an acute danger to self or others at this time. Patient will be discharged to home and outpatient follow up at [facility] and then [facility]

## 2024-10-11 NOTE — BH INPATIENT PSYCHIATRY PROGRESS NOTE - NSCGISEVERILLNESS_PSY_ALL_CORE
6 = Severely ill - disruptive pathology, behavior and function are frequently influenced by symptoms, may require assistance from others

## 2024-10-11 NOTE — BH INPATIENT PSYCHIATRY DISCHARGE NOTE - NSBHFUPINTERVALHXFT_PSY_A_CORE
Patient seen for follow up of Psychosis and discharge day management  Greater than 30 minutes spent with patient and discharge charting  Chart reviewed and case discussed with staff  Patient continues to improve and denies acute symptoms but still with minimal insight into his symptoms and illness  Agrees to continue with meds post discharge

## 2024-10-11 NOTE — BH INPATIENT PSYCHIATRY PROGRESS NOTE - NSDCCRITERIA_PSY_ALL_CORE
Patient will be less psychotic and stable for discharge with a CGI Improvement Score of 2 

## 2024-10-11 NOTE — BH INPATIENT PSYCHIATRY PROGRESS NOTE - NSBHFUPINTERVALCCFT_PSY_A_CORE
"I think I am doing better"
"Lets just go up on the dose"
"I finally slept last night"
"I really want to be discharged as soon as I can and I will try and take the meds this time"
"I would like to go home today instead of next week!"
"Can we talk later?"
"I feel tired, sometimes I get very angry"
"I just feel like I need to catch up on my sleep "
"I am being truthful when I say I will take medications this time"
"I think I am doing better and wanted to know if I can  shave"
"I think I am ready to be discharged I feel better"

## 2024-10-11 NOTE — BH INPATIENT PSYCHIATRY PROGRESS NOTE - NSTXPROBVIOLNT_PSY_ALL_CORE
VIOLENT/AGGRESSIVE BEHAVIOR

## 2024-10-11 NOTE — BH INPATIENT PSYCHIATRY PROGRESS NOTE - MSE UNSTRUCTURED FT
On exam today the patient is anxious but generally cooperative with fair eye contact.    Speech is clear and of normal rate.    Thought process: with no evidence of a disorder of thought process.    Thought content: with paranoid delusions of being monitored and controlled   Perception: Denies hallucinations.    Mood: Describes as "a little better".  Affect: tired, constricted.    Patient denies suicidal ideation, active intent and plan.    Patient today denies active aggressive ideation toward the "people doing this" and denies homicidal ideation, intent or plan.   Patient is Alert and oriented in all spheres. Patient is cognitively grossly intact.   Insight and judgment are limited.   Impulse control is intact at this time.    Vital signs are stable.   Gait and station WNL  
On exam today the patient is anxious but generally cooperative with fair eye contact.    Speech is clear and of normal rate.    Thought process: with no evidence of a disorder of thought process.    Thought content: with paranoid delusions of being monitored and controlled but able to be redirected easily  Perception: Denies hallucinations.    Mood: Describes as "a little better".  Affect: tired, constricted.    Patient denies suicidal ideation, active intent and plan.    Patient today denies active aggressive ideation toward the "people who did this" and denies homicidal ideation, intent or plan.   Patient is Alert and oriented in all spheres. Patient is cognitively grossly intact.   Insight and judgment are limited.   Impulse control is intact at this time.    Vital signs are stable.   Gait and station WNL  
On exam today the patient is anxious but generally cooperative with fair eye contact.    Speech is clear and of normal rate.    Thought process: with no evidence of a disorder of thought process.    Thought content: with paranoid delusions of being monitored and controlled but able to be redirected easily  Perception: Denies hallucinations.    Mood: Describes as "a little better".  Affect: tired, constricted.    Patient denies suicidal ideation, active intent and plan.    Patient today denies active aggressive ideation toward the "people who did this" and denies homicidal ideation, intent or plan.   Patient is Alert and oriented in all spheres. Patient is cognitively grossly intact.   Insight and judgment are limited.   Impulse control is intact at this time.    Vital signs are stable.   Gait and station WNL  
On exam today the patient is anxious but generally cooperative with fair eye contact.    Speech is clear and of normal rate.    Thought process: with no evidence of a disorder of thought process.    Thought content: with paranoid delusions of being monitored and controlled   Perception: Denies hallucinations.    Mood: Describes as "better today and less tired".  Affect: constricted.    Patient denies suicidal ideation, active intent and plan.    Patient today denies active aggressive ideation toward the "people doing this" and denies homicidal ideation, intent or plan.   Patient is Alert and oriented in all spheres. Patient is cognitively grossly intact.   Insight and judgment are limited.   Impulse control is intact at this time.    Vital signs are stable.   Gait and station WNL  
On exam today the patient is anxious but generally cooperative with fair eye contact.    Speech is clear and of normal rate.    Thought process: with no evidence of a disorder of thought process.    Thought content: with paranoid delusions of being monitored and controlled   Perception: Denies hallucinations.    Mood: Describes as "a little better".  Affect: tired, constricted.    Patient denies suicidal ideation, active intent and plan.    Patient today denies active aggressive ideation toward the "people who did this" and denies homicidal ideation, intent or plan.   Patient is Alert and oriented in all spheres. Patient is cognitively grossly intact.   Insight and judgment are limited.   Impulse control is intact at this time.    Vital signs are stable.   Gait and station WNL  
On exam today the patient is anxious but generally cooperative with fair eye contact.    Speech is clear and of normal rate.    Thought process: with no evidence of a disorder of thought process.    Thought content: with paranoid delusions of being monitored and controlled   Perception: Denies hallucinations.    Mood: Describes as "pretty tired".  Affect: tired, constricted.    Patient denies suicidal ideation, active intent and plan.    Patient today denies active aggressive ideation toward the "people doing this" and denies homicidal ideation, intent or plan.   Patient is Alert and oriented in all spheres. Patient is cognitively grossly intact.   Insight and judgment are limited.   Impulse control is intact at this time.    Vital signs are stable.   Gait and station WNL  
On exam today the patient is anxious but generally cooperative with fair eye contact.    Speech is clear and of normal rate.    Thought process: with no evidence of a disorder of thought process.    Thought content: with paranoid delusions of being monitored and controlled   Perception: Denies hallucinations.    Mood: Describes as "tired".  Affect: constricted.    Patient denies suicidal ideation, active intent and plan.    Patient with active aggressive ideation toward the "people doing this" but denies homicidal ideation, intent or plan.   Patient is Alert and oriented in all spheres. Patient is cognitively grossly intact.   Insight and judgment are limited.   Impulse control is intact at this time.    Vital signs are stable.   Gait and station WNL  
On exam today the patient is anxious but generally cooperative with fair eye contact.    Speech is clear and of normal rate.    Thought process: with no evidence of a disorder of thought process.    Thought content: with paranoid delusions of being monitored and controlled   Perception: Denies hallucinations.    Mood: Describes as "a little better".  Affect: tired, constricted.    Patient denies suicidal ideation, active intent and plan.    Patient today denies active aggressive ideation toward the "people doing this" and denies homicidal ideation, intent or plan.   Patient is Alert and oriented in all spheres. Patient is cognitively grossly intact.   Insight and judgment are limited.   Impulse control is intact at this time.    Vital signs are stable.   Gait and station WNL  
On exam today the patient is anxious but generally cooperative with fair eye contact.    Speech is clear and of normal rate.    Thought process: with no evidence of a disorder of thought process.    Thought content: with paranoid delusions of being monitored and controlled   Perception: Denies hallucinations.    Mood: Describes as "tired".  Affect: constricted.    Patient denies suicidal ideation, active intent and plan.    Patient with active aggressive ideation toward the "people doing this" but denies homicidal ideation, intent or plan.   Patient is Alert and oriented in all spheres. Patient is cognitively grossly intact.   Insight and judgment are limited.   Impulse control is intact at this time.    Vital signs are stable.   Gait and station WNL  
On exam today the patient is anxious but generally cooperative with fair eye contact.    Speech is clear and of normal rate.    Thought process: with no evidence of a disorder of thought process.    Thought content: with paranoid delusions of being monitored and controlled   Perception: Denies hallucinations.    Mood: Describes as "tired".  Affect: constricted.    Patient denies suicidal ideation, active intent and plan.    Patient with active aggressive ideation toward the "people doing this" but denies homicidal ideation, intent or plan.   Patient is Alert and oriented in all spheres. Patient is cognitively grossly intact.   Insight and judgment are limited.   Impulse control is intact at this time.    Vital signs are stable.   Gait and station WNL  
On exam today the patient is anxious but generally cooperative with fair eye contact.    Speech is clear and of normal rate.    Thought process: with no evidence of a disorder of thought process.    Thought content: with paranoid delusions of being monitored and controlled but able to be redirected easily  Perception: Denies hallucinations.    Mood: Describes as "a little better".  Affect: tired, constricted.    Patient denies suicidal ideation, active intent and plan.    Patient today denies active aggressive ideation toward the "people who did this" and denies homicidal ideation, intent or plan.   Patient is Alert and oriented in all spheres. Patient is cognitively grossly intact.   Insight and judgment are limited.   Impulse control is intact at this time.    Vital signs are stable.   Gait and station WNL

## 2024-10-11 NOTE — BH INPATIENT PSYCHIATRY PROGRESS NOTE - NSTXVIOLNTGOAL_PSY_ALL_CORE
Will communicate an angry feeling in a controlled manner
Will communicate an angry feeling in a controlled manner
Will decrease the number/duration/intensity of angry/aggressive outbursts
Will communicate an angry feeling in a controlled manner
Will decrease the number/duration/intensity of angry/aggressive outbursts
Will decrease the number/duration/intensity of angry/aggressive outbursts

## 2024-10-11 NOTE — BH INPATIENT PSYCHIATRY PROGRESS NOTE - NSTXDCOPNODATEEST_PSY_ALL_CORE
30-Sep-2024

## 2024-10-11 NOTE — BH INPATIENT PSYCHIATRY PROGRESS NOTE - NSTXPSYCHODATEEST_PSY_ALL_CORE
02-Oct-2024
28-Sep-2024
28-Sep-2024
02-Oct-2024
28-Sep-2024
02-Oct-2024

## 2024-10-11 NOTE — BH INPATIENT PSYCHIATRY PROGRESS NOTE - NSCGIIMPROVESX_PSY_ALL_CORE

## 2024-10-11 NOTE — BH INPATIENT PSYCHIATRY PROGRESS NOTE - CURRENT MEDICATION
MEDICATIONS  (STANDING):  OLANZapine 10 milliGRAM(s) Oral at bedtime    MEDICATIONS  (PRN):  diphenhydrAMINE 50 milliGRAM(s) Oral every 4 hours PRN agitation  diphenhydrAMINE Injectable 50 milliGRAM(s) IntraMuscular once PRN severe agitation  haloperidol     Tablet 5 milliGRAM(s) Oral every 4 hours PRN agitation  haloperidol    Injectable 5 milliGRAM(s) IntraMuscular once PRN severe agitation  LORazepam     Tablet 2 milliGRAM(s) Oral every 4 hours PRN Agitation  LORazepam   Injectable 2 milliGRAM(s) IntraMuscular once PRN severe agitation  nicotine  Polacrilex Gum 4 milliGRAM(s) Oral every 2 hours PRN Smoking Cessation  
MEDICATIONS  (STANDING):  OLANZapine 20 milliGRAM(s) Oral at bedtime    MEDICATIONS  (PRN):  acetaminophen     Tablet .. 650 milliGRAM(s) Oral every 6 hours PRN Mild Pain (1 - 3), Moderate Pain (4 - 6)  diphenhydrAMINE 50 milliGRAM(s) Oral every 4 hours PRN agitation  diphenhydrAMINE Injectable 50 milliGRAM(s) IntraMuscular once PRN severe agitation  haloperidol     Tablet 5 milliGRAM(s) Oral every 4 hours PRN agitation  haloperidol    Injectable 5 milliGRAM(s) IntraMuscular once PRN severe agitation  nicotine  Polacrilex Gum 4 milliGRAM(s) Oral every 2 hours PRN Smoking Cessation  
MEDICATIONS  (STANDING):  OLANZapine 10 milliGRAM(s) Oral at bedtime    MEDICATIONS  (PRN):  diphenhydrAMINE 50 milliGRAM(s) Oral every 4 hours PRN agitation  diphenhydrAMINE Injectable 50 milliGRAM(s) IntraMuscular once PRN severe agitation  haloperidol     Tablet 5 milliGRAM(s) Oral every 4 hours PRN agitation  haloperidol    Injectable 5 milliGRAM(s) IntraMuscular once PRN severe agitation  LORazepam     Tablet 2 milliGRAM(s) Oral every 4 hours PRN Agitation  LORazepam   Injectable 2 milliGRAM(s) IntraMuscular once PRN severe agitation  nicotine  Polacrilex Gum 4 milliGRAM(s) Oral every 2 hours PRN Smoking Cessation  
MEDICATIONS  (STANDING):  OLANZapine 20 milliGRAM(s) Oral at bedtime    MEDICATIONS  (PRN):  acetaminophen     Tablet .. 650 milliGRAM(s) Oral every 6 hours PRN Mild Pain (1 - 3), Moderate Pain (4 - 6)  diphenhydrAMINE 50 milliGRAM(s) Oral every 4 hours PRN agitation  diphenhydrAMINE Injectable 50 milliGRAM(s) IntraMuscular once PRN severe agitation  haloperidol     Tablet 5 milliGRAM(s) Oral every 4 hours PRN agitation  haloperidol    Injectable 5 milliGRAM(s) IntraMuscular once PRN severe agitation  nicotine  Polacrilex Gum 4 milliGRAM(s) Oral every 2 hours PRN Smoking Cessation  
MEDICATIONS  (STANDING):  OLANZapine 20 milliGRAM(s) Oral at bedtime    MEDICATIONS  (PRN):  acetaminophen     Tablet .. 650 milliGRAM(s) Oral every 6 hours PRN Mild Pain (1 - 3), Moderate Pain (4 - 6)  diphenhydrAMINE 50 milliGRAM(s) Oral every 4 hours PRN agitation  diphenhydrAMINE Injectable 50 milliGRAM(s) IntraMuscular once PRN severe agitation  haloperidol     Tablet 5 milliGRAM(s) Oral every 4 hours PRN agitation  haloperidol    Injectable 5 milliGRAM(s) IntraMuscular once PRN severe agitation  nicotine  Polacrilex Gum 4 milliGRAM(s) Oral every 2 hours PRN Smoking Cessation  
MEDICATIONS  (STANDING):  OLANZapine 10 milliGRAM(s) Oral at bedtime    MEDICATIONS  (PRN):  diphenhydrAMINE 50 milliGRAM(s) Oral every 4 hours PRN agitation  diphenhydrAMINE Injectable 50 milliGRAM(s) IntraMuscular once PRN severe agitation  haloperidol     Tablet 5 milliGRAM(s) Oral every 4 hours PRN agitation  haloperidol    Injectable 5 milliGRAM(s) IntraMuscular once PRN severe agitation  LORazepam     Tablet 2 milliGRAM(s) Oral every 4 hours PRN Agitation  LORazepam   Injectable 2 milliGRAM(s) IntraMuscular once PRN severe agitation  nicotine  Polacrilex Gum 4 milliGRAM(s) Oral every 2 hours PRN Smoking Cessation  
MEDICATIONS  (STANDING):  OLANZapine 15 milliGRAM(s) Oral at bedtime    MEDICATIONS  (PRN):  diphenhydrAMINE 50 milliGRAM(s) Oral every 4 hours PRN agitation  diphenhydrAMINE Injectable 50 milliGRAM(s) IntraMuscular once PRN severe agitation  haloperidol     Tablet 5 milliGRAM(s) Oral every 4 hours PRN agitation  haloperidol    Injectable 5 milliGRAM(s) IntraMuscular once PRN severe agitation  LORazepam     Tablet 2 milliGRAM(s) Oral every 4 hours PRN Agitation  LORazepam   Injectable 2 milliGRAM(s) IntraMuscular once PRN severe agitation  nicotine  Polacrilex Gum 4 milliGRAM(s) Oral every 2 hours PRN Smoking Cessation  
MEDICATIONS  (STANDING):  OLANZapine 20 milliGRAM(s) Oral at bedtime    MEDICATIONS  (PRN):  acetaminophen     Tablet .. 650 milliGRAM(s) Oral every 6 hours PRN Mild Pain (1 - 3), Moderate Pain (4 - 6)  diphenhydrAMINE 50 milliGRAM(s) Oral every 4 hours PRN agitation  diphenhydrAMINE Injectable 50 milliGRAM(s) IntraMuscular once PRN severe agitation  haloperidol     Tablet 5 milliGRAM(s) Oral every 4 hours PRN agitation  haloperidol    Injectable 5 milliGRAM(s) IntraMuscular once PRN severe agitation  nicotine  Polacrilex Gum 4 milliGRAM(s) Oral every 2 hours PRN Smoking Cessation  
MEDICATIONS  (STANDING):  OLANZapine 10 milliGRAM(s) Oral at bedtime    MEDICATIONS  (PRN):  diphenhydrAMINE 50 milliGRAM(s) Oral every 4 hours PRN agitation  diphenhydrAMINE Injectable 50 milliGRAM(s) IntraMuscular once PRN severe agitation  haloperidol     Tablet 5 milliGRAM(s) Oral every 4 hours PRN agitation  haloperidol    Injectable 5 milliGRAM(s) IntraMuscular once PRN severe agitation  LORazepam     Tablet 2 milliGRAM(s) Oral every 4 hours PRN Agitation  LORazepam   Injectable 2 milliGRAM(s) IntraMuscular once PRN severe agitation  nicotine  Polacrilex Gum 4 milliGRAM(s) Oral every 2 hours PRN Smoking Cessation  
MEDICATIONS  (STANDING):  OLANZapine 10 milliGRAM(s) Oral at bedtime    MEDICATIONS  (PRN):  diphenhydrAMINE 50 milliGRAM(s) Oral every 4 hours PRN agitation  diphenhydrAMINE Injectable 50 milliGRAM(s) IntraMuscular once PRN severe agitation  haloperidol     Tablet 5 milliGRAM(s) Oral every 4 hours PRN agitation  haloperidol    Injectable 5 milliGRAM(s) IntraMuscular once PRN severe agitation  LORazepam     Tablet 2 milliGRAM(s) Oral every 4 hours PRN Agitation  LORazepam   Injectable 2 milliGRAM(s) IntraMuscular once PRN severe agitation  nicotine  Polacrilex Gum 4 milliGRAM(s) Oral every 2 hours PRN Smoking Cessation  
MEDICATIONS  (STANDING):  OLANZapine 20 milliGRAM(s) Oral at bedtime    MEDICATIONS  (PRN):  acetaminophen     Tablet .. 650 milliGRAM(s) Oral every 6 hours PRN Mild Pain (1 - 3), Moderate Pain (4 - 6)  diphenhydrAMINE 50 milliGRAM(s) Oral every 4 hours PRN agitation  diphenhydrAMINE Injectable 50 milliGRAM(s) IntraMuscular once PRN severe agitation  haloperidol     Tablet 5 milliGRAM(s) Oral every 4 hours PRN agitation  haloperidol    Injectable 5 milliGRAM(s) IntraMuscular once PRN severe agitation  nicotine  Polacrilex Gum 4 milliGRAM(s) Oral every 2 hours PRN Smoking Cessation

## 2024-10-11 NOTE — BH INPATIENT PSYCHIATRY PROGRESS NOTE - NSBHTIMEACTIVITIESPERFORMED_PSY_A_CORE
Time spent with patient included patient interview, reviewing chart and charting and reviewing case with treatment team 
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
## 2024-10-11 NOTE — BH INPATIENT PSYCHIATRY PROGRESS NOTE - NSTXPSYCHODATETRGT_PSY_ALL_CORE
16-Oct-2024
16-Oct-2024
09-Oct-2024
11-Oct-2024
09-Oct-2024
16-Oct-2024
11-Oct-2024
09-Oct-2024
09-Oct-2024
11-Oct-2024
09-Oct-2024

## 2024-10-11 NOTE — BH INPATIENT PSYCHIATRY PROGRESS NOTE - NSBHCHARTREVIEWVS_PSY_A_CORE FT
Vital Signs Last 24 Hrs  T(C): 36.3 (10-03-24 @ 06:52), Max: 36.3 (10-03-24 @ 06:52)  T(F): 97.4 (10-03-24 @ 06:52), Max: 97.4 (10-03-24 @ 06:52)  HR: --  BP: --  BP(mean): --  RR: --  SpO2: --    Orthostatic VS  10-03-24 @ 06:52  Lying BP: --/-- HR: --  Sitting BP: 122/77 HR: 75  Standing BP: 115/84 HR: 89  Site: upper right arm  Mode: electronic  Orthostatic VS  10-02-24 @ 08:07  Lying BP: --/-- HR: --  Sitting BP: 128/79 HR: 86  Standing BP: 134/86 HR: 97  Site: --  Mode: --  
Vital Signs Last 24 Hrs  T(C): 36.6 (09-29-24 @ 08:32), Max: 36.6 (09-29-24 @ 08:32)  T(F): 97.8 (09-29-24 @ 08:32), Max: 97.8 (09-29-24 @ 08:32)  HR: --  BP: --  BP(mean): --  RR: --  SpO2: --    Orthostatic VS  09-29-24 @ 08:32  Lying BP: --/-- HR: --  Sitting BP: 112/78 HR: 69  Standing BP: 125/83 HR: 98  Site: --  Mode: --  Orthostatic VS  09-28-24 @ 10:55  Lying BP: 124/84 HR: 71  Sitting BP: 125/94 HR: --  Standing BP: --/90 HR: --  Site: --  Mode: --  
Vital Signs Last 24 Hrs  T(C): 36.4 (10-11-24 @ 06:31), Max: 36.4 (10-11-24 @ 06:31)  T(F): 97.5 (10-11-24 @ 06:31), Max: 97.5 (10-11-24 @ 06:31)  HR: --  BP: --  BP(mean): --  RR: --  SpO2: --    Orthostatic VS  10-11-24 @ 06:31  Lying BP: --/-- HR: --  Sitting BP: 112/71 HR: 99  Standing BP: 114/72 HR: 109  Site: --  Mode: --  Orthostatic VS  10-10-24 @ 08:17  Lying BP: --/-- HR: --  Sitting BP: 124/87 HR: 86  Standing BP: 136/88 HR: 94  Site: --  Mode: --  Orthostatic VS  10-09-24 @ 08:29  Lying BP: --/-- HR: --  Sitting BP: 133/80 HR: 81  Standing BP: 131/81 HR: 90  Site: --  Mode: --  
Vital Signs Last 24 Hrs  T(C): 36.8 (10-02-24 @ 08:07), Max: 36.8 (10-02-24 @ 08:07)  T(F): 98.3 (10-02-24 @ 08:07), Max: 98.3 (10-02-24 @ 08:07)  HR: --  BP: --  BP(mean): --  RR: --  SpO2: --    Orthostatic VS  10-02-24 @ 08:07  Lying BP: --/-- HR: --  Sitting BP: 128/79 HR: 86  Standing BP: 134/86 HR: 97  Site: --  Mode: --  
Vital Signs Last 24 Hrs  T(C): 36.2 (10-10-24 @ 08:17), Max: 36.2 (10-10-24 @ 08:17)  T(F): 97.2 (10-10-24 @ 08:17), Max: 97.2 (10-10-24 @ 08:17)  HR: --  BP: --  BP(mean): --  RR: --  SpO2: --    Orthostatic VS  10-10-24 @ 08:17  Lying BP: --/-- HR: --  Sitting BP: 124/87 HR: 86  Standing BP: 136/88 HR: 94  Site: --  Mode: --  Orthostatic VS  10-09-24 @ 08:29  Lying BP: --/-- HR: --  Sitting BP: 133/80 HR: 81  Standing BP: 131/81 HR: 90  Site: --  Mode: --  
Vital Signs Last 24 Hrs  T(C): 36.1 (10-09-24 @ 08:29), Max: 36.1 (10-09-24 @ 08:29)  T(F): 96.9 (10-09-24 @ 08:29), Max: 96.9 (10-09-24 @ 08:29)  HR: --  BP: --  BP(mean): --  RR: --  SpO2: --    Orthostatic VS  10-09-24 @ 08:29  Lying BP: --/-- HR: --  Sitting BP: 133/80 HR: 81  Standing BP: 131/81 HR: 90  Site: --  Mode: --  Orthostatic VS  10-08-24 @ 08:18  Lying BP: --/-- HR: --  Sitting BP: 132/96 HR: 86  Standing BP: 122/82 HR: 88  Site: --  Mode: --  
Vital Signs Last 24 Hrs  T(C): --  T(F): --  HR: --  BP: --  BP(mean): --  RR: --  SpO2: --    Orthostatic VS  09-30-24 @ 07:40  Lying BP: --/-- HR: --  Sitting BP: 121/78 HR: 74  Standing BP: 114/71 HR: 99  Site: --  Mode: --  
Vital Signs Last 24 Hrs  T(C): 36.6 (09-30-24 @ 07:40), Max: 36.6 (09-30-24 @ 07:40)  T(F): 97.9 (09-30-24 @ 07:40), Max: 97.9 (09-30-24 @ 07:40)  HR: --  BP: --  BP(mean): --  RR: --  SpO2: --    Orthostatic VS  09-30-24 @ 07:40  Lying BP: --/-- HR: --  Sitting BP: 121/78 HR: 74  Standing BP: 114/71 HR: 99  Site: --  Mode: --  Orthostatic VS  09-29-24 @ 08:32  Lying BP: --/-- HR: --  Sitting BP: 112/78 HR: 69  Standing BP: 125/83 HR: 98  Site: --  Mode: --  
Vital Signs Last 24 Hrs  T(C): --  T(F): --  HR: --  BP: --  BP(mean): --  RR: --  SpO2: --    Orthostatic VS  10-06-24 @ 08:14  Lying BP: --/-- HR: --  Sitting BP: 111/68 HR: 77  Standing BP: 105/63 HR: 96  Site: --  Mode: --  
Vital Signs Last 24 Hrs  T(C): 35.7 (10-04-24 @ 08:42), Max: 35.7 (10-04-24 @ 08:42)  T(F): 96.2 (10-04-24 @ 08:42), Max: 96.2 (10-04-24 @ 08:42)  HR: --  BP: --  BP(mean): --  RR: --  SpO2: --    Orthostatic VS  10-04-24 @ 08:42  Lying BP: --/-- HR: --  Sitting BP: 126/79 HR: 67  Standing BP: 118/80 HR: 109  Site: --  Mode: --  Orthostatic VS  10-03-24 @ 06:52  Lying BP: --/-- HR: --  Sitting BP: 122/77 HR: 75  Standing BP: 115/84 HR: 89  Site: upper right arm  Mode: electronic  
Vital Signs Last 24 Hrs  T(C): 36 (10-08-24 @ 08:18), Max: 36 (10-08-24 @ 08:18)  T(F): 96.8 (10-08-24 @ 08:18), Max: 96.8 (10-08-24 @ 08:18)  HR: --  BP: --  BP(mean): --  RR: --  SpO2: --    Orthostatic VS  10-08-24 @ 08:18  Lying BP: --/-- HR: --  Sitting BP: 132/96 HR: 86  Standing BP: 122/82 HR: 88  Site: --  Mode: --

## 2024-10-11 NOTE — BH INPATIENT PSYCHIATRY PROGRESS NOTE - NSTXPSYCHOINTERMD_PSY_ALL_CORE
Meds and therapy

## 2024-10-11 NOTE — BH INPATIENT PSYCHIATRY PROGRESS NOTE - NSICDXBHTERTIARYDX_PSY_ALL_CORE
R/O Schizophrenia   F20.9  

## 2024-10-11 NOTE — BH INPATIENT PSYCHIATRY PROGRESS NOTE - NSTXVIOLNTDATEEST_PSY_ALL_CORE
02-Oct-2024
02-Oct-2024
28-Sep-2024
02-Oct-2024
02-Oct-2024
28-Sep-2024
02-Oct-2024
28-Sep-2024

## 2024-10-11 NOTE — BH INPATIENT PSYCHIATRY PROGRESS NOTE - NSTXMEDICDATETRGT_PSY_ALL_CORE
16-Oct-2024
13-Oct-2024
13-Oct-2024
06-Oct-2024
09-Oct-2024
16-Oct-2024
16-Oct-2024
09-Oct-2024
06-Oct-2024
09-Oct-2024
06-Oct-2024

## 2024-10-11 NOTE — BH INPATIENT PSYCHIATRY PROGRESS NOTE - NSTXVIOLNTDATETRGT_PSY_ALL_CORE
09-Oct-2024
16-Oct-2024
16-Oct-2024
09-Oct-2024
16-Oct-2024

## 2024-10-11 NOTE — BH INPATIENT PSYCHIATRY PROGRESS NOTE - NSBHATTESTBILLING_PSY_A_CORE
[id number]-Mrrbyaqjds OBS or IP - moderate complexity OR 35-49 mins
[id number]-Deqmsxfbvx OBS or IP - moderate complexity OR 35-49 mins
[id number]-Vqihhbyznp OBS or IP - moderate complexity OR 35-49 mins
[id number]-Rtoqilaset OBS or IP - moderate complexity OR 35-49 mins
[id number]-Hrzszthtuw OBS or IP - moderate complexity OR 35-49 mins
[id number]-Qfzouxcaeh OBS or IP - moderate complexity OR 35-49 mins
[id number]-Pyivzrxvxj OBS or IP - low complexity OR 25-34 mins
[id number]-Axmypykfro OBS or IP - moderate complexity OR 35-49 mins
[id number]-Crtjscclnl OBS or IP - moderate complexity OR 35-49 mins
[id number]-Ytqpukeftn OBS or IP - moderate complexity OR 35-49 mins
[id number]-Dnkduhavni OBS or IP - moderate complexity OR 35-49 mins

## 2024-10-12 RX ADMIN — Medication 4 MILLIGRAM(S): at 09:52

## 2024-10-12 RX ADMIN — Medication 4 MILLIGRAM(S): at 15:33

## 2024-10-12 RX ADMIN — Medication 20 MILLIGRAM(S): at 20:09

## 2024-10-12 RX ADMIN — Medication 4 MILLIGRAM(S): at 18:45

## 2024-10-13 RX ADMIN — Medication 20 MILLIGRAM(S): at 20:27

## 2024-10-13 RX ADMIN — Medication 4 MILLIGRAM(S): at 20:27

## 2024-10-14 VITALS — TEMPERATURE: 98 F

## 2024-10-14 RX ADMIN — Medication 4 MILLIGRAM(S): at 09:47

## 2024-10-16 ENCOUNTER — OUTPATIENT (OUTPATIENT)
Dept: OUTPATIENT SERVICES | Facility: HOSPITAL | Age: 24
LOS: 1 days | Discharge: ANOTHER HOSPITAL | End: 2024-10-16

## 2024-10-16 DIAGNOSIS — Z98.890 OTHER SPECIFIED POSTPROCEDURAL STATES: Chronic | ICD-10-CM

## 2024-10-17 DIAGNOSIS — F41.9 ANXIETY DISORDER, UNSPECIFIED: ICD-10-CM

## 2024-10-17 DIAGNOSIS — F31.73 BIPOLAR DISORDER, IN PARTIAL REMISSION, MOST RECENT EPISODE MANIC: ICD-10-CM
